# Patient Record
Sex: FEMALE | Race: OTHER | Employment: OTHER | URBAN - NONMETROPOLITAN AREA
[De-identification: names, ages, dates, MRNs, and addresses within clinical notes are randomized per-mention and may not be internally consistent; named-entity substitution may affect disease eponyms.]

---

## 2017-02-06 RX ORDER — AMLODIPINE BESYLATE 10 MG/1
TABLET ORAL
Qty: 90 TABLET | Refills: 0 | Status: SHIPPED | OUTPATIENT
Start: 2017-02-06 | End: 2017-07-03 | Stop reason: SDUPTHER

## 2017-02-06 RX ORDER — ISOSORBIDE MONONITRATE 30 MG/1
30 TABLET, EXTENDED RELEASE ORAL DAILY
Qty: 90 TABLET | Refills: 3 | Status: SHIPPED | OUTPATIENT
Start: 2017-02-06 | End: 2018-05-08 | Stop reason: SDUPTHER

## 2017-02-06 RX ORDER — BUPROPION HYDROCHLORIDE 150 MG/1
TABLET, EXTENDED RELEASE ORAL
Qty: 90 TABLET | Refills: 0 | Status: SHIPPED | OUTPATIENT
Start: 2017-02-06 | End: 2017-06-02 | Stop reason: SDUPTHER

## 2017-02-06 RX ORDER — METFORMIN HYDROCHLORIDE 500 MG/1
TABLET, EXTENDED RELEASE ORAL
Qty: 180 TABLET | Refills: 0 | Status: SHIPPED | OUTPATIENT
Start: 2017-02-06 | End: 2017-06-02 | Stop reason: SDUPTHER

## 2017-02-06 RX ORDER — GLIMEPIRIDE 4 MG/1
TABLET ORAL
Qty: 90 TABLET | Refills: 0 | Status: SHIPPED | OUTPATIENT
Start: 2017-02-06 | End: 2017-03-30 | Stop reason: SDUPTHER

## 2017-02-06 RX ORDER — CITALOPRAM 40 MG/1
TABLET ORAL
Qty: 90 TABLET | Refills: 0 | Status: SHIPPED | OUTPATIENT
Start: 2017-02-06 | End: 2017-06-02 | Stop reason: SDUPTHER

## 2017-03-22 ENCOUNTER — OFFICE VISIT (OUTPATIENT)
Dept: CARDIOLOGY | Age: 57
End: 2017-03-22
Payer: MEDICARE

## 2017-03-22 VITALS
DIASTOLIC BLOOD PRESSURE: 98 MMHG | SYSTOLIC BLOOD PRESSURE: 144 MMHG | HEART RATE: 83 BPM | BODY MASS INDEX: 34.4 KG/M2 | WEIGHT: 175.2 LBS | HEIGHT: 60 IN

## 2017-03-22 DIAGNOSIS — R07.9 CHEST PAIN, UNSPECIFIED: ICD-10-CM

## 2017-03-22 DIAGNOSIS — R00.2 PALPITATIONS: ICD-10-CM

## 2017-03-22 DIAGNOSIS — E78.2 MIXED HYPERLIPIDEMIA: ICD-10-CM

## 2017-03-22 DIAGNOSIS — I10 ESSENTIAL HYPERTENSION: ICD-10-CM

## 2017-03-22 DIAGNOSIS — I25.119 CORONARY ARTERY DISEASE INVOLVING NATIVE CORONARY ARTERY OF NATIVE HEART WITH ANGINA PECTORIS (HCC): Primary | ICD-10-CM

## 2017-03-22 PROCEDURE — 3017F COLORECTAL CA SCREEN DOC REV: CPT | Performed by: INTERNAL MEDICINE

## 2017-03-22 PROCEDURE — G8427 DOCREV CUR MEDS BY ELIG CLIN: HCPCS | Performed by: INTERNAL MEDICINE

## 2017-03-22 PROCEDURE — G8598 ASA/ANTIPLAT THER USED: HCPCS | Performed by: INTERNAL MEDICINE

## 2017-03-22 PROCEDURE — 93000 ELECTROCARDIOGRAM COMPLETE: CPT | Performed by: INTERNAL MEDICINE

## 2017-03-22 PROCEDURE — 3014F SCREEN MAMMO DOC REV: CPT | Performed by: INTERNAL MEDICINE

## 2017-03-22 PROCEDURE — G8417 CALC BMI ABV UP PARAM F/U: HCPCS | Performed by: INTERNAL MEDICINE

## 2017-03-22 PROCEDURE — 99214 OFFICE O/P EST MOD 30 MIN: CPT | Performed by: INTERNAL MEDICINE

## 2017-03-22 PROCEDURE — G8484 FLU IMMUNIZE NO ADMIN: HCPCS | Performed by: INTERNAL MEDICINE

## 2017-03-22 PROCEDURE — 1036F TOBACCO NON-USER: CPT | Performed by: INTERNAL MEDICINE

## 2017-03-30 ENCOUNTER — TELEPHONE (OUTPATIENT)
Dept: SURGERY | Age: 57
End: 2017-03-30

## 2017-03-30 ENCOUNTER — OFFICE VISIT (OUTPATIENT)
Dept: FAMILY MEDICINE CLINIC | Age: 57
End: 2017-03-30
Payer: MEDICARE

## 2017-03-30 VITALS
HEIGHT: 60 IN | HEART RATE: 88 BPM | BODY MASS INDEX: 34.16 KG/M2 | DIASTOLIC BLOOD PRESSURE: 84 MMHG | WEIGHT: 174 LBS | SYSTOLIC BLOOD PRESSURE: 160 MMHG

## 2017-03-30 DIAGNOSIS — R92.8 ABNORMAL MAMMOGRAM OF RIGHT BREAST: ICD-10-CM

## 2017-03-30 DIAGNOSIS — K21.9 GASTROESOPHAGEAL REFLUX DISEASE WITHOUT ESOPHAGITIS: ICD-10-CM

## 2017-03-30 DIAGNOSIS — I25.119 CORONARY ARTERY DISEASE INVOLVING NATIVE CORONARY ARTERY OF NATIVE HEART WITH ANGINA PECTORIS (HCC): ICD-10-CM

## 2017-03-30 DIAGNOSIS — R91.1 LUNG NODULE: ICD-10-CM

## 2017-03-30 DIAGNOSIS — G47.00 INSOMNIA, UNSPECIFIED TYPE: ICD-10-CM

## 2017-03-30 DIAGNOSIS — E83.42 HYPOMAGNESEMIA: ICD-10-CM

## 2017-03-30 DIAGNOSIS — E66.09 NON MORBID OBESITY DUE TO EXCESS CALORIES: ICD-10-CM

## 2017-03-30 DIAGNOSIS — E78.2 MIXED HYPERLIPIDEMIA: ICD-10-CM

## 2017-03-30 DIAGNOSIS — F33.42 RECURRENT MAJOR DEPRESSIVE DISORDER, IN FULL REMISSION (HCC): ICD-10-CM

## 2017-03-30 DIAGNOSIS — I10 ESSENTIAL HYPERTENSION: ICD-10-CM

## 2017-03-30 DIAGNOSIS — Z13.9 SCREENING: ICD-10-CM

## 2017-03-30 DIAGNOSIS — Z11.59 NEED FOR HEPATITIS C SCREENING TEST: Primary | ICD-10-CM

## 2017-03-30 DIAGNOSIS — E11.9 TYPE 2 DIABETES MELLITUS WITHOUT COMPLICATION, WITHOUT LONG-TERM CURRENT USE OF INSULIN (HCC): ICD-10-CM

## 2017-03-30 DIAGNOSIS — G47.33 OBSTRUCTIVE SLEEP APNEA: ICD-10-CM

## 2017-03-30 DIAGNOSIS — F41.9 ANXIETY: ICD-10-CM

## 2017-03-30 PROCEDURE — G8484 FLU IMMUNIZE NO ADMIN: HCPCS | Performed by: FAMILY MEDICINE

## 2017-03-30 PROCEDURE — G8417 CALC BMI ABV UP PARAM F/U: HCPCS | Performed by: FAMILY MEDICINE

## 2017-03-30 PROCEDURE — 93000 ELECTROCARDIOGRAM COMPLETE: CPT | Performed by: FAMILY MEDICINE

## 2017-03-30 PROCEDURE — 3046F HEMOGLOBIN A1C LEVEL >9.0%: CPT | Performed by: FAMILY MEDICINE

## 2017-03-30 PROCEDURE — 3017F COLORECTAL CA SCREEN DOC REV: CPT | Performed by: FAMILY MEDICINE

## 2017-03-30 PROCEDURE — G8427 DOCREV CUR MEDS BY ELIG CLIN: HCPCS | Performed by: FAMILY MEDICINE

## 2017-03-30 PROCEDURE — 3014F SCREEN MAMMO DOC REV: CPT | Performed by: FAMILY MEDICINE

## 2017-03-30 PROCEDURE — G8598 ASA/ANTIPLAT THER USED: HCPCS | Performed by: FAMILY MEDICINE

## 2017-03-30 PROCEDURE — 99214 OFFICE O/P EST MOD 30 MIN: CPT | Performed by: FAMILY MEDICINE

## 2017-03-30 PROCEDURE — 1036F TOBACCO NON-USER: CPT | Performed by: FAMILY MEDICINE

## 2017-03-30 RX ORDER — LORAZEPAM 0.5 MG/1
TABLET ORAL
Qty: 30 TABLET | Refills: 0 | Status: SHIPPED | OUTPATIENT
Start: 2017-03-30 | End: 2017-05-19 | Stop reason: SDUPTHER

## 2017-03-30 RX ORDER — METOPROLOL TARTRATE 100 MG/1
100 TABLET ORAL 2 TIMES DAILY
Qty: 180 TABLET | Refills: 3 | Status: SHIPPED | OUTPATIENT
Start: 2017-03-30 | End: 2018-05-08 | Stop reason: SDUPTHER

## 2017-03-30 RX ORDER — CLOPIDOGREL BISULFATE 75 MG/1
TABLET ORAL
Qty: 90 TABLET | Refills: 0 | Status: SHIPPED | OUTPATIENT
Start: 2017-03-30 | End: 2017-07-03 | Stop reason: SDUPTHER

## 2017-03-30 RX ORDER — GLIMEPIRIDE 4 MG/1
4 TABLET ORAL 2 TIMES DAILY WITH MEALS
Qty: 180 TABLET | Refills: 3 | Status: ON HOLD
Start: 2017-03-30 | End: 2017-10-10 | Stop reason: HOSPADM

## 2017-03-30 RX ORDER — ATORVASTATIN CALCIUM 40 MG/1
TABLET, FILM COATED ORAL
Qty: 90 TABLET | Refills: 0 | Status: SHIPPED | OUTPATIENT
Start: 2017-03-30 | End: 2017-07-03 | Stop reason: SDUPTHER

## 2017-03-30 ASSESSMENT — ENCOUNTER SYMPTOMS
WHEEZING: 0
GASTROINTESTINAL NEGATIVE: 1
EYES NEGATIVE: 1
ALLERGIC/IMMUNOLOGIC NEGATIVE: 1
SHORTNESS OF BREATH: 1

## 2017-04-01 ENCOUNTER — APPOINTMENT (OUTPATIENT)
Dept: CT IMAGING | Age: 57
End: 2017-04-01
Payer: MEDICARE

## 2017-04-01 ENCOUNTER — HOSPITAL ENCOUNTER (EMERGENCY)
Age: 57
Discharge: HOME | End: 2017-04-01
Attending: EMERGENCY MEDICINE
Payer: MEDICARE

## 2017-04-01 VITALS
OXYGEN SATURATION: 98 % | TEMPERATURE: 98.2 F | DIASTOLIC BLOOD PRESSURE: 69 MMHG | HEART RATE: 96 BPM | RESPIRATION RATE: 14 BRPM | SYSTOLIC BLOOD PRESSURE: 159 MMHG

## 2017-04-01 DIAGNOSIS — R51.9 NONINTRACTABLE HEADACHE, UNSPECIFIED CHRONICITY PATTERN, UNSPECIFIED HEADACHE TYPE: Primary | ICD-10-CM

## 2017-04-01 LAB
-: ABNORMAL
ABSOLUTE EOS #: 0.1 K/UL (ref 0–0.4)
ABSOLUTE LYMPH #: 1.7 K/UL (ref 1–4.8)
ABSOLUTE MONO #: 0.5 K/UL (ref 0.1–1.2)
AMORPHOUS: ABNORMAL
ANION GAP SERPL CALCULATED.3IONS-SCNC: 11 MMOL/L (ref 9–17)
BACTERIA: ABNORMAL
BASOPHILS # BLD: 1 % (ref 0–2)
BASOPHILS ABSOLUTE: 0 K/UL (ref 0–0.2)
BILIRUBIN URINE: NEGATIVE
BUN BLDV-MCNC: 9 MG/DL (ref 6–20)
BUN/CREAT BLD: 13 (ref 9–20)
CALCIUM SERPL-MCNC: 8.7 MG/DL (ref 8.6–10.4)
CASTS UA: ABNORMAL /LPF (ref 0–2)
CHLORIDE BLD-SCNC: 98 MMOL/L (ref 98–107)
CO2: 30 MMOL/L (ref 20–31)
COLOR: ABNORMAL
COMMENT UA: ABNORMAL
CREAT SERPL-MCNC: 0.71 MG/DL (ref 0.5–0.9)
CRYSTALS, UA: ABNORMAL /HPF
DIFFERENTIAL TYPE: ABNORMAL
EOSINOPHILS RELATIVE PERCENT: 2 % (ref 1–4)
EPITHELIAL CELLS UA: ABNORMAL /HPF (ref 0–5)
GFR AFRICAN AMERICAN: >60 ML/MIN
GFR NON-AFRICAN AMERICAN: >60 ML/MIN
GFR SERPL CREATININE-BSD FRML MDRD: ABNORMAL ML/MIN/{1.73_M2}
GFR SERPL CREATININE-BSD FRML MDRD: ABNORMAL ML/MIN/{1.73_M2}
GLUCOSE BLD-MCNC: 373 MG/DL (ref 70–99)
GLUCOSE URINE: ABNORMAL
HCT VFR BLD CALC: 36.2 % (ref 36–46)
HEMOGLOBIN: 12 G/DL (ref 12–16)
KETONES, URINE: NEGATIVE
LEUKOCYTE ESTERASE, URINE: NEGATIVE
LYMPHOCYTES # BLD: 24 % (ref 24–44)
MCH RBC QN AUTO: 30.6 PG (ref 26–34)
MCHC RBC AUTO-ENTMCNC: 33.1 G/DL (ref 31–37)
MCV RBC AUTO: 92.2 FL (ref 80–100)
MONOCYTES # BLD: 7 % (ref 1–7)
MUCUS: ABNORMAL
NITRITE, URINE: NEGATIVE
OTHER OBSERVATIONS UA: ABNORMAL
PDW BLD-RTO: 13.7 % (ref 11–14.5)
PH UA: 5.5 (ref 5–6)
PLATELET # BLD: 225 K/UL (ref 140–450)
PLATELET ESTIMATE: ABNORMAL
PMV BLD AUTO: 9.8 FL (ref 6–12)
POTASSIUM SERPL-SCNC: 3.5 MMOL/L (ref 3.7–5.3)
PROTEIN UA: NEGATIVE
RBC # BLD: 3.92 M/UL (ref 4–5.2)
RBC # BLD: ABNORMAL 10*6/UL
RBC UA: ABNORMAL /HPF (ref 0–4)
RENAL EPITHELIAL, UA: ABNORMAL /HPF
SEG NEUTROPHILS: 66 % (ref 36–66)
SEGMENTED NEUTROPHILS ABSOLUTE COUNT: 4.8 K/UL (ref 1.8–7.7)
SODIUM BLD-SCNC: 139 MMOL/L (ref 135–144)
SPECIFIC GRAVITY UA: 1.01 (ref 1.01–1.02)
TRICHOMONAS: ABNORMAL
TROPONIN INTERP: NORMAL
TROPONIN T: <0.03 NG/ML
TURBIDITY: ABNORMAL
URINE HGB: NEGATIVE
UROBILINOGEN, URINE: NORMAL
WBC # BLD: 7.2 K/UL (ref 3.5–11)
WBC # BLD: ABNORMAL 10*3/UL
WBC UA: ABNORMAL /HPF (ref 0–4)
YEAST: ABNORMAL

## 2017-04-01 PROCEDURE — 6360000002 HC RX W HCPCS: Performed by: EMERGENCY MEDICINE

## 2017-04-01 PROCEDURE — 93005 ELECTROCARDIOGRAM TRACING: CPT

## 2017-04-01 PROCEDURE — 80048 BASIC METABOLIC PNL TOTAL CA: CPT

## 2017-04-01 PROCEDURE — 99284 EMERGENCY DEPT VISIT MOD MDM: CPT

## 2017-04-01 PROCEDURE — 84484 ASSAY OF TROPONIN QUANT: CPT

## 2017-04-01 PROCEDURE — 70450 CT HEAD/BRAIN W/O DYE: CPT | Performed by: RADIOLOGY

## 2017-04-01 PROCEDURE — 96372 THER/PROPH/DIAG INJ SC/IM: CPT

## 2017-04-01 PROCEDURE — 6370000000 HC RX 637 (ALT 250 FOR IP): Performed by: EMERGENCY MEDICINE

## 2017-04-01 PROCEDURE — 70450 CT HEAD/BRAIN W/O DYE: CPT

## 2017-04-01 PROCEDURE — 36415 COLL VENOUS BLD VENIPUNCTURE: CPT

## 2017-04-01 PROCEDURE — 81001 URINALYSIS AUTO W/SCOPE: CPT

## 2017-04-01 PROCEDURE — 85025 COMPLETE CBC W/AUTO DIFF WBC: CPT

## 2017-04-01 RX ORDER — METOPROLOL TARTRATE 50 MG/1
100 TABLET, FILM COATED ORAL ONCE
Status: COMPLETED | OUTPATIENT
Start: 2017-04-01 | End: 2017-04-01

## 2017-04-01 RX ORDER — KETOROLAC TROMETHAMINE 30 MG/ML
30 INJECTION, SOLUTION INTRAMUSCULAR; INTRAVENOUS ONCE
Status: COMPLETED | OUTPATIENT
Start: 2017-04-01 | End: 2017-04-01

## 2017-04-01 RX ADMIN — METOPROLOL TARTRATE 100 MG: 50 TABLET, FILM COATED ORAL at 04:55

## 2017-04-01 RX ADMIN — KETOROLAC TROMETHAMINE 30 MG: 30 INJECTION, SOLUTION INTRAMUSCULAR at 03:35

## 2017-04-01 ASSESSMENT — PAIN SCALES - GENERAL
PAINLEVEL_OUTOF10: 10
PAINLEVEL_OUTOF10: 8
PAINLEVEL_OUTOF10: 3
PAINLEVEL_OUTOF10: 4

## 2017-04-01 ASSESSMENT — PAIN DESCRIPTION - FREQUENCY: FREQUENCY: CONTINUOUS

## 2017-04-01 ASSESSMENT — PAIN - FUNCTIONAL ASSESSMENT: PAIN_FUNCTIONAL_ASSESSMENT: 0-10

## 2017-04-01 ASSESSMENT — PAIN DESCRIPTION - DESCRIPTORS: DESCRIPTORS: THROBBING

## 2017-04-01 ASSESSMENT — PAIN DESCRIPTION - ONSET: ONSET: ON-GOING

## 2017-04-01 ASSESSMENT — PAIN DESCRIPTION - PAIN TYPE: TYPE: ACUTE PAIN

## 2017-04-01 ASSESSMENT — ENCOUNTER SYMPTOMS
NAUSEA: 0
SHORTNESS OF BREATH: 0
VOMITING: 0

## 2017-04-01 ASSESSMENT — PAIN DESCRIPTION - PROGRESSION: CLINICAL_PROGRESSION: NOT CHANGED

## 2017-04-01 ASSESSMENT — PAIN DESCRIPTION - LOCATION: LOCATION: HEAD

## 2017-04-02 LAB
EKG ATRIAL RATE: 92 BPM
EKG P AXIS: 46 DEGREES
EKG P-R INTERVAL: 142 MS
EKG Q-T INTERVAL: 458 MS
EKG QRS DURATION: 80 MS
EKG QTC CALCULATION (BAZETT): 566 MS
EKG T AXIS: 22 DEGREES
EKG VENTRICULAR RATE: 92 BPM

## 2017-04-03 DIAGNOSIS — I25.119 CORONARY ARTERY DISEASE INVOLVING NATIVE CORONARY ARTERY OF NATIVE HEART WITH ANGINA PECTORIS (HCC): ICD-10-CM

## 2017-04-03 RX ORDER — ATORVASTATIN CALCIUM 40 MG/1
TABLET, FILM COATED ORAL
Qty: 90 TABLET | Refills: 0 | OUTPATIENT
Start: 2017-04-03

## 2017-04-03 RX ORDER — CLOPIDOGREL BISULFATE 75 MG/1
TABLET ORAL
Qty: 90 TABLET | Refills: 0 | OUTPATIENT
Start: 2017-04-03

## 2017-04-04 ENCOUNTER — TELEPHONE (OUTPATIENT)
Dept: FAMILY MEDICINE CLINIC | Age: 57
End: 2017-04-04

## 2017-05-03 RX ORDER — TRAZODONE HYDROCHLORIDE 100 MG/1
TABLET ORAL
Qty: 90 TABLET | Refills: 0 | Status: SHIPPED | OUTPATIENT
Start: 2017-05-03 | End: 2017-08-03 | Stop reason: SDUPTHER

## 2017-05-19 RX ORDER — LORAZEPAM 0.5 MG/1
TABLET ORAL
Qty: 30 TABLET | Refills: 0 | Status: SHIPPED | OUTPATIENT
Start: 2017-05-19 | End: 2020-10-15 | Stop reason: SDUPTHER

## 2017-06-05 RX ORDER — CITALOPRAM 40 MG/1
TABLET ORAL
Qty: 90 TABLET | Refills: 0 | Status: SHIPPED | OUTPATIENT
Start: 2017-06-05 | End: 2017-10-03 | Stop reason: SDUPTHER

## 2017-06-05 RX ORDER — METFORMIN HYDROCHLORIDE 500 MG/1
TABLET, EXTENDED RELEASE ORAL
Qty: 180 TABLET | Refills: 0 | Status: SHIPPED | OUTPATIENT
Start: 2017-06-05 | End: 2017-10-03 | Stop reason: SDUPTHER

## 2017-06-05 RX ORDER — BUPROPION HYDROCHLORIDE 150 MG/1
TABLET, EXTENDED RELEASE ORAL
Qty: 90 TABLET | Refills: 0 | Status: SHIPPED | OUTPATIENT
Start: 2017-06-05 | End: 2017-10-03 | Stop reason: SDUPTHER

## 2017-07-03 ENCOUNTER — OFFICE VISIT (OUTPATIENT)
Dept: FAMILY MEDICINE CLINIC | Age: 57
End: 2017-07-03
Payer: MEDICARE

## 2017-07-03 ENCOUNTER — NURSE ONLY (OUTPATIENT)
Dept: LAB | Age: 57
End: 2017-07-03
Payer: MEDICARE

## 2017-07-03 VITALS
HEART RATE: 80 BPM | HEIGHT: 60 IN | DIASTOLIC BLOOD PRESSURE: 84 MMHG | BODY MASS INDEX: 34.15 KG/M2 | WEIGHT: 173.94 LBS | SYSTOLIC BLOOD PRESSURE: 138 MMHG

## 2017-07-03 DIAGNOSIS — Z23 NEED FOR TDAP VACCINATION: ICD-10-CM

## 2017-07-03 DIAGNOSIS — Z12.11 ENCOUNTER FOR SCREENING COLONOSCOPY: ICD-10-CM

## 2017-07-03 DIAGNOSIS — I25.119 CORONARY ARTERY DISEASE INVOLVING NATIVE CORONARY ARTERY OF NATIVE HEART WITH ANGINA PECTORIS (HCC): ICD-10-CM

## 2017-07-03 DIAGNOSIS — E11.9 TYPE 2 DIABETES MELLITUS WITHOUT COMPLICATION, WITHOUT LONG-TERM CURRENT USE OF INSULIN (HCC): Primary | ICD-10-CM

## 2017-07-03 DIAGNOSIS — Z23 NEED FOR TD VACCINE: Primary | ICD-10-CM

## 2017-07-03 PROCEDURE — G8598 ASA/ANTIPLAT THER USED: HCPCS | Performed by: FAMILY MEDICINE

## 2017-07-03 PROCEDURE — 3014F SCREEN MAMMO DOC REV: CPT | Performed by: FAMILY MEDICINE

## 2017-07-03 PROCEDURE — 99213 OFFICE O/P EST LOW 20 MIN: CPT | Performed by: FAMILY MEDICINE

## 2017-07-03 PROCEDURE — 3046F HEMOGLOBIN A1C LEVEL >9.0%: CPT | Performed by: FAMILY MEDICINE

## 2017-07-03 PROCEDURE — 3017F COLORECTAL CA SCREEN DOC REV: CPT | Performed by: FAMILY MEDICINE

## 2017-07-03 PROCEDURE — 1036F TOBACCO NON-USER: CPT | Performed by: FAMILY MEDICINE

## 2017-07-03 PROCEDURE — G8427 DOCREV CUR MEDS BY ELIG CLIN: HCPCS | Performed by: FAMILY MEDICINE

## 2017-07-03 PROCEDURE — 90471 IMMUNIZATION ADMIN: CPT | Performed by: FAMILY MEDICINE

## 2017-07-03 PROCEDURE — G8417 CALC BMI ABV UP PARAM F/U: HCPCS | Performed by: FAMILY MEDICINE

## 2017-07-03 RX ORDER — ATORVASTATIN CALCIUM 40 MG/1
TABLET, FILM COATED ORAL
Qty: 90 TABLET | Refills: 0 | Status: SHIPPED | OUTPATIENT
Start: 2017-07-03 | End: 2017-11-07 | Stop reason: SDUPTHER

## 2017-07-03 RX ORDER — CLOPIDOGREL BISULFATE 75 MG/1
TABLET ORAL
Qty: 90 TABLET | Refills: 0 | Status: SHIPPED | OUTPATIENT
Start: 2017-07-03 | End: 2017-11-07 | Stop reason: SDUPTHER

## 2017-07-03 RX ORDER — AMLODIPINE BESYLATE 10 MG/1
TABLET ORAL
Qty: 90 TABLET | Refills: 3 | Status: SHIPPED | OUTPATIENT
Start: 2017-07-03 | End: 2018-05-08 | Stop reason: SDUPTHER

## 2017-07-03 RX ORDER — NITROGLYCERIN 0.4 MG/1
0.4 TABLET SUBLINGUAL EVERY 5 MIN PRN
Qty: 25 TABLET | Refills: 3 | Status: SHIPPED | OUTPATIENT
Start: 2017-07-03 | End: 2018-06-06 | Stop reason: SDUPTHER

## 2017-07-03 RX ORDER — LOSARTAN POTASSIUM 100 MG/1
TABLET ORAL
Qty: 90 TABLET | Refills: 3 | Status: SHIPPED | OUTPATIENT
Start: 2017-07-03 | End: 2018-05-08 | Stop reason: SDUPTHER

## 2017-07-03 ASSESSMENT — ENCOUNTER SYMPTOMS
GASTROINTESTINAL NEGATIVE: 1
SHORTNESS OF BREATH: 0
WHEEZING: 0
EYES NEGATIVE: 1
ALLERGIC/IMMUNOLOGIC NEGATIVE: 1

## 2017-07-03 ASSESSMENT — PATIENT HEALTH QUESTIONNAIRE - PHQ9
1. LITTLE INTEREST OR PLEASURE IN DOING THINGS: 0
2. FEELING DOWN, DEPRESSED OR HOPELESS: 0
SUM OF ALL RESPONSES TO PHQ QUESTIONS 1-9: 0
SUM OF ALL RESPONSES TO PHQ9 QUESTIONS 1 & 2: 0

## 2017-07-17 ENCOUNTER — OFFICE VISIT (OUTPATIENT)
Dept: OPTOMETRY | Age: 57
End: 2017-07-17
Payer: MEDICARE

## 2017-07-17 DIAGNOSIS — H26.9 CATARACT: ICD-10-CM

## 2017-07-17 DIAGNOSIS — H53.8 BLURRED VISION, BILATERAL: ICD-10-CM

## 2017-07-17 DIAGNOSIS — H52.4 MYOPIA OF BOTH EYES WITH ASTIGMATISM AND PRESBYOPIA: ICD-10-CM

## 2017-07-17 DIAGNOSIS — E11.3299 BACKGROUND DIABETIC RETINOPATHY (HCC): Primary | ICD-10-CM

## 2017-07-17 DIAGNOSIS — H52.203 MYOPIA OF BOTH EYES WITH ASTIGMATISM AND PRESBYOPIA: ICD-10-CM

## 2017-07-17 DIAGNOSIS — E11.9 NON-INSULIN DEPENDENT TYPE 2 DIABETES MELLITUS (HCC): ICD-10-CM

## 2017-07-17 DIAGNOSIS — H52.13 MYOPIA OF BOTH EYES WITH ASTIGMATISM AND PRESBYOPIA: ICD-10-CM

## 2017-07-17 PROCEDURE — 3017F COLORECTAL CA SCREEN DOC REV: CPT | Performed by: OPTOMETRIST

## 2017-07-17 PROCEDURE — 1036F TOBACCO NON-USER: CPT | Performed by: OPTOMETRIST

## 2017-07-17 PROCEDURE — 99203 OFFICE O/P NEW LOW 30 MIN: CPT | Performed by: OPTOMETRIST

## 2017-07-17 PROCEDURE — G8417 CALC BMI ABV UP PARAM F/U: HCPCS | Performed by: OPTOMETRIST

## 2017-07-17 PROCEDURE — G8427 DOCREV CUR MEDS BY ELIG CLIN: HCPCS | Performed by: OPTOMETRIST

## 2017-07-17 PROCEDURE — G8598 ASA/ANTIPLAT THER USED: HCPCS | Performed by: OPTOMETRIST

## 2017-07-17 PROCEDURE — 3046F HEMOGLOBIN A1C LEVEL >9.0%: CPT | Performed by: OPTOMETRIST

## 2017-07-17 PROCEDURE — 3014F SCREEN MAMMO DOC REV: CPT | Performed by: OPTOMETRIST

## 2017-07-17 RX ORDER — BENOXINATE HCL/FLUORESCEIN SOD 0.4%-0.25%
1 DROPS OPHTHALMIC (EYE) ONCE
Status: COMPLETED | OUTPATIENT
Start: 2017-07-17 | End: 2017-07-17

## 2017-07-17 RX ORDER — PHENYLEPHRINE HCL 2.5 %
1 DROPS OPHTHALMIC (EYE) ONCE
Status: COMPLETED | OUTPATIENT
Start: 2017-07-17 | End: 2017-07-17

## 2017-07-17 RX ORDER — TROPICAMIDE 10 MG/ML
1 SOLUTION/ DROPS OPHTHALMIC ONCE
Status: COMPLETED | OUTPATIENT
Start: 2017-07-17 | End: 2017-07-17

## 2017-07-17 RX ADMIN — Medication 1 DROP: at 14:13

## 2017-07-17 RX ADMIN — TROPICAMIDE 1 DROP: 10 SOLUTION/ DROPS OPHTHALMIC at 14:13

## 2017-07-17 ASSESSMENT — TONOMETRY
OD_IOP_MMHG: 19
IOP_METHOD: PALPATION
OS_IOP_MMHG: 18

## 2017-07-17 ASSESSMENT — REFRACTION_WEARINGRX
OD_VPRISM: 1/2 BU
OD_AXIS: 068
OS_CYLINDER: -0.50
OD_SPHERE: -8.00
OS_AXIS: 101
OD_CYLINDER: -0.50
SPECS_TYPE: PAL
OD_ADD: +1.50
OS_SPHERE: -6.50
OS_ADD: +1.50
OS_VPRISM: 1/2 BD

## 2017-07-17 ASSESSMENT — VISUAL ACUITY
CORRECTION_TYPE: GLASSES
OS_CC: 20/20
OD_CC+: +2
OD_CC: 20/50 OU
METHOD: SNELLEN - LINEAR

## 2017-07-17 ASSESSMENT — REFRACTION_MANIFEST
OS_CYLINDER: -0.50
OS_SPHERE: -6.00
OD_SPHERE: -7.00
OS_ADD: +2.00
OD_ADD: +2.00
OD_VPRISM: 1BU
OS_AXIS: 110
OD_AXIS: 066
OD_CYLINDER: -+.75

## 2017-07-17 ASSESSMENT — SLIT LAMP EXAM - LIDS
COMMENTS: NORMAL
COMMENTS: NORMAL

## 2017-07-17 ASSESSMENT — REFRACTION_FINALRX
OD_VPRISM: 2BU
OS_VPRISM: 2 BD

## 2017-08-03 RX ORDER — TRAZODONE HYDROCHLORIDE 100 MG/1
TABLET ORAL
Qty: 90 TABLET | Refills: 0 | Status: SHIPPED | OUTPATIENT
Start: 2017-08-03 | End: 2017-11-07 | Stop reason: SDUPTHER

## 2017-09-08 PROBLEM — E11.3299 BACKGROUND DIABETIC RETINOPATHY (HCC): Status: ACTIVE | Noted: 2017-09-08

## 2017-09-27 ENCOUNTER — OFFICE VISIT (OUTPATIENT)
Dept: CARDIOLOGY | Age: 57
End: 2017-09-27
Payer: MEDICARE

## 2017-09-27 ENCOUNTER — NURSE ONLY (OUTPATIENT)
Dept: LAB | Age: 57
End: 2017-09-27
Payer: MEDICARE

## 2017-09-27 VITALS
SYSTOLIC BLOOD PRESSURE: 120 MMHG | WEIGHT: 172 LBS | HEART RATE: 63 BPM | HEIGHT: 60 IN | DIASTOLIC BLOOD PRESSURE: 70 MMHG | BODY MASS INDEX: 33.77 KG/M2

## 2017-09-27 DIAGNOSIS — I10 ESSENTIAL HYPERTENSION: ICD-10-CM

## 2017-09-27 DIAGNOSIS — E78.2 MIXED HYPERLIPIDEMIA: ICD-10-CM

## 2017-09-27 DIAGNOSIS — I25.119 CORONARY ARTERY DISEASE INVOLVING NATIVE CORONARY ARTERY OF NATIVE HEART WITH ANGINA PECTORIS (HCC): Primary | ICD-10-CM

## 2017-09-27 DIAGNOSIS — R07.9 CHEST PAIN, UNSPECIFIED: ICD-10-CM

## 2017-09-27 DIAGNOSIS — Z23 NEED FOR VACCINATION: Primary | ICD-10-CM

## 2017-09-27 PROCEDURE — 99999 PR OFFICE/OUTPT VISIT,PROCEDURE ONLY: CPT | Performed by: FAMILY MEDICINE

## 2017-09-27 PROCEDURE — 90686 IIV4 VACC NO PRSV 0.5 ML IM: CPT | Performed by: FAMILY MEDICINE

## 2017-09-27 PROCEDURE — G8598 ASA/ANTIPLAT THER USED: HCPCS | Performed by: INTERNAL MEDICINE

## 2017-09-27 PROCEDURE — 1036F TOBACCO NON-USER: CPT | Performed by: INTERNAL MEDICINE

## 2017-09-27 PROCEDURE — G0008 ADMIN INFLUENZA VIRUS VAC: HCPCS | Performed by: FAMILY MEDICINE

## 2017-09-27 PROCEDURE — 93000 ELECTROCARDIOGRAM COMPLETE: CPT | Performed by: INTERNAL MEDICINE

## 2017-09-27 PROCEDURE — 99214 OFFICE O/P EST MOD 30 MIN: CPT | Performed by: INTERNAL MEDICINE

## 2017-09-27 PROCEDURE — 3014F SCREEN MAMMO DOC REV: CPT | Performed by: INTERNAL MEDICINE

## 2017-09-27 PROCEDURE — G8427 DOCREV CUR MEDS BY ELIG CLIN: HCPCS | Performed by: INTERNAL MEDICINE

## 2017-09-27 PROCEDURE — G8417 CALC BMI ABV UP PARAM F/U: HCPCS | Performed by: INTERNAL MEDICINE

## 2017-09-27 PROCEDURE — 3017F COLORECTAL CA SCREEN DOC REV: CPT | Performed by: INTERNAL MEDICINE

## 2017-09-27 RX ORDER — RANOLAZINE 500 MG/1
500 TABLET, EXTENDED RELEASE ORAL 2 TIMES DAILY
Qty: 60 TABLET | Refills: 3 | Status: SHIPPED | OUTPATIENT
Start: 2017-09-27 | End: 2018-05-08 | Stop reason: SDUPTHER

## 2017-10-03 ENCOUNTER — CARE COORDINATION (OUTPATIENT)
Dept: CARE COORDINATION | Age: 57
End: 2017-10-03

## 2017-10-03 NOTE — LETTER
10/3/2017    [address] [facility] [address] [facility] have been selected by your primary care provider to participate in a Care Coordination Program that provides additional support and resources to provide a higher level of care to our patients. One of those resources is a Nurse Care Coordinator, Kortney Balbuena who can offer support in managing the health of our patients who have chronic health conditions, multiple health conditions, and or complex health needs. Examples of the types of support Kortney Balbuena RN will provide include service coordination (finding providers in your network and community, assistance in scheduling services, ensuring test results are available, etc), education, and promoting your ability to follow your doctor's recommended treatment plan. You have been selected to take part in this unique program that will include one on one interaction with Kortney Balbuena RN. Kortney Balbuena RN will work with you following some of your appointments with me in our office. She/He will also contact you via phone to help you learn and understand how to manage your health and work towards your chosen health goals. I hope that you will be as excited by this program as we are. Kortney Balbuena RN will be contacting you in the next week to speak with you regarding your plan of care.     Sincerely,     Wendy Rodriguez MD

## 2017-10-04 RX ORDER — BUPROPION HYDROCHLORIDE 150 MG/1
TABLET, EXTENDED RELEASE ORAL
Qty: 90 TABLET | Refills: 0 | Status: SHIPPED | OUTPATIENT
Start: 2017-10-04 | End: 2018-01-03 | Stop reason: SDUPTHER

## 2017-10-04 RX ORDER — GLIMEPIRIDE 4 MG/1
TABLET ORAL
Qty: 90 TABLET | Refills: 0 | Status: SHIPPED | OUTPATIENT
Start: 2017-10-04 | End: 2018-06-06 | Stop reason: SDUPTHER

## 2017-10-04 RX ORDER — CITALOPRAM 40 MG/1
TABLET ORAL
Qty: 90 TABLET | Refills: 0 | Status: SHIPPED | OUTPATIENT
Start: 2017-10-04 | End: 2018-02-03 | Stop reason: SDUPTHER

## 2017-10-04 RX ORDER — LORAZEPAM 0.5 MG/1
TABLET ORAL
Qty: 30 TABLET | Refills: 0 | Status: ON HOLD | OUTPATIENT
Start: 2017-10-04 | End: 2017-10-10 | Stop reason: HOSPADM

## 2017-10-04 RX ORDER — METFORMIN HYDROCHLORIDE 500 MG/1
TABLET, EXTENDED RELEASE ORAL
Qty: 180 TABLET | Refills: 0 | Status: SHIPPED | OUTPATIENT
Start: 2017-10-04 | End: 2018-05-04 | Stop reason: SDUPTHER

## 2017-10-09 ENCOUNTER — APPOINTMENT (OUTPATIENT)
Dept: GENERAL RADIOLOGY | Age: 57
End: 2017-10-09
Payer: MEDICARE

## 2017-10-09 ENCOUNTER — HOSPITAL ENCOUNTER (OUTPATIENT)
Age: 57
Setting detail: OBSERVATION
Discharge: HOME | End: 2017-10-10
Attending: EMERGENCY MEDICINE | Admitting: FAMILY MEDICINE
Payer: MEDICARE

## 2017-10-09 DIAGNOSIS — R07.9 CHEST PAIN, UNSPECIFIED TYPE: Primary | ICD-10-CM

## 2017-10-09 LAB
ABSOLUTE EOS #: 0.1 K/UL (ref 0–0.4)
ABSOLUTE LYMPH #: 2.7 K/UL (ref 1–4.8)
ABSOLUTE MONO #: 0.5 K/UL (ref 0.1–1.2)
ANION GAP SERPL CALCULATED.3IONS-SCNC: 13 MMOL/L (ref 9–17)
BASOPHILS # BLD: 1 % (ref 0–1)
BASOPHILS ABSOLUTE: 0.1 K/UL (ref 0–0.2)
BUN BLDV-MCNC: 12 MG/DL (ref 6–20)
BUN/CREAT BLD: 10 (ref 9–20)
CALCIUM SERPL-MCNC: 9.3 MG/DL (ref 8.6–10.4)
CHLORIDE BLD-SCNC: 100 MMOL/L (ref 98–107)
CO2: 30 MMOL/L (ref 20–31)
CREAT SERPL-MCNC: 1.18 MG/DL (ref 0.5–0.9)
DIFFERENTIAL TYPE: ABNORMAL
EOSINOPHILS RELATIVE PERCENT: 1 % (ref 1–7)
GFR AFRICAN AMERICAN: 57 ML/MIN
GFR NON-AFRICAN AMERICAN: 47 ML/MIN
GFR SERPL CREATININE-BSD FRML MDRD: ABNORMAL ML/MIN/{1.73_M2}
GFR SERPL CREATININE-BSD FRML MDRD: ABNORMAL ML/MIN/{1.73_M2}
GLUCOSE BLD-MCNC: 92 MG/DL (ref 70–99)
HCT VFR BLD CALC: 36.2 % (ref 36–46)
HEMOGLOBIN: 12 G/DL (ref 12–16)
LYMPHOCYTES # BLD: 29 % (ref 16–46)
MCH RBC QN AUTO: 30.9 PG (ref 26–34)
MCHC RBC AUTO-ENTMCNC: 33.2 G/DL (ref 31–37)
MCV RBC AUTO: 93 FL (ref 80–100)
MONOCYTES # BLD: 6 % (ref 4–11)
PDW BLD-RTO: 13.1 % (ref 11–14.5)
PLATELET # BLD: 300 K/UL (ref 140–450)
PLATELET ESTIMATE: ABNORMAL
PMV BLD AUTO: 8.8 FL (ref 6–12)
POTASSIUM SERPL-SCNC: 4 MMOL/L (ref 3.7–5.3)
RBC # BLD: 3.89 M/UL (ref 4–5.2)
RBC # BLD: ABNORMAL 10*6/UL
SEG NEUTROPHILS: 63 % (ref 43–77)
SEGMENTED NEUTROPHILS ABSOLUTE COUNT: 5.9 K/UL (ref 1.8–7.7)
SODIUM BLD-SCNC: 143 MMOL/L (ref 135–144)
TROPONIN INTERP: NORMAL
TROPONIN T: <0.03 NG/ML
WBC # BLD: 9.2 K/UL (ref 3.5–11)
WBC # BLD: ABNORMAL 10*3/UL

## 2017-10-09 PROCEDURE — 84484 ASSAY OF TROPONIN QUANT: CPT

## 2017-10-09 PROCEDURE — 96374 THER/PROPH/DIAG INJ IV PUSH: CPT

## 2017-10-09 PROCEDURE — 6360000002 HC RX W HCPCS: Performed by: EMERGENCY MEDICINE

## 2017-10-09 PROCEDURE — 36415 COLL VENOUS BLD VENIPUNCTURE: CPT

## 2017-10-09 PROCEDURE — 99285 EMERGENCY DEPT VISIT HI MDM: CPT

## 2017-10-09 PROCEDURE — 80048 BASIC METABOLIC PNL TOTAL CA: CPT

## 2017-10-09 PROCEDURE — 93005 ELECTROCARDIOGRAM TRACING: CPT

## 2017-10-09 PROCEDURE — 71010 XR CHEST PORTABLE: CPT

## 2017-10-09 PROCEDURE — 6370000000 HC RX 637 (ALT 250 FOR IP): Performed by: EMERGENCY MEDICINE

## 2017-10-09 PROCEDURE — 85025 COMPLETE CBC W/AUTO DIFF WBC: CPT

## 2017-10-09 RX ORDER — NITROGLYCERIN 0.4 MG/1
0.4 TABLET SUBLINGUAL EVERY 5 MIN PRN
Status: DISCONTINUED | OUTPATIENT
Start: 2017-10-09 | End: 2017-10-10 | Stop reason: HOSPADM

## 2017-10-09 RX ORDER — ASPIRIN 81 MG/1
324 TABLET, CHEWABLE ORAL ONCE
Status: COMPLETED | OUTPATIENT
Start: 2017-10-09 | End: 2017-10-09

## 2017-10-09 RX ORDER — FENTANYL CITRATE 50 UG/ML
25 INJECTION, SOLUTION INTRAMUSCULAR; INTRAVENOUS ONCE
Status: COMPLETED | OUTPATIENT
Start: 2017-10-10 | End: 2017-10-09

## 2017-10-09 RX ORDER — ONDANSETRON 4 MG/1
4 TABLET, ORALLY DISINTEGRATING ORAL ONCE
Status: COMPLETED | OUTPATIENT
Start: 2017-10-09 | End: 2017-10-09

## 2017-10-09 RX ADMIN — ASPIRIN 81 MG 324 MG: 81 TABLET ORAL at 22:54

## 2017-10-09 RX ADMIN — NITROGLYCERIN 0.4 MG: 0.4 TABLET SUBLINGUAL at 23:03

## 2017-10-09 RX ADMIN — ONDANSETRON 4 MG: 4 TABLET, ORALLY DISINTEGRATING ORAL at 23:24

## 2017-10-09 RX ADMIN — NITROGLYCERIN 0.4 MG: 0.4 TABLET SUBLINGUAL at 22:54

## 2017-10-09 RX ADMIN — FENTANYL CITRATE 25 MCG: 50 INJECTION INTRAMUSCULAR; INTRAVENOUS at 23:54

## 2017-10-09 ASSESSMENT — PAIN DESCRIPTION - PAIN TYPE
TYPE: ACUTE PAIN
TYPE: ACUTE PAIN

## 2017-10-09 ASSESSMENT — PAIN DESCRIPTION - LOCATION
LOCATION: CHEST
LOCATION: CHEST

## 2017-10-09 ASSESSMENT — PAIN DESCRIPTION - FREQUENCY: FREQUENCY: CONTINUOUS

## 2017-10-09 ASSESSMENT — PAIN DESCRIPTION - DIRECTION: RADIATING_TOWARDS: LEFT ARM

## 2017-10-09 ASSESSMENT — PAIN DESCRIPTION - ORIENTATION: ORIENTATION: LEFT

## 2017-10-09 ASSESSMENT — PAIN SCALES - GENERAL
PAINLEVEL_OUTOF10: 5
PAINLEVEL_OUTOF10: 7
PAINLEVEL_OUTOF10: 8

## 2017-10-09 ASSESSMENT — PAIN DESCRIPTION - DESCRIPTORS: DESCRIPTORS: ACHING;SHARP

## 2017-10-09 ASSESSMENT — PAIN DESCRIPTION - PROGRESSION: CLINICAL_PROGRESSION: GRADUALLY WORSENING

## 2017-10-09 ASSESSMENT — PAIN DESCRIPTION - ONSET: ONSET: ON-GOING

## 2017-10-10 ENCOUNTER — CARE COORDINATOR VISIT (OUTPATIENT)
Dept: CARE COORDINATION | Age: 57
End: 2017-10-10

## 2017-10-10 VITALS
RESPIRATION RATE: 16 BRPM | BODY MASS INDEX: 33.15 KG/M2 | OXYGEN SATURATION: 93 % | HEIGHT: 61 IN | WEIGHT: 175.6 LBS | DIASTOLIC BLOOD PRESSURE: 75 MMHG | TEMPERATURE: 97.6 F | SYSTOLIC BLOOD PRESSURE: 141 MMHG | HEART RATE: 67 BPM

## 2017-10-10 LAB
ABSOLUTE EOS #: 0 K/UL (ref 0–0.4)
ABSOLUTE LYMPH #: 1.7 K/UL (ref 1–4.8)
ABSOLUTE MONO #: 0.6 K/UL (ref 0.1–1.2)
ANION GAP SERPL CALCULATED.3IONS-SCNC: 15 MMOL/L (ref 9–17)
BASOPHILS # BLD: 0 % (ref 0–1)
BASOPHILS ABSOLUTE: 0 K/UL (ref 0–0.2)
BUN BLDV-MCNC: 14 MG/DL (ref 6–20)
BUN/CREAT BLD: 11 (ref 9–20)
CALCIUM SERPL-MCNC: 8.9 MG/DL (ref 8.6–10.4)
CHLORIDE BLD-SCNC: 102 MMOL/L (ref 98–107)
CO2: 24 MMOL/L (ref 20–31)
CREAT SERPL-MCNC: 1.23 MG/DL (ref 0.5–0.9)
DIFFERENTIAL TYPE: ABNORMAL
EOSINOPHILS RELATIVE PERCENT: 0 % (ref 1–7)
GFR AFRICAN AMERICAN: 55 ML/MIN
GFR NON-AFRICAN AMERICAN: 45 ML/MIN
GFR SERPL CREATININE-BSD FRML MDRD: ABNORMAL ML/MIN/{1.73_M2}
GFR SERPL CREATININE-BSD FRML MDRD: ABNORMAL ML/MIN/{1.73_M2}
GLUCOSE BLD-MCNC: 86 MG/DL (ref 70–99)
HCT VFR BLD CALC: 32.3 % (ref 36–46)
HEMOGLOBIN: 10.6 G/DL (ref 12–16)
LYMPHOCYTES # BLD: 13 % (ref 16–46)
MCH RBC QN AUTO: 30.4 PG (ref 26–34)
MCHC RBC AUTO-ENTMCNC: 32.7 G/DL (ref 31–37)
MCV RBC AUTO: 93 FL (ref 80–100)
MONOCYTES # BLD: 4 % (ref 4–11)
PDW BLD-RTO: 12.8 % (ref 11–14.5)
PLATELET # BLD: 242 K/UL (ref 140–450)
PLATELET ESTIMATE: ABNORMAL
PMV BLD AUTO: 8.8 FL (ref 6–12)
POTASSIUM SERPL-SCNC: 3.7 MMOL/L (ref 3.7–5.3)
RBC # BLD: 3.47 M/UL (ref 4–5.2)
RBC # BLD: ABNORMAL 10*6/UL
SEG NEUTROPHILS: 83 % (ref 43–77)
SEGMENTED NEUTROPHILS ABSOLUTE COUNT: 10.7 K/UL (ref 1.8–7.7)
SODIUM BLD-SCNC: 141 MMOL/L (ref 135–144)
TROPONIN INTERP: NORMAL
TROPONIN INTERP: NORMAL
TROPONIN T: <0.03 NG/ML
TROPONIN T: <0.03 NG/ML
WBC # BLD: 13 K/UL (ref 3.5–11)
WBC # BLD: ABNORMAL 10*3/UL

## 2017-10-10 PROCEDURE — 96375 TX/PRO/DX INJ NEW DRUG ADDON: CPT

## 2017-10-10 PROCEDURE — G0378 HOSPITAL OBSERVATION PER HR: HCPCS

## 2017-10-10 PROCEDURE — 93306 TTE W/DOPPLER COMPLETE: CPT

## 2017-10-10 PROCEDURE — 85025 COMPLETE CBC W/AUTO DIFF WBC: CPT

## 2017-10-10 PROCEDURE — 99214 OFFICE O/P EST MOD 30 MIN: CPT | Performed by: INTERNAL MEDICINE

## 2017-10-10 PROCEDURE — 6370000000 HC RX 637 (ALT 250 FOR IP): Performed by: PHYSICIAN ASSISTANT

## 2017-10-10 PROCEDURE — 99238 HOSP IP/OBS DSCHRG MGMT 30/<: CPT | Performed by: INTERNAL MEDICINE

## 2017-10-10 PROCEDURE — 6360000002 HC RX W HCPCS: Performed by: PHYSICIAN ASSISTANT

## 2017-10-10 PROCEDURE — 94760 N-INVAS EAR/PLS OXIMETRY 1: CPT

## 2017-10-10 PROCEDURE — 96372 THER/PROPH/DIAG INJ SC/IM: CPT

## 2017-10-10 PROCEDURE — 84484 ASSAY OF TROPONIN QUANT: CPT

## 2017-10-10 PROCEDURE — 2580000003 HC RX 258: Performed by: PHYSICIAN ASSISTANT

## 2017-10-10 PROCEDURE — 80048 BASIC METABOLIC PNL TOTAL CA: CPT

## 2017-10-10 PROCEDURE — 36415 COLL VENOUS BLD VENIPUNCTURE: CPT

## 2017-10-10 RX ORDER — ACETAMINOPHEN 325 MG/1
650 TABLET ORAL EVERY 4 HOURS PRN
Status: DISCONTINUED | OUTPATIENT
Start: 2017-10-10 | End: 2017-10-10 | Stop reason: HOSPADM

## 2017-10-10 RX ORDER — ONDANSETRON 2 MG/ML
4 INJECTION INTRAMUSCULAR; INTRAVENOUS EVERY 6 HOURS PRN
Status: DISCONTINUED | OUTPATIENT
Start: 2017-10-10 | End: 2017-10-10 | Stop reason: HOSPADM

## 2017-10-10 RX ORDER — ASPIRIN 81 MG/1
81 TABLET ORAL DAILY
Status: DISCONTINUED | OUTPATIENT
Start: 2017-10-10 | End: 2017-10-10

## 2017-10-10 RX ORDER — TRAZODONE HYDROCHLORIDE 50 MG/1
100 TABLET ORAL NIGHTLY
Status: DISCONTINUED | OUTPATIENT
Start: 2017-10-10 | End: 2017-10-10 | Stop reason: HOSPADM

## 2017-10-10 RX ORDER — ASPIRIN 81 MG/1
81 TABLET ORAL DAILY
Status: DISCONTINUED | OUTPATIENT
Start: 2017-10-10 | End: 2017-10-10 | Stop reason: HOSPADM

## 2017-10-10 RX ORDER — LOSARTAN POTASSIUM 100 MG/1
100 TABLET ORAL DAILY
Status: DISCONTINUED | OUTPATIENT
Start: 2017-10-10 | End: 2017-10-10 | Stop reason: HOSPADM

## 2017-10-10 RX ORDER — SODIUM CHLORIDE 9 MG/ML
INJECTION, SOLUTION INTRAVENOUS CONTINUOUS
Status: DISCONTINUED | OUTPATIENT
Start: 2017-10-10 | End: 2017-10-10 | Stop reason: HOSPADM

## 2017-10-10 RX ORDER — ATORVASTATIN CALCIUM 40 MG/1
40 TABLET, FILM COATED ORAL DAILY
Status: DISCONTINUED | OUTPATIENT
Start: 2017-10-10 | End: 2017-10-10 | Stop reason: HOSPADM

## 2017-10-10 RX ORDER — ATORVASTATIN CALCIUM 40 MG/1
40 TABLET, FILM COATED ORAL DAILY
Status: DISCONTINUED | OUTPATIENT
Start: 2017-10-10 | End: 2017-10-10

## 2017-10-10 RX ORDER — GLIMEPIRIDE 2 MG/1
4 TABLET ORAL DAILY
Status: DISCONTINUED | OUTPATIENT
Start: 2017-10-10 | End: 2017-10-10

## 2017-10-10 RX ORDER — METOPROLOL TARTRATE 100 MG/1
100 TABLET ORAL 2 TIMES DAILY
Status: DISCONTINUED | OUTPATIENT
Start: 2017-10-10 | End: 2017-10-10 | Stop reason: HOSPADM

## 2017-10-10 RX ORDER — CITALOPRAM 40 MG/1
40 TABLET ORAL DAILY
Status: DISCONTINUED | OUTPATIENT
Start: 2017-10-10 | End: 2017-10-10 | Stop reason: HOSPADM

## 2017-10-10 RX ORDER — SODIUM CHLORIDE 0.9 % (FLUSH) 0.9 %
10 SYRINGE (ML) INJECTION PRN
Status: DISCONTINUED | OUTPATIENT
Start: 2017-10-10 | End: 2017-10-10 | Stop reason: HOSPADM

## 2017-10-10 RX ORDER — BUPROPION HYDROCHLORIDE 150 MG/1
150 TABLET, EXTENDED RELEASE ORAL DAILY
Status: DISCONTINUED | OUTPATIENT
Start: 2017-10-10 | End: 2017-10-10 | Stop reason: HOSPADM

## 2017-10-10 RX ORDER — CITALOPRAM 20 MG/1
40 TABLET ORAL DAILY
Status: DISCONTINUED | OUTPATIENT
Start: 2017-10-10 | End: 2017-10-10

## 2017-10-10 RX ORDER — BUPROPION HYDROCHLORIDE 150 MG/1
150 TABLET, EXTENDED RELEASE ORAL DAILY
Status: DISCONTINUED | OUTPATIENT
Start: 2017-10-10 | End: 2017-10-10

## 2017-10-10 RX ORDER — RANOLAZINE 500 MG/1
500 TABLET, EXTENDED RELEASE ORAL 2 TIMES DAILY
Status: DISCONTINUED | OUTPATIENT
Start: 2017-10-10 | End: 2017-10-10 | Stop reason: HOSPADM

## 2017-10-10 RX ORDER — LOSARTAN POTASSIUM 50 MG/1
100 TABLET ORAL DAILY
Status: DISCONTINUED | OUTPATIENT
Start: 2017-10-10 | End: 2017-10-10

## 2017-10-10 RX ORDER — GLIMEPIRIDE 4 MG/1
4 TABLET ORAL DAILY
Status: DISCONTINUED | OUTPATIENT
Start: 2017-10-10 | End: 2017-10-10 | Stop reason: HOSPADM

## 2017-10-10 RX ORDER — CLOPIDOGREL BISULFATE 75 MG/1
75 TABLET ORAL DAILY
Status: DISCONTINUED | OUTPATIENT
Start: 2017-10-10 | End: 2017-10-10 | Stop reason: HOSPADM

## 2017-10-10 RX ORDER — AMLODIPINE BESYLATE 10 MG/1
10 TABLET ORAL DAILY
Status: DISCONTINUED | OUTPATIENT
Start: 2017-10-10 | End: 2017-10-10 | Stop reason: HOSPADM

## 2017-10-10 RX ORDER — ISOSORBIDE MONONITRATE 30 MG/1
30 TABLET, EXTENDED RELEASE ORAL DAILY
Status: DISCONTINUED | OUTPATIENT
Start: 2017-10-10 | End: 2017-10-10 | Stop reason: HOSPADM

## 2017-10-10 RX ORDER — ISOSORBIDE MONONITRATE 30 MG/1
30 TABLET, EXTENDED RELEASE ORAL DAILY
Status: DISCONTINUED | OUTPATIENT
Start: 2017-10-10 | End: 2017-10-10

## 2017-10-10 RX ORDER — LORAZEPAM 0.5 MG/1
0.5 TABLET ORAL NIGHTLY
Status: DISCONTINUED | OUTPATIENT
Start: 2017-10-10 | End: 2017-10-10 | Stop reason: HOSPADM

## 2017-10-10 RX ORDER — AMLODIPINE BESYLATE 5 MG/1
10 TABLET ORAL DAILY
Status: DISCONTINUED | OUTPATIENT
Start: 2017-10-10 | End: 2017-10-10

## 2017-10-10 RX ORDER — METOPROLOL TARTRATE 50 MG/1
100 TABLET, FILM COATED ORAL 2 TIMES DAILY
Status: DISCONTINUED | OUTPATIENT
Start: 2017-10-10 | End: 2017-10-10

## 2017-10-10 RX ORDER — CLOPIDOGREL BISULFATE 75 MG/1
75 TABLET ORAL DAILY
Status: DISCONTINUED | OUTPATIENT
Start: 2017-10-10 | End: 2017-10-10

## 2017-10-10 RX ORDER — SODIUM CHLORIDE 0.9 % (FLUSH) 0.9 %
10 SYRINGE (ML) INJECTION EVERY 12 HOURS SCHEDULED
Status: DISCONTINUED | OUTPATIENT
Start: 2017-10-10 | End: 2017-10-10 | Stop reason: HOSPADM

## 2017-10-10 RX ORDER — RANOLAZINE 500 MG/1
500 TABLET, EXTENDED RELEASE ORAL 2 TIMES DAILY
Status: DISCONTINUED | OUTPATIENT
Start: 2017-10-10 | End: 2017-10-10

## 2017-10-10 RX ADMIN — ONDANSETRON 4 MG: 2 INJECTION INTRAMUSCULAR; INTRAVENOUS at 00:54

## 2017-10-10 RX ADMIN — BUPROPION HYDROCHLORIDE 150 MG: 150 TABLET, EXTENDED RELEASE ORAL at 13:43

## 2017-10-10 RX ADMIN — GLIMEPIRIDE 4 MG: 4 TABLET ORAL at 13:44

## 2017-10-10 RX ADMIN — LORAZEPAM 0.5 MG: 0.5 TABLET ORAL at 00:57

## 2017-10-10 RX ADMIN — SODIUM CHLORIDE: 9 INJECTION, SOLUTION INTRAVENOUS at 00:54

## 2017-10-10 RX ADMIN — AMLODIPINE BESYLATE 10 MG: 10 TABLET ORAL at 13:40

## 2017-10-10 RX ADMIN — RANOLAZINE 500 MG: 500 TABLET, EXTENDED RELEASE ORAL at 13:44

## 2017-10-10 RX ADMIN — TRAZODONE HYDROCHLORIDE 100 MG: 50 TABLET ORAL at 00:57

## 2017-10-10 RX ADMIN — METOPROLOL TARTRATE 100 MG: 50 TABLET ORAL at 00:57

## 2017-10-10 RX ADMIN — ENOXAPARIN SODIUM 40 MG: 40 INJECTION SUBCUTANEOUS at 09:50

## 2017-10-10 RX ADMIN — LOSARTAN POTASSIUM 100 MG: 100 TABLET ORAL at 13:40

## 2017-10-10 RX ADMIN — ISOSORBIDE MONONITRATE 30 MG: 30 TABLET, EXTENDED RELEASE ORAL at 13:43

## 2017-10-10 RX ADMIN — CITALOPRAM 40 MG: 40 TABLET ORAL at 13:40

## 2017-10-10 ASSESSMENT — PAIN SCALES - GENERAL
PAINLEVEL_OUTOF10: 0
PAINLEVEL_OUTOF10: 5

## 2017-10-10 ASSESSMENT — PAIN DESCRIPTION - DESCRIPTORS
DESCRIPTORS: ACHING;DULL
DESCRIPTORS: ACHING;DULL

## 2017-10-10 ASSESSMENT — PAIN SCALES - WONG BAKER
WONGBAKER_NUMERICALRESPONSE: 2
WONGBAKER_NUMERICALRESPONSE: 0
WONGBAKER_NUMERICALRESPONSE: 2

## 2017-10-10 ASSESSMENT — PAIN DESCRIPTION - ORIENTATION
ORIENTATION: LEFT
ORIENTATION: LEFT

## 2017-10-10 ASSESSMENT — PAIN DESCRIPTION - LOCATION
LOCATION: CHEST
LOCATION: ARM
LOCATION: CHEST

## 2017-10-10 ASSESSMENT — PAIN DESCRIPTION - PAIN TYPE
TYPE: ACUTE PAIN

## 2017-10-10 ASSESSMENT — PAIN DESCRIPTION - FREQUENCY
FREQUENCY: INTERMITTENT
FREQUENCY: INTERMITTENT

## 2017-10-10 NOTE — PROGRESS NOTES
Hospitalist Progress Note    Patient:  Anand Mc     YOB: 1960    MRN: [id number]   Admit date: 10/9/2017     Acct: [de-identified]     PCP: Cheri Person MD    CC--Interval History:   Chest pain---weeks--recent addition of Ranexa---MI ruled out---2D ECHO---pending---Cardiology to see    See note below    All other ROS negative except noted in HPI    Diet:  Diet NPO, After Midnight    Medications:  Scheduled Meds:   amLODIPine  10 mg Oral Daily    aspirin EC  81 mg Oral Daily    atorvastatin  40 mg Oral Daily    buPROPion  150 mg Oral Daily    citalopram  40 mg Oral Daily    clopidogrel  75 mg Oral Daily    glimepiride  4 mg Oral Daily    isosorbide mononitrate  30 mg Oral Daily    linagliptin  5 mg Oral Daily    LORazepam  0.5 mg Oral Nightly    losartan  100 mg Oral Daily    metoprolol  100 mg Oral BID    ranolazine  500 mg Oral BID    traZODone  100 mg Oral Nightly    sodium chloride flush  10 mL Intravenous 2 times per day    enoxaparin  40 mg Subcutaneous Daily     Continuous Infusions:   sodium chloride 75 mL/hr at 10/10/17 0054     PRN Meds:sodium chloride flush, acetaminophen, magnesium hydroxide, ondansetron, nitroGLYCERIN    Objective:  Labs:  CBC with Differential:    Lab Results   Component Value Date    WBC 13.0 10/10/2017    RBC 3.47 10/10/2017    RBC 3.90 12/09/2011    HGB 10.6 10/10/2017    HCT 32.3 10/10/2017     10/10/2017     12/09/2011    MCV 93.0 10/10/2017    MCH 30.4 10/10/2017    MCHC 32.7 10/10/2017    RDW 12.8 10/10/2017    LYMPHOPCT 13 10/10/2017    MONOPCT 4 10/10/2017    BASOPCT 0 10/10/2017    MONOSABS 0.60 10/10/2017    LYMPHSABS 1.70 10/10/2017    EOSABS 0.00 10/10/2017    BASOSABS 0.00 10/10/2017    DIFFTYPE NOT REPORTED 10/10/2017     BMP:    Lab Results   Component Value Date     10/10/2017    K 3.7 10/10/2017     10/10/2017    CO2 24 10/10/2017    BUN 14 10/10/2017    LABALBU 4.0 09/19/2014    CREATININE 1.23 10/10/2017 CALCIUM 8.9 10/10/2017    GFRAA 55 10/10/2017    LABGLOM 45 10/10/2017    GLUCOSE 86 10/10/2017    GLUCOSE 355 12/09/2011     Last 3 Troponin:    Lab Results   Component Value Date    TROPONINI NOT REPORTED 08/25/2014    TROPONINI NOT REPORTED 05/05/2014    TROPONINI <0.01 12/09/2011    TROPONINI 0.01 12/09/2011           Physical Exam:  Vitals: /64   Pulse 68   Temp 97.8 °F (36.6 °C) (Oral)   Resp 16   Ht 5' 1\" (1.549 m)   Wt 175 lb 9.6 oz (79.7 kg)   SpO2 90%   BMI 33.18 kg/m²   24 hour intake/output:  Intake/Output Summary (Last 24 hours) at 10/10/17 1031  Last data filed at 10/10/17 0557   Gross per 24 hour   Intake              414 ml   Output                0 ml   Net              414 ml     Last 3 weights: Wt Readings from Last 3 Encounters:   10/10/17 175 lb 9.6 oz (79.7 kg)   09/27/17 172 lb (78 kg)   07/03/17 173 lb 15.1 oz (78.9 kg)     HEENT: Normocephalic and Atraumatic  Neck: Supple, No Bruits, No Masses, Tenderness, Nodularity and No Lymphadenopathy  Chest/Lungs: Clear to Auscultation without Rales, Rhonchi, or Wheezes ----no chest wall pain to palpation--inhalation exhalation  Cardiac: Regular Rate and Rhythm  GI/Abdomen:  Bowel Sounds Present, Soft, Non-tender, without Guarding or Rebound Tenderness, No Masses and No Tenderness  : Not examined  EXT/Skin: No Edema, No Cyanosis and No Clubbing  Neuro: Alert and Oriented and No Localizing Signs/Symptoms      Assessment:    Principal Problem:    Coronary artery disease involving native coronary artery of native heart with angina pectoris (HCC)    SHANNON RODRIGUEZ dc    FP  57  WF  [[facility];  [facility]]  FULL CODE       PLAVIXLOVENOX     Chest pain---10.9.2017weeks       MI ruled out---10.10.2017       EKG---10.9.2017NSR81NACs  ASCVD        Cardiac catheterization4.11.2013widely patent prior ramus                      stents high grade very distal small vessel LAD---ZINA-LAD---

## 2017-10-10 NOTE — PLAN OF CARE
Problem: Pain:  Goal: Pain level will decrease  Pain level will decrease   Outcome: Ongoing    Goal: Control of chronic pain  Control of chronic pain   Outcome: Ongoing      Problem: Cardiovascular  Goal: No DVT, peripheral vascular complications  Outcome: Ongoing    Goal: Hemodynamic stability  Outcome: Ongoing    Goal: Anticoagulate/Hct stable  Outcome: Ongoing    Goal: Understanding of dietary restrictions  Outcome: Ongoing

## 2017-10-10 NOTE — ED PROVIDER NOTES
catheterization (2009); fracture surgery (Right, 10/1998); Upper gastrointestinal endoscopy (2005); Cardiac catheterization (2013);  section ();  section (1985); Breast biopsy (Right, 9-15-14); Breast lumpectomy (Right, 10-1-2014); Finger trigger release (Left, 2015); Coronary angioplasty (2009); and Coronary angioplasty (2013). CURRENT MEDICATIONS       Previous Medications    AMLODIPINE (NORVASC) 10 MG TABLET    TAKE ONE TABLET BY MOUTH ONCE DAILY    ASPIRIN 81 MG TABLET    Take 81 mg by mouth daily. ATORVASTATIN (LIPITOR) 40 MG TABLET    TAKE ONE TABLET BY MOUTH ONCE DAILY    BLOOD GLUCOSE MONITORING SUPPL SUPPLIES MISC    1 Glucometer, test strips x 100 with 5 refills, 1 lancet device. Patient to test BID and prn.     BUPROPION (WELLBUTRIN SR) 150 MG EXTENDED RELEASE TABLET    TAKE ONE TABLET BY MOUTH ONCE DAILY    CITALOPRAM (CELEXA) 40 MG TABLET    TAKE ONE TABLET BY MOUTH ONCE DAILY    CLOPIDOGREL (PLAVIX) 75 MG TABLET    TAKE ONE TABLET BY MOUTH ONCE DAILY    GLIMEPIRIDE (AMARYL) 4 MG TABLET    Take 1 tablet by mouth 2 times daily (with meals)    GLIMEPIRIDE (AMARYL) 4 MG TABLET    TAKE ONE TABLET BY MOUTH ONCE DAILY    ISOSORBIDE MONONITRATE (IMDUR) 30 MG EXTENDED RELEASE TABLET    Take 1 tablet by mouth daily    LINAGLIPTIN (TRADJENTA) 5 MG TABLET    TAKE ONE TABLET BY MOUTH ONCE DAILY    LORAZEPAM (ATIVAN) 0.5 MG TABLET    TAKE ONE TABLET BY MOUTH AT BEDTIME AS NEEDED    LORAZEPAM (ATIVAN) 0.5 MG TABLET    TAKE ONE TABLET BY MOUTH AT BEDTIME AS NEEDED    LOSARTAN (COZAAR) 100 MG TABLET    TAKE ONE TABLET BY MOUTH ONCE DAILY    METFORMIN (GLUCOPHAGE-XR) 500 MG EXTENDED RELEASE TABLET    TAKE ONE TABLET BY MOUTH TWICE DAILY    METOPROLOL (LOPRESSOR) 100 MG TABLET    Take 1 tablet by mouth 2 times daily    NITROGLYCERIN (NITROSTAT) 0.4 MG SL TABLET    Place 1 tablet under the tongue every 5 minutes as needed for Chest pain    RANOLAZINE (RANEXA) 500 MG EXTENDED RELEASE TABLET    Take 1 tablet by mouth 2 times daily    TRAZODONE (DESYREL) 100 MG TABLET    TAKE ONE TABLET BY MOUTH ONCE NIGHTLY       ALLERGIES     is allergic to codeine and morphine. FAMILY HISTORY     indicated that her mother is . She indicated that her father is . She indicated that her maternal grandmother is . She indicated that her maternal grandfather is . She indicated that her paternal grandmother is . She indicated that her paternal grandfather is . She indicated that her daughter is alive. She indicated that the status of her neg hx is unknown.      family history includes Breast Cancer (age of onset: 62) in her mother; Cataracts in her mother; Diabetes in her father; Heart Attack in her father; Heart Disease in her father; Hypertension in her mother. SOCIAL HISTORY      reports that she has never smoked. She has never used smokeless tobacco. She reports that she does not drink alcohol or use drugs. PHYSICAL EXAM     INITIAL VITALS:  height is 5' 1\" (1.549 m) and weight is 167 lb (75.8 kg). Her tympanic temperature is 97.3 °F (36.3 °C). Her blood pressure is 124/71 and her pulse is 79. Her respiration is 16 and oxygen saturation is 94%. Gen.: Patient is a middle-aged female in no apparent distress. HEENT: Head is atraumatic. Mouth shows moist mucous membranes. Neck: Supple. No meningismus. Respiratory: Lung sounds are clear bilateral without wheezes or rhonchi. Cardiac: Heart is regular rate and rhythm. Chest wall is nontender. Peripheral exam no cyanosis, edema, good distal pulses.   GI: Abdomen soft, nontender  Neuro: Patient is no gross focal neurologic deficits    DIFFERENTIAL DIAGNOSIS/ MDM:     Atypical chest pain, acute coronary syndrome    DIAGNOSTIC RESULTS     EKG: All EKG's are interpreted by the Emergency Department Physician who either signs or Co-signs this chart in the absence of a cardiologist.    EKG interpreted by me, reveals normal sinus rhythm at a rate of 81 with no acute ST elevations or depressions. Normal MI interval, normal, QS complex, normal axis, no arrhythmia    RADIOLOGY:   I directly visualized the following  images and reviewed the radiologist interpretations:  XR Chest Portable   Final Result   No acute process. LABS:  Labs Reviewed   CBC WITH AUTO DIFFERENTIAL - Abnormal; Notable for the following:        Result Value    RBC 3.89 (*)     All other components within normal limits   BASIC METABOLIC PANEL - Abnormal; Notable for the following:     CREATININE 1.18 (*)     GFR Non- 47 (*)     GFR  57 (*)     All other components within normal limits   TROPONIN         EMERGENCY DEPARTMENT COURSE:   Vitals:    Vitals:    10/09/17 2257 10/09/17 2302 10/09/17 2332 10/09/17 2342   BP: (!) 133/90 124/66 136/74 124/71   Pulse: 82 81 80 79   Resp: 20 20 15 16   Temp:       TempSrc:       SpO2: 97% 96% 95% 94%   Weight:       Height:         -------------------------  BP: 124/71, Temp: 97.3 °F (36.3 °C), Pulse: 79, Resp: 16    Orders Placed This Encounter   Medications    aspirin chewable tablet 324 mg    nitroGLYCERIN (NITROSTAT) SL tablet 0.4 mg    ondansetron (ZOFRAN-ODT) disintegrating tablet 4 mg    fentaNYL (SUBLIMAZE) injection 25 mcg           Re-evaluation Notes    Patient still appears to be extremely comfortable. However, she states pain is not being helped nausea is better. Labs are unremarkable. EKG and chest x-ray are normal.  This, time, and I do feel she needs admission for further evaluation. Did speak with Kathy Cortez, who is agreeable        FINAL IMPRESSION      1. Chest pain, unspecified type          DISPOSITION/PLAN   DISPOSITION Decision to Admit    Condition on Disposition    stable    PATIENT REFERRED TO:  No follow-up provider specified.     DISCHARGE MEDICATIONS:  New Prescriptions    No medications on file       (Please note

## 2017-10-10 NOTE — CONSULTS
mouth 2 times daily (with meals) 3/30/17   Purnima Montilla MD       linagliptin, 5 mg, Oral, Daily    LORazepam, 0.5 mg, Oral, Nightly    traZODone, 100 mg, Oral, Nightly    sodium chloride flush, 10 mL, Intravenous, 2 times per day    enoxaparin, 40 mg, Subcutaneous, Daily    aspirin EC, 81 mg, Oral, Daily    buPROPion, 150 mg, Oral, Daily    amLODIPine, 10 mg, Oral, Daily    atorvastatin, 40 mg, Oral, Daily    [START ON 10/11/2017] citalopram, 40 mg, Oral, Daily    [START ON 10/11/2017] clopidogrel, 75 mg, Oral, Daily    [START ON 10/11/2017] glimepiride, 4 mg, Oral, Daily    [START ON 10/11/2017] isosorbide mononitrate, 30 mg, Oral, Daily    [START ON 10/11/2017] losartan, 100 mg, Oral, Daily    metoprolol, 100 mg, Oral, BID    ranolazine, 500 mg, Oral, BID      Allergies:  Codeine and Morphine    Social History:   reports that she has never smoked. She has never used smokeless tobacco. She reports that she does not drink alcohol or use drugs. Family History: family history includes Breast Cancer (age of onset: 62) in her mother; Cataracts in her mother; Diabetes in her father; Heart Attack in her father; Heart Disease in her father; Hypertension in her mother. No h/o sudden cardiac death. REVIEW OF SYSTEMS:    · Constitutional: there has been no unanticipated weight loss. There's been No change in energy level, No change in activity level. · Eyes: No visual changes or diplopia. No scleral icterus. · ENT: No Headaches, hearing loss or vertigo. No mouth sores or sore throat. · Cardiovascular: No cardiac history  · Respiratory: No previous pulmonary problems  · Gastrointestinal: No abdominal pain, appetite loss, blood in stools. No change in bowel or bladder habits. · Genitourinary: No dysuria, trouble voiding, or hematuria. · Musculoskeletal:  No gait disturbance, No weakness or joint complaints. · Integumentary: No rash or pruritis.   · Neurological: No headache, diplopia, change in 10/09/17   2238  10/10/17   0411   NA  143  141   K  4.0  3.7   CO2  30  24   BUN  12  14   CREATININE  1.18*  1.23*   LABGLOM  47*  45*   GLUCOSE  92  86     BNP: No results for input(s): BNP in the last 72 hours. PT/INR: No results for input(s): PROTIME, INR in the last 72 hours. APTT:No results for input(s): APTT in the last 72 hours. CARDIAC ENZYMES:No results for input(s): CKTOTAL, CKMB, CKMBINDEX, TROPONINI in the last 72 hours. FASTING LIPID PANEL:  Lab Results   Component Value Date    HDL 49 03/09/2017    TRIG 140 03/09/2017     LIVER PROFILE:No results for input(s): AST, ALT, LABALBU in the last 72 hours. Other Current Problems  Patient Active Problem List   Diagnosis    Coronary artery disease involving native coronary artery of native heart with angina pectoris ([facility]    Uncontrolled type 2 diabetes mellitus, without long-term current use of insulin ([facility]    Hyperlipidemia    Hypertension    Anxiety    Obstructive sleep apnea    Major depressive disorder in full remission ([facility]    Gastroesophageal reflux disease    Hypomagnesemia    Obesity    Insomnia    Chest pain    Background diabetic retinopathy ([facility]           IMPRESSION & Recommendations:    1. Non Cardiac Chest Wall Pain. Not ACS. Has some risk factors. 3 negative troponins. EKG unremarkable. Acute MI ruled out by serial troponins. 2. Aggressive lifestyle and risk factor modifications discussed extensively with patient. 3. No further testing. Ok to follow up as outpatient. Discussed with patient, family, and Nurse. Electronically signed by Eun Orr DO on 10/10/2017 at 12:39 PM    Eun Orr, [id number] [facility]  [employer]  [phone]

## 2017-10-10 NOTE — PROGRESS NOTES
Note addition of 2D ECHO results   Note anemia--consider outpatient evaluation  Low normal range O2 sat--recheck outpatient recommended        SHANNON RODRIGUEZ        [place]    FP  65  WF  [ge [place] [facility] [place];  [facility]]  FULL CODE       PLAVIXLOVENOX     Chest pain---10.9.2017weeks       MI ruled out---10.10.2017       EKG---10.9.2017NSR81NACs       2D ECHO---10.10.2017LAE--NLVSFnormal appearing                       valvestrivial MR-TRGrade 1 DDLVEF ~ 60-65%  ASCVD        Cardiac catheterization4.11.2013widely patent prior ramus                      stents high grade very distal small vessel LAD---ZINA-LAD---                      otherwise nonobstructive small branch vessel disease--NLVSF         Cardiac catheterization---7.14.2017LAD-diffuse plaque disease                       mild irregularities left circumflexdiffuse RCA plaque                       ZINA stent proximal ramus                                                 Cardiac catherization9.11.2009second stent beyond first stent in ramus                                               Low normal oxygen level---RA O2sat = 90%---10.9.2017                                              Hypertension                                              Hyperlipidemia                                              Diabetes Mellitus Type 2                                                    Background diabetic retinopathy                                              Obesity                                              Sleep apnea                                              GERD                                              Depression---anxiety                                                Anemia                                               PMH:  insomnia,                                               PSH:   see above, hysterectomy---no BSO---cervix [id number],

## 2017-10-10 NOTE — ED NOTES
Pt to room 4 with c/o chest pain that has been ongoing for a few weeks but became worse yesterday into today. States pain is in left chest and radiates to left arm, pain in left arm is worse. C/O some nausea and SOB, and N/T in left arm. States she saw Dr. Tiny Martell last Wednesday for the pain and was pur on ranexa and told if that did not work then she would need another stent. Pt states she feels a vibration in chest and when she gets that feeling she needs and stent. Hx of 4 cardiac stents with last being 3 years ago.       Trina Abel RN  10/09/17 [id number]

## 2017-10-10 NOTE — H&P
TABLET BY MOUTH ONCE DAILY  losartan (COZAAR) 100 MG tablet, TAKE ONE TABLET BY MOUTH ONCE DAILY  linagliptin (TRADJENTA) 5 MG tablet, TAKE ONE TABLET BY MOUTH ONCE DAILY  nitroGLYCERIN (NITROSTAT) 0.4 MG SL tablet, Place 1 tablet under the tongue every 5 minutes as needed for Chest pain  LORazepam (ATIVAN) 0.5 MG tablet, TAKE ONE TABLET BY MOUTH AT BEDTIME AS NEEDED  metoprolol (LOPRESSOR) 100 MG tablet, Take 1 tablet by mouth 2 times daily  isosorbide mononitrate (IMDUR) 30 MG extended release tablet, Take 1 tablet by mouth daily  aspirin 81 MG tablet, Take 81 mg by mouth daily. LORazepam (ATIVAN) 0.5 MG tablet, TAKE ONE TABLET BY MOUTH AT BEDTIME AS NEEDED  Blood Glucose Monitoring Suppl Supplies MISC, 1 Glucometer, test strips x 100 with 5 refills, 1 lancet device. Patient to test BID and prn.  glimepiride (AMARYL) 4 MG tablet, Take 1 tablet by mouth 2 times daily (with meals)    Allergies:  Codeine and Morphine    Social History:   Social History     Social History    Marital status:      Spouse name: N/A    Number of children: N/A    Years of education: N/A     Occupational History    Not on file.      Social History Main Topics    Smoking status: Never Smoker    Smokeless tobacco: Never Used      Comment: thelma ramirez 10/10/17    Alcohol use No    Drug use: No    Sexual activity: Not on file     Other Topics Concern    Not on file     Social History Narrative    No narrative on file         Family History:       Problem Relation Age of Onset    Breast Cancer Mother 62    Hypertension Mother     Cataracts Mother     Diabetes Father     Heart Disease Father     Heart Attack Father     Glaucoma Neg Hx      REVIEW OF SYSTEMS:  CONSTITUTIONAL:  negative  EYES:  negative  HEENT:  negative  RESPIRATORY:  negative  CARDIOVASCULAR:  positive for  chest pain  GASTROINTESTINAL:  negative  MUSCULOSKELETAL:  negative  NEUROLOGICAL:  negative  BEHAVIOR/PSYCH:  negative  PHYSICAL EXAM:    Vitals:  /74   Pulse 80   Temp 99.9 °F (37.7 °C) (Oral)   Resp 12   Ht 5' 1\" (1.549 m)   Wt 175 lb 9.6 oz (79.7 kg)   SpO2 96%   BMI 33.18 kg/m²     CONSTITUTIONAL:  awake, alert, cooperative, no apparent distress, and appears stated age  EYES:  Lids and lashes normal, pupils equal, round and reactive to light, extra ocular muscles intact, sclera clear, conjunctiva normal  ENT:  Normocephalic, without obvious abnormality, atraumatic, sinuses nontender on palpation, external ears without lesions, oral pharynx with moist mucus membranes, tonsils without erythema or exudates, gums normal and good dentition. NECK:  Supple, symmetrical, trachea midline, no adenopathy, thyroid symmetric, not enlarged and no tenderness, skin normal  BACK:  Symmetric, no curvature, spinous processes are non-tender on palpation, paraspinous muscles are non-tender on palpation, no costal vertebral tenderness  LUNGS:  No increased work of breathing, good air exchange, clear to auscultation bilaterally, no crackles or wheezing  CARDIOVASCULAR:  Normal apical impulse, regular rate and rhythm, normal S1 and S2, no S3 or S4, and no murmur noted  ABDOMEN:  No scars, normal bowel sounds, soft, non-distended, non-tender, no masses palpated, no hepatosplenomegally  MUSCULOSKELETAL:  There is no redness, warmth, or swelling of the joints. Full range of motion noted. Motor strength is 5 out of 5 all extremities bilaterally. Tone is normal.  NEUROLOGIC:  Awake, alert, oriented to name, place and time. Cranial nerves II-XII are grossly intact. Motor is 5 out of 5 bilaterally. Cerebellar finger to nose, heel to shin intact. Sensory is intact.   Babinski down going, Romberg negative, and gait is normal.  SKIN:  no bruising or bleeding, normal skin color, texture, turgor and no redness, warmth, or swelling    DATA:  CBC with Differential:    Lab Results   Component Value Date    WBC 9.2 10/09/2017    RBC 3.89 10/09/2017    RBC

## 2017-10-11 ENCOUNTER — TELEPHONE (OUTPATIENT)
Dept: FAMILY MEDICINE CLINIC | Age: 57
End: 2017-10-11

## 2017-10-11 NOTE — DISCHARGE SUMMARY
The  patient presented with chest pain. She ruled out for myocardial infarction. EKG unremarkable. Troponins are less than 0.03. The patient seen by  Cardiology. The patient discharged to home 10/10/2017. No new  medications added to the patient's regimen. The patient complained  initially of left chest with vibrating, humming sensation being  present for approximately two-week period, sometimes with activity or  may be with rest, also has noticed some increasing shortness of  breath. No bilateral lower extremity edema. No cough. No orthopnea. No PND. The patient is a nonsmoker. LABORATORY DATA:  Around the time of discharge, white cell count 13.0,  hemoglobin 10.6, down from 12.0 due to IV fluids. Hematocrit 32.3,  platelets 527,673. Sodium 141, potassium 3.7, chloride 102, CO2 24,  BUN 14, creatinine 1.2, glucose 86, calcium 8.9, GFR 45, troponins all  less than 0.03. DISCHARGE INSTRUCTIONS:  FOLLOW UP:  Discharged to home on 10/10/2017. DIET:  Cardiac, diabetic. ACTIVITY:  As tolerated. DISCHARGE MEDICATIONS:  Changes,  1. Glimepiride (Amaryl) one tablet p.o. daily. 2.  Lorazepam (Ativan) 0.5 mg p.o. q.h.s. p.r.n. sleep, anxiety. FOLLOWING MEDICATIONS CONTINUED, NO CHANGE:  1. Amlodipine (Norvasc) 10 mg p.o. daily. 2.  Aspirin 81 mg p.o. daily. 3.  Atorvastatin (Lipitor) 40 mg p.o. daily. 4.  Wellbutrin  mg p.o. daily. 5.  Citalopram (Celexa) 40 mg p.o. daily. 6.  Clopidogrel (Plavix) 75 mg p.o. daily. 7.  Imdur 30 mg p.o. daily. 8.  Tradjenta (Linagliptin) 5 mg tablet one p.o. daily. 9.  Losartan (Cozaar) 100 mg p.o. daily. 10.  Metformin (Glucophage XR) extended release tablet one p.o. daily. 11.  Metoprolol (Lopressor) 100 mg p.o. b.i.d.  12.  Nitroglycerin 0.4 mg sublingual q.5 minutes x3 p.r.n. chest pain. 13.  Ranexa 500 mg p.o. b.i.d.  14.  Trazodone (Desyrel) 100 mg p.o. nightly.     FOLLOW UP:  With the patient's personal physician, Michael Ramos M. D.  at [facility], [facility]. Follow up with the  patient's cardiologist, [facility], [facility]  [facility]. NOTE:  Room air oxygen saturation, approximately 90%. May need to be  further evaluated in the outpatient setting if persistent. Note this  is a single measurement. DISCHARGE INSTRUCTIONS:  Follow up as above. Any aspect of the patient's care not discussed in the chart and/or  dictation will be addressed and treated as an outpatient. The patient's medications have been reviewed including, but not  limited to, pre-hospital, hospital and discharge medications. The  patient and/or the patient's personal representatives were  specifically advised the only medications to be taken are those set  forth in the discharge orders and no other medications should be  taken. Any prior medications not on the discharge orders are  specifically discontinued. The patient has been advised of the financial relationship and  ownership interests between [facility] physicians  and employees of [facility] and [facility].         Danny Piña    D: 10/10/2017 15:38:53       T: 10/11/2017 7:25:34     [email or web address]  Job#: [id number]     Doc#: [id number]

## 2017-10-12 NOTE — TELEPHONE ENCOUNTER
1518 [facility] Discharge Phone 1443 [facility] Transitions Initial Follow Up Call    Call within 2 business days of discharge: Yes    1. Hospital Information    Discharged from: [id number]    Discharge to home  Discharge Date: 10/10/2017   Admission Date: Chest Pain    Non-face-to-face services provided:  Obtained and reviewed discharge summary and/or continuity of care documents    Hospital records: obtained and reviewed    Was instructed to follow up in: 7-10 days    Hospital Diagnosis: [id number] Consultants:  Cardiology-Wednesday Oct. 18th. Initial contact Date: 10/12/2017  Type of Contact:  by phone      2. Assessment of Current Condition      Questions: How have you felt since discharge from the hospital:     unchanged    Did you receive a discharge summary from the hospital? yes      Are you eating and drinking OK? yes    Are there any new complaints of pain? No    If you have a wound - is the dressing clean, dry, and intact? N/A    Reinforce Education    Does the patient know -   1. What to do if her symptoms worsen? yes   2. For what symptoms she should call the doctor?  yes   3. What symptoms she should get help with right away? Yes SOB, Chest Pain, Elevated BP   4. Does she know how to contact her physician?  yes    Are there any questions about the patients medications? No   Does the patient have a list of all the medications that were prescribed to be taken after discharge? yes   Does the patient have all the medications that were prescribed?  yes   Is the patient taking all the prescribed medications?   yes   Does the patient understand what all the medications are taken for? yes      ( Update medication list and refresh medication smartlinks below)        All Active Meds in Chart - (keep all current active meds, add hospital meds)  Current Outpatient Prescriptions   Medication Sig Dispense Refill    Blood Glucose Monitoring Suppl MICHELLE 1 Device by Does not apply LOLLY Wick. Are there any questions about how the patient should take care of herself? No   Does the patient understand her diet regimen? yes   Does the patient understand his or her activity level? yes   Does the patient understand how to care for her wound? yes   Does the patient understand how to monitor her weight?  yes   Does the patient understand what to do if she becomes short of breath? yes   Does the patient understand what to do if she has increased edema? yes    Does the patient understand how to monitor Blood sugar? N/A    Is the patient having any trouble with ADL's? No        Home care services initiated: no     Services provided: N/A      Does that patient have equipment needs? No    Reinforce Follow-Up  - Does patient have an appointment with her PCP? yes  - Does patient have an appointment with her specialist physicians? Yes        Follow up appointment date: (7 days for more severe illness, 14 days for others)  Future Appointments  Date Time Provider Hank Avalos   10/16/2017 8:00 AM Rolando Smith NP [facility]   10/18/2017 11:00 AM Citlaly Sheehan MD [facility]   11/15/2017 2:40 PM Viv Casillas MD [facility]       Other Interventions or Actions taken as result of  Assessment  - Pt not happy with care given while hospitalized. Pt stated that \"they treated me like I was faking\" Pt has significant history of STENT placement as well as family hx of Heart Disease and is still having Chest pains, writer advised pt to go to ER-but she stated \"I'm not going back to a place where they treated me so badly\" Writer did advise there is another ER across [facility] to try-pt may try that route. Writer will forward this message onto proper management. Thank you!         Roxane Lang LPN

## 2017-10-13 LAB
EKG ATRIAL RATE: 81 BPM
EKG P AXIS: 25 DEGREES
EKG P-R INTERVAL: 136 MS
EKG Q-T INTERVAL: 404 MS
EKG QRS DURATION: 78 MS
EKG QTC CALCULATION (BAZETT): 469 MS
EKG R AXIS: 2 DEGREES
EKG T AXIS: 28 DEGREES
EKG VENTRICULAR RATE: 81 BPM

## 2017-11-01 ENCOUNTER — CARE COORDINATION (OUTPATIENT)
Dept: CARE COORDINATION | Age: 57
End: 2017-11-01

## 2017-11-07 DIAGNOSIS — I25.119 CORONARY ARTERY DISEASE INVOLVING NATIVE CORONARY ARTERY OF NATIVE HEART WITH ANGINA PECTORIS (HCC): ICD-10-CM

## 2017-11-07 RX ORDER — CLOPIDOGREL BISULFATE 75 MG/1
TABLET ORAL
Qty: 90 TABLET | Refills: 0 | Status: SHIPPED | OUTPATIENT
Start: 2017-11-07 | End: 2018-03-02 | Stop reason: SDUPTHER

## 2017-11-07 RX ORDER — ATORVASTATIN CALCIUM 40 MG/1
TABLET, FILM COATED ORAL
Qty: 90 TABLET | Refills: 0 | Status: SHIPPED | OUTPATIENT
Start: 2017-11-07 | End: 2018-03-02 | Stop reason: SDUPTHER

## 2017-11-07 RX ORDER — TRAZODONE HYDROCHLORIDE 100 MG/1
TABLET ORAL
Qty: 90 TABLET | Refills: 0 | Status: SHIPPED | OUTPATIENT
Start: 2017-11-07 | End: 2018-02-03 | Stop reason: SDUPTHER

## 2017-11-15 ENCOUNTER — CARE COORDINATION (OUTPATIENT)
Dept: CARE COORDINATION | Age: 57
End: 2017-11-15

## 2017-12-01 ENCOUNTER — CARE COORDINATION (OUTPATIENT)
Dept: CARE COORDINATION | Age: 57
End: 2017-12-01

## 2018-01-03 RX ORDER — BUPROPION HYDROCHLORIDE 150 MG/1
TABLET, EXTENDED RELEASE ORAL
Qty: 90 TABLET | Refills: 0 | Status: SHIPPED | OUTPATIENT
Start: 2018-01-03 | End: 2018-05-04 | Stop reason: SDUPTHER

## 2018-01-15 ENCOUNTER — CARE COORDINATION (OUTPATIENT)
Dept: CARE COORDINATION | Age: 58
End: 2018-01-15

## 2018-01-30 ENCOUNTER — CARE COORDINATION (OUTPATIENT)
Dept: CARE COORDINATION | Age: 58
End: 2018-01-30

## 2018-01-30 NOTE — LETTER
[facility]  [address], [address] [facility] Ervin Braun RN        January 30, 2018    [address]      Dear Jasmin Cordero:    Jan Lindsey, this is the Andrzej Biggs- the care coordinator at the clinic. I met with you while you were in the hospital in October. I have been unable to reach you by phone. I have left several messages requesting a return call. We missed you at your scheduled appointment 1/30/2018 with Dr. Travon Hastings. I am enclosing another letter that further explains care coordination. Could you please call me at [phone] to assist with your health care needs and to reschedule your appointment with Dr. Travon Hastings.        Be Well,       Juany Braun, RN

## 2018-02-05 RX ORDER — TRAZODONE HYDROCHLORIDE 100 MG/1
TABLET ORAL
Qty: 90 TABLET | Refills: 1 | Status: SHIPPED | OUTPATIENT
Start: 2018-02-05 | End: 2018-08-06 | Stop reason: SDUPTHER

## 2018-02-05 RX ORDER — CITALOPRAM 40 MG/1
TABLET ORAL
Qty: 90 TABLET | Refills: 1 | Status: SHIPPED | OUTPATIENT
Start: 2018-02-05 | End: 2018-11-05 | Stop reason: SDUPTHER

## 2018-02-28 ENCOUNTER — CARE COORDINATION (OUTPATIENT)
Dept: CARE COORDINATION | Age: 58
End: 2018-02-28

## 2018-03-02 DIAGNOSIS — I25.119 CORONARY ARTERY DISEASE INVOLVING NATIVE CORONARY ARTERY OF NATIVE HEART WITH ANGINA PECTORIS (HCC): ICD-10-CM

## 2018-03-02 RX ORDER — ATORVASTATIN CALCIUM 40 MG/1
TABLET, FILM COATED ORAL
Qty: 90 TABLET | Refills: 0 | Status: SHIPPED | OUTPATIENT
Start: 2018-03-02 | End: 2018-05-04 | Stop reason: SDUPTHER

## 2018-03-02 RX ORDER — CLOPIDOGREL BISULFATE 75 MG/1
TABLET ORAL
Qty: 90 TABLET | Refills: 0 | Status: SHIPPED | OUTPATIENT
Start: 2018-03-02 | End: 2018-05-08 | Stop reason: SDUPTHER

## 2018-03-08 ENCOUNTER — CARE COORDINATION (OUTPATIENT)
Dept: CARE COORDINATION | Age: 58
End: 2018-03-08

## 2018-03-08 DIAGNOSIS — E78.2 MIXED HYPERLIPIDEMIA: Primary | ICD-10-CM

## 2018-03-08 NOTE — CARE COORDINATION
Ambulatory Care Coordination Note  3/8/2018  CM Risk Score: 9  Zonia Mortality Risk Score:      ACC: Raissa Cuellar RN    Summary Note: Sonia Blake has Type II Diabetes- without use of insulin- uncontrolled, Obesity, HTN, Hyperlipidemia, Depression, JUMA, GERD, CAD.            Lab Results   Component Value Date     LABA1C 9.7 (H) 03/09/2017     LABA1C 6.6 (H) 08/31/2016     LABA1C 7.1 (H) 02/26/2016            Lab Results   Component Value Date      03/09/2017      08/31/2016      02/26/2016      This writer enrolled Sonia Blake in care coordination in 10/2017. She has cancelled or missed follow up appointments with cardiology and PCP. She has no future appointments. This writer has called and left messages requesting return call x 5 and sent letter. No response from Patient.      Unable to reach Patient by phone- (7th reach out). Left message requesting return call. Per chart review- Patient is overdue for appointment with PCP and lab work. 3/8/2018 Called and left message for Patient that I will forward lab orders and she is to schedule appointment with PCP for further refills on her medications. Orders placed for Urine Microalbumin and lipids to be added to Hgb A1C order. Separate encounter to Dr. Amara Cisnerso. This writer will remove from care coordination at this time. Appointment desk is flagged and this writer will watch for appointment with PCP and see Patient at that time. PCP aware. No future appointments.

## 2018-03-08 NOTE — LETTER
12[address] [facility]  [address], [address], RN        March 9, 2018    [address]      Dear Krystyna Camejo:    I have unable to reach you since October when I saw you in the hospital. Our records show that you are overdue for labs and appointments with Dr. Nikolai Soto and cardiology. I am enclosing lab orders. Please call [phone] to schedule these appointment. Dr. Nikolai Soto will need to see you to be able to refill your medications. You may call me at [phone] to assist you with these appointments and to work with you on your health care needs. Be Well.      Sincerely,        Tonya Montero RN

## 2018-04-25 DIAGNOSIS — Z12.31 SCREENING MAMMOGRAM, ENCOUNTER FOR: Primary | ICD-10-CM

## 2018-05-04 ENCOUNTER — HOSPITAL ENCOUNTER (OUTPATIENT)
Dept: LAB | Age: 58
Setting detail: SPECIMEN
Discharge: HOME | End: 2018-05-04
Payer: MEDICARE

## 2018-05-04 DIAGNOSIS — E78.2 MIXED HYPERLIPIDEMIA: ICD-10-CM

## 2018-05-04 DIAGNOSIS — Z13.9 SCREENING FOR CONDITION: ICD-10-CM

## 2018-05-04 DIAGNOSIS — E11.9 TYPE 2 DIABETES MELLITUS WITHOUT COMPLICATION, WITHOUT LONG-TERM CURRENT USE OF INSULIN (HCC): ICD-10-CM

## 2018-05-04 LAB
ANION GAP SERPL CALCULATED.3IONS-SCNC: 9 MMOL/L (ref 9–17)
BUN BLDV-MCNC: 11 MG/DL (ref 6–20)
BUN/CREAT BLD: 13 (ref 9–20)
CALCIUM SERPL-MCNC: 8.8 MG/DL (ref 8.6–10.4)
CHLORIDE BLD-SCNC: 102 MMOL/L (ref 98–107)
CHOLESTEROL/HDL RATIO: 3.1
CHOLESTEROL: 170 MG/DL
CO2: 27 MMOL/L (ref 20–31)
CREAT SERPL-MCNC: 0.85 MG/DL (ref 0.5–0.9)
CREATININE URINE: 95.7 MG/DL (ref 28–217)
ESTIMATED AVERAGE GLUCOSE: 200 MG/DL
GFR AFRICAN AMERICAN: >60 ML/MIN
GFR NON-AFRICAN AMERICAN: >60 ML/MIN
GFR SERPL CREATININE-BSD FRML MDRD: ABNORMAL ML/MIN/{1.73_M2}
GFR SERPL CREATININE-BSD FRML MDRD: ABNORMAL ML/MIN/{1.73_M2}
GLUCOSE BLD-MCNC: 253 MG/DL (ref 70–99)
HBA1C MFR BLD: 8.6 % (ref 4.8–5.9)
HDLC SERPL-MCNC: 54 MG/DL
HEPATITIS C ANTIBODY: NONREACTIVE
LDL CHOLESTEROL: 100 MG/DL (ref 0–130)
MICROALBUMIN/CREAT 24H UR: <12 MG/L
MICROALBUMIN/CREAT UR-RTO: NORMAL MCG/MG CREAT
POTASSIUM SERPL-SCNC: 4 MMOL/L (ref 3.7–5.3)
SODIUM BLD-SCNC: 138 MMOL/L (ref 135–144)
TRIGL SERPL-MCNC: 79 MG/DL
VLDLC SERPL CALC-MCNC: NORMAL MG/DL (ref 1–30)

## 2018-05-04 PROCEDURE — 80048 BASIC METABOLIC PNL TOTAL CA: CPT

## 2018-05-04 PROCEDURE — 36415 COLL VENOUS BLD VENIPUNCTURE: CPT

## 2018-05-04 PROCEDURE — 82570 ASSAY OF URINE CREATININE: CPT

## 2018-05-04 PROCEDURE — 86803 HEPATITIS C AB TEST: CPT

## 2018-05-04 PROCEDURE — 83036 HEMOGLOBIN GLYCOSYLATED A1C: CPT

## 2018-05-04 PROCEDURE — 80061 LIPID PANEL: CPT

## 2018-05-04 PROCEDURE — 82043 UR ALBUMIN QUANTITATIVE: CPT

## 2018-05-07 RX ORDER — METFORMIN HYDROCHLORIDE 500 MG/1
TABLET, EXTENDED RELEASE ORAL
Qty: 60 TABLET | Refills: 0 | Status: SHIPPED | OUTPATIENT
Start: 2018-05-07 | End: 2018-07-05 | Stop reason: SDUPTHER

## 2018-05-07 RX ORDER — ATORVASTATIN CALCIUM 40 MG/1
TABLET, FILM COATED ORAL
Qty: 30 TABLET | Refills: 0 | Status: SHIPPED | OUTPATIENT
Start: 2018-05-07 | End: 2018-05-08 | Stop reason: SDUPTHER

## 2018-05-07 RX ORDER — BUPROPION HYDROCHLORIDE 150 MG/1
TABLET, EXTENDED RELEASE ORAL
Qty: 30 TABLET | Refills: 0 | Status: SHIPPED | OUTPATIENT
Start: 2018-05-07 | End: 2018-07-05 | Stop reason: SDUPTHER

## 2018-05-08 DIAGNOSIS — I25.119 CORONARY ARTERY DISEASE INVOLVING NATIVE CORONARY ARTERY OF NATIVE HEART WITH ANGINA PECTORIS (HCC): ICD-10-CM

## 2018-05-08 RX ORDER — METOPROLOL TARTRATE 100 MG/1
100 TABLET ORAL 2 TIMES DAILY
Qty: 180 TABLET | Refills: 3 | Status: SHIPPED | OUTPATIENT
Start: 2018-05-08 | End: 2018-08-06 | Stop reason: SDUPTHER

## 2018-05-08 RX ORDER — ISOSORBIDE MONONITRATE 30 MG/1
30 TABLET, EXTENDED RELEASE ORAL DAILY
Qty: 90 TABLET | Refills: 3 | Status: SHIPPED | OUTPATIENT
Start: 2018-05-08 | End: 2018-08-16 | Stop reason: ALTCHOICE

## 2018-05-08 RX ORDER — ATORVASTATIN CALCIUM 40 MG/1
TABLET, FILM COATED ORAL
Qty: 90 TABLET | Refills: 3 | Status: SHIPPED | OUTPATIENT
Start: 2018-05-08 | End: 2018-07-05 | Stop reason: SDUPTHER

## 2018-05-08 RX ORDER — AMLODIPINE BESYLATE 10 MG/1
TABLET ORAL
Qty: 90 TABLET | Refills: 3 | Status: SHIPPED | OUTPATIENT
Start: 2018-05-08 | End: 2018-11-05 | Stop reason: SDUPTHER

## 2018-05-08 RX ORDER — RANOLAZINE 500 MG/1
500 TABLET, EXTENDED RELEASE ORAL 2 TIMES DAILY
Qty: 180 TABLET | Refills: 3 | Status: SHIPPED | OUTPATIENT
Start: 2018-05-08 | End: 2018-12-17 | Stop reason: SDUPTHER

## 2018-05-08 RX ORDER — CLOPIDOGREL BISULFATE 75 MG/1
TABLET ORAL
Qty: 90 TABLET | Refills: 3 | Status: SHIPPED | OUTPATIENT
Start: 2018-05-08 | End: 2018-07-05 | Stop reason: SDUPTHER

## 2018-05-08 RX ORDER — LOSARTAN POTASSIUM 100 MG/1
TABLET ORAL
Qty: 90 TABLET | Refills: 3 | Status: SHIPPED | OUTPATIENT
Start: 2018-05-08 | End: 2018-08-06 | Stop reason: SDUPTHER

## 2018-06-06 ENCOUNTER — OFFICE VISIT (OUTPATIENT)
Dept: CARDIOLOGY | Age: 58
End: 2018-06-06
Payer: MEDICARE

## 2018-06-06 ENCOUNTER — CARE COORDINATION (OUTPATIENT)
Dept: FAMILY MEDICINE CLINIC | Age: 58
End: 2018-06-06

## 2018-06-06 ENCOUNTER — OFFICE VISIT (OUTPATIENT)
Dept: FAMILY MEDICINE CLINIC | Age: 58
End: 2018-06-06
Payer: MEDICARE

## 2018-06-06 ENCOUNTER — HOSPITAL ENCOUNTER (OUTPATIENT)
Dept: MAMMOGRAPHY | Age: 58
Discharge: HOME | End: 2018-06-08
Payer: MEDICARE

## 2018-06-06 VITALS
WEIGHT: 175 LBS | SYSTOLIC BLOOD PRESSURE: 124 MMHG | HEIGHT: 61 IN | BODY MASS INDEX: 33.04 KG/M2 | DIASTOLIC BLOOD PRESSURE: 60 MMHG | HEART RATE: 64 BPM

## 2018-06-06 VITALS
HEIGHT: 61 IN | RESPIRATION RATE: 16 BRPM | BODY MASS INDEX: 33.04 KG/M2 | DIASTOLIC BLOOD PRESSURE: 94 MMHG | WEIGHT: 175 LBS | SYSTOLIC BLOOD PRESSURE: 156 MMHG | HEART RATE: 68 BPM

## 2018-06-06 DIAGNOSIS — E78.2 MIXED HYPERLIPIDEMIA: ICD-10-CM

## 2018-06-06 DIAGNOSIS — Z11.4 ENCOUNTER FOR SCREENING FOR HIV: ICD-10-CM

## 2018-06-06 DIAGNOSIS — F33.42 RECURRENT MAJOR DEPRESSIVE DISORDER, IN FULL REMISSION (HCC): ICD-10-CM

## 2018-06-06 DIAGNOSIS — I25.119 CORONARY ARTERY DISEASE INVOLVING NATIVE CORONARY ARTERY OF NATIVE HEART WITH ANGINA PECTORIS (HCC): Primary | ICD-10-CM

## 2018-06-06 DIAGNOSIS — Z12.31 SCREENING MAMMOGRAM, ENCOUNTER FOR: ICD-10-CM

## 2018-06-06 DIAGNOSIS — R91.1 LUNG NODULE: ICD-10-CM

## 2018-06-06 DIAGNOSIS — I10 ESSENTIAL HYPERTENSION: ICD-10-CM

## 2018-06-06 DIAGNOSIS — G47.33 OBSTRUCTIVE SLEEP APNEA: ICD-10-CM

## 2018-06-06 DIAGNOSIS — Z95.5 STENTED CORONARY ARTERY: ICD-10-CM

## 2018-06-06 DIAGNOSIS — E66.09 CLASS 1 OBESITY DUE TO EXCESS CALORIES WITHOUT SERIOUS COMORBIDITY WITH BODY MASS INDEX (BMI) OF 33.0 TO 33.9 IN ADULT: ICD-10-CM

## 2018-06-06 DIAGNOSIS — E11.3299 BACKGROUND DIABETIC RETINOPATHY (HCC): ICD-10-CM

## 2018-06-06 DIAGNOSIS — Z23 NEED FOR SHINGLES VACCINE: ICD-10-CM

## 2018-06-06 DIAGNOSIS — G47.00 INSOMNIA, UNSPECIFIED TYPE: ICD-10-CM

## 2018-06-06 DIAGNOSIS — K21.9 GASTROESOPHAGEAL REFLUX DISEASE WITHOUT ESOPHAGITIS: ICD-10-CM

## 2018-06-06 DIAGNOSIS — E83.42 HYPOMAGNESEMIA: ICD-10-CM

## 2018-06-06 PROCEDURE — 77063 BREAST TOMOSYNTHESIS BI: CPT

## 2018-06-06 PROCEDURE — 99215 OFFICE O/P EST HI 40 MIN: CPT | Performed by: FAMILY MEDICINE

## 2018-06-06 PROCEDURE — 3017F COLORECTAL CA SCREEN DOC REV: CPT | Performed by: FAMILY MEDICINE

## 2018-06-06 PROCEDURE — G8598 ASA/ANTIPLAT THER USED: HCPCS | Performed by: FAMILY MEDICINE

## 2018-06-06 PROCEDURE — G8417 CALC BMI ABV UP PARAM F/U: HCPCS | Performed by: FAMILY MEDICINE

## 2018-06-06 PROCEDURE — 99213 OFFICE O/P EST LOW 20 MIN: CPT | Performed by: INTERNAL MEDICINE

## 2018-06-06 PROCEDURE — G8427 DOCREV CUR MEDS BY ELIG CLIN: HCPCS | Performed by: FAMILY MEDICINE

## 2018-06-06 PROCEDURE — 93000 ELECTROCARDIOGRAM COMPLETE: CPT | Performed by: INTERNAL MEDICINE

## 2018-06-06 PROCEDURE — 2022F DILAT RTA XM EVC RTNOPTHY: CPT | Performed by: FAMILY MEDICINE

## 2018-06-06 PROCEDURE — 1036F TOBACCO NON-USER: CPT | Performed by: FAMILY MEDICINE

## 2018-06-06 PROCEDURE — 3045F PR MOST RECENT HEMOGLOBIN A1C LEVEL 7.0-9.0%: CPT | Performed by: FAMILY MEDICINE

## 2018-06-06 RX ORDER — NITROGLYCERIN 0.4 MG/1
0.4 TABLET SUBLINGUAL EVERY 5 MIN PRN
Qty: 25 TABLET | Refills: 1 | Status: SHIPPED | OUTPATIENT
Start: 2018-06-06 | End: 2019-02-25 | Stop reason: SDUPTHER

## 2018-06-06 RX ORDER — GLIMEPIRIDE 4 MG/1
4 TABLET ORAL 2 TIMES DAILY WITH MEALS
Qty: 180 TABLET | Refills: 3 | Status: SHIPPED | OUTPATIENT
Start: 2018-06-06 | End: 2019-10-10 | Stop reason: SDUPTHER

## 2018-06-06 ASSESSMENT — PATIENT HEALTH QUESTIONNAIRE - PHQ9
1. LITTLE INTEREST OR PLEASURE IN DOING THINGS: 0
SUM OF ALL RESPONSES TO PHQ9 QUESTIONS 1 & 2: 0
2. FEELING DOWN, DEPRESSED OR HOPELESS: 0
SUM OF ALL RESPONSES TO PHQ QUESTIONS 1-9: 0

## 2018-06-06 ASSESSMENT — ENCOUNTER SYMPTOMS
EYES NEGATIVE: 1
ALLERGIC/IMMUNOLOGIC NEGATIVE: 1
SHORTNESS OF BREATH: 0
WHEEZING: 0
GASTROINTESTINAL NEGATIVE: 1

## 2018-06-06 NOTE — PROGRESS NOTES
Today's Date: 2018  Patient's Name: Francine Ivey  Patient's age: 63 y.o., 1960    Subjective: The patient is a 63y.o. year old, , female is in the office for F/U. She continue to complain of atypical musculoskeletal type chest pain that is different from her anginal pain. No orthopnea or PND. She has been complaining of recurrent palpitations with no dizziness or syncope. She had rotator cuff tear on last MRI but does not want surgical intervention. Past Medical History:   has a past medical history of Anxiety; Coronary artery disease; Depression; Diabetes mellitus, type 2 ([address] Examination of participant in clinical trial; Gastroesophageal reflux disease; Hyperlipidemia; Hypertension; Hypomagnesemia; Insomnia; Normal left ventricular systolic function; Obesity; Obstructive sleep apnea; and Sciatic nerve disease. Past Surgical History:   has a past surgical history that includes Hysterectomy (); Cholecystectomy (); Cardiac catheterization (2011); Cardiac catheterization (2008); Cardiac catheterization (2009); fracture surgery (Right, 10/1998); Upper gastrointestinal endoscopy (2005); Cardiac catheterization (2013);  section ();  section (1985); Breast biopsy (Right, 9-15-14); Breast lumpectomy (Right, 10-1-2014); Finger trigger release (Left, 2015); Coronary angioplasty (2009); and Coronary angioplasty (2013). Home Medications:  Prior to Admission medications    Medication Sig Start Date End Date Taking?  Authorizing Provider   linagliptin (TRADJENTA) 5 MG tablet TAKE ONE TABLET BY MOUTH ONCE DAILY 18  Yes Bridget Starkey MD   isosorbide mononitrate (IMDUR) 30 MG extended release tablet Take 1 tablet by mouth daily 18  Yes Srini England MD   metoprolol (LOPRESSOR) 100 MG tablet Take 1 tablet by mouth 2 times daily 18  Yes Srini England MD   losartan (COZAAR) 100 MG tablet TAKE ONE TABLET BY all extremities well  · No abnormalities of mood, affect, memory, mentation, or behavior are noted    Cardiac Data:  Diagnostics:    EKG: normal EKG, normal sinus rhythm, unchanged from previous tracings. Labs:   FASTING LIPID PANEL:  Lab Results   Component Value Date    HDL 54 05/04/2018    TRIG 79 05/04/2018     IMPRESSION:    Patient Active Problem List   Diagnosis    Coronary artery disease involving native coronary artery of native heart with angina pectoris ([facility] [address]    Uncontrolled type 2 diabetes mellitus, without long-term current use of insulin ([facility] [address]    Hyperlipidemia    Hypertension    Anxiety    Obstructive sleep apnea    Major depressive disorder in full remission ([facility] [address]    Gastroesophageal reflux disease    Hypomagnesemia    Obesity    Insomnia    Chest pain    Background diabetic retinopathy ([facility] [address]     Assessment / Acute Cardiac Problems:     1-CAD: with multiple PCIs in the past last one 04/2013: Nuclear stress test 02/2016 with no ischemia or infarction: now with recurrent episodes of chest pain, will add Ranexa if continued will consider cardiac cath. 2-HTN: well controlled. 3-HLP: on statin  4-Preserved LV systolic function. 5-Atypical non-cardiac musculoskeletal chest pain   6-Recurrent palpitations now resolved with event monitor 06/2016 showed NSR with rare short runs of PATs. Plan of Treatment:     1-Continue current medical treatment with ASA, Plavix, BB, ARB, CCB, Nitrates and statin. Add Ranexa 500 mg BID. 2-OK for rotator cuff surgery from cardiac stand point if needed. 3-Aggressive risk factors modifications. 4-Diet, exercise and loose weight.  5-F/U in 3 months.     Electronically signed by Eleazar Segovia MD on 6/6/2018 at 1530 . S. [facility]  [phone]

## 2018-06-06 NOTE — PATIENT INSTRUCTIONS
[place] Information    Go to [email or web address] or open link in email to log in. Username: [email or web address]     Password: [email or web address]    Secret question: Robert       --------------------------------------     Nuclear Stress Test      Please report to the [facility] Cardiopulmonary Testing Department located in the [facility]. Check in at the PHYSICAL THERAPY window. Central Scheduling will call you to schedule this test. If you do not receive a call within 48 hours, please call to schedule at [phone]. Please allow 3 hours to complete this test.     --PLEASE GIVE 24 HOURS NOTICE TO CANCEL/RESCHEDULE--     >>  Nothing to eat or drink for FOUR (4) HOURS prior to arrival time. >>  No caffeine for 24 hours prior to test. This includes decaffeinated beverages,  chocolate, tea and cola drinks. >>  If you are diabetic, check with your Primary Care Provider regarding changes in your insulin or diabetic pills on test day.    >>  Please bring your prescription medications with you on Stress Day. ~  Take regular medications with sips of water. >>  Please let the nurse know if you are claustrophobic. The nuclear camera comes close to your body during pictures. You are not enclosed however.

## 2018-06-11 ENCOUNTER — CARE COORDINATION (OUTPATIENT)
Dept: FAMILY MEDICINE CLINIC | Age: 58
End: 2018-06-11

## 2018-06-11 RX ORDER — LANCETS 30 GAUGE
EACH MISCELLANEOUS
Qty: 200 EACH | Refills: 3 | Status: SHIPPED | OUTPATIENT
Start: 2018-06-11

## 2018-06-11 RX ORDER — GLUCOSAMINE HCL/CHONDROITIN SU 500-400 MG
CAPSULE ORAL
Qty: 200 STRIP | Refills: 3 | Status: SHIPPED | OUTPATIENT
Start: 2018-06-11 | End: 2021-04-14 | Stop reason: SDUPTHER

## 2018-07-02 ENCOUNTER — CARE COORDINATION (OUTPATIENT)
Dept: FAMILY MEDICINE CLINIC | Age: 58
End: 2018-07-02

## 2018-07-05 DIAGNOSIS — I25.119 CORONARY ARTERY DISEASE INVOLVING NATIVE CORONARY ARTERY OF NATIVE HEART WITH ANGINA PECTORIS (HCC): ICD-10-CM

## 2018-07-05 RX ORDER — BUPROPION HYDROCHLORIDE 150 MG/1
TABLET, EXTENDED RELEASE ORAL
Qty: 30 TABLET | Refills: 0 | Status: SHIPPED | OUTPATIENT
Start: 2018-07-05 | End: 2018-08-06 | Stop reason: SDUPTHER

## 2018-07-05 RX ORDER — ATORVASTATIN CALCIUM 40 MG/1
TABLET, FILM COATED ORAL
Qty: 30 TABLET | Refills: 0 | Status: SHIPPED | OUTPATIENT
Start: 2018-07-05 | End: 2018-08-06 | Stop reason: SDUPTHER

## 2018-07-05 RX ORDER — CLOPIDOGREL BISULFATE 75 MG/1
TABLET ORAL
Qty: 30 TABLET | Refills: 0 | Status: SHIPPED | OUTPATIENT
Start: 2018-07-05 | End: 2018-08-06 | Stop reason: SDUPTHER

## 2018-07-05 RX ORDER — METFORMIN HYDROCHLORIDE 500 MG/1
TABLET, EXTENDED RELEASE ORAL
Qty: 60 TABLET | Refills: 0 | Status: SHIPPED | OUTPATIENT
Start: 2018-07-05 | End: 2018-08-06 | Stop reason: SDUPTHER

## 2018-07-10 ENCOUNTER — CARE COORDINATION (OUTPATIENT)
Dept: FAMILY MEDICINE CLINIC | Age: 58
End: 2018-07-10

## 2018-07-10 NOTE — CARE COORDINATION
10:40 AM [facility] ROOM CARLOS ALBERTO NUC MED STV [place]   7/30/2018 10:55 AM SCHEDULE, STRESS [facility] CARDIOLOGY MD STRESS None   7/30/2018 11:55 AM [facility] ROOM CARLOS ALBERTO NUC MED STV [place]   7/30/2018 3:00 PM [facility] CT ROOM MD CT SCAN STV [place]   9/7/2018 11:20 AM [facility]   12/12/2018 1:15 PM Mert Cano MD [facility]

## 2018-07-10 NOTE — LETTER
[facility] [address] [place] [address]  Phone: [phone]  Fax: [phone]    Asher Velarde RN        July 10, 2018    [address]      Dear Alo Brooks:    Sourav Crawford. I hope you are doing well. You were going to  a new glucometer. I was wondering how your Blood Sugars are running. Please call me at [phone]. Be Well.        Sincerely,        Asher Velarde RN

## 2018-07-27 ENCOUNTER — CARE COORDINATION (OUTPATIENT)
Dept: FAMILY MEDICINE CLINIC | Age: 58
End: 2018-07-27

## 2018-07-27 NOTE — CARE COORDINATION
Ambulatory Care Coordination Note  7/27/2018  CM Risk Score: 5  Zonia Mortality Risk Score:      ACC: Filiberto Hyman RN    Summary Note: Jeb Lovett has Type II Diabetes- without use of insulin- uncontrolled, Obesity, HTN, Hyperlipidemia, Depression, JUMA, GERD, CAD. She follows with cardiology.        Plan of Care : Continue with assessment, education and support                                        BS review- should have a glucometer- new.                                         Hypoglycemia f/u                                        F/U on cardiology testing                                        Continue to assist with updating HM     Per chart review- Alejandra rescheduled cardiac testing and CT chest for 7/30/2018.      Called and spoke with mother. Bambi Dupont was not available. She stated that Bambi Dupont was planning on keeping appointment for cardiac testing 7/30/2018. Advised to bring BS log for review. Requested return call. Lab Results   Component Value Date    LABA1C 8.6 (H) 05/04/2018    LABA1C 9.7 (H) 03/09/2017    LABA1C 6.6 (H) 08/31/2016     Lab Results   Component Value Date     05/04/2018     03/09/2017     08/31/2016       Care Coordination Interventions    Program Enrollment:  Rising Risk  Referral from Primary Care Provider:  Yes  Suggested Interventions and Community Resources  Diabetes Education:  Completed (Comment: had diabetes education in the past- will continue to review)  [employer] Management Tools: In Process         Prior to Admission medications    Medication Sig Start Date End Date Taking?  Authorizing Provider   metFORMIN (GLUCOPHAGE-XR) 500 MG extended release tablet TAKE ONE TABLET BY MOUTH TWICE DAILY 7/5/18   Primo Patel MD   buPROPion [facility] SR) 150 MG extended release tablet TAKE ONE TABLET BY MOUTH ONCE DAILY 7/5/18   Primo Patel MD   clopidogrel (PLAVIX) 75 MG tablet TAKE ONE TABLET BY MOUTH ONCE DAILY 7/5/18   Primo Patel MD   atorvastatin

## 2018-07-30 ENCOUNTER — HOSPITAL ENCOUNTER (OUTPATIENT)
Dept: NON INVASIVE DIAGNOSTICS | Age: 58
Discharge: HOME | End: 2018-07-30
Payer: MEDICARE

## 2018-07-30 ENCOUNTER — HOSPITAL ENCOUNTER (OUTPATIENT)
Dept: CT IMAGING | Age: 58
Discharge: HOME | End: 2018-08-01
Payer: MEDICARE

## 2018-07-30 ENCOUNTER — HOSPITAL ENCOUNTER (OUTPATIENT)
Dept: NUCLEAR MEDICINE | Age: 58
End: 2018-07-30
Payer: MEDICARE

## 2018-07-30 ENCOUNTER — HOSPITAL ENCOUNTER (OUTPATIENT)
Dept: NUCLEAR MEDICINE | Age: 58
Discharge: HOME | End: 2018-08-01
Payer: MEDICARE

## 2018-07-30 DIAGNOSIS — R91.1 LUNG NODULE: ICD-10-CM

## 2018-07-30 DIAGNOSIS — I25.119 CORONARY ARTERY DISEASE INVOLVING NATIVE CORONARY ARTERY OF NATIVE HEART WITH ANGINA PECTORIS (HCC): ICD-10-CM

## 2018-07-30 DIAGNOSIS — Z95.5 STENTED CORONARY ARTERY: ICD-10-CM

## 2018-07-30 DIAGNOSIS — E78.2 MIXED HYPERLIPIDEMIA: ICD-10-CM

## 2018-07-30 PROCEDURE — 93018 CV STRESS TEST I&R ONLY: CPT | Performed by: INTERNAL MEDICINE

## 2018-07-30 PROCEDURE — 6360000002 HC RX W HCPCS: Performed by: INTERNAL MEDICINE

## 2018-07-30 PROCEDURE — 3430000000 HC RX DIAGNOSTIC RADIOPHARMACEUTICAL: Performed by: INTERNAL MEDICINE

## 2018-07-30 PROCEDURE — 71250 CT THORAX DX C-: CPT

## 2018-07-30 PROCEDURE — 78452 HT MUSCLE IMAGE SPECT MULT: CPT

## 2018-07-30 PROCEDURE — 93017 CV STRESS TEST TRACING ONLY: CPT

## 2018-07-30 PROCEDURE — A9500 TC99M SESTAMIBI: HCPCS | Performed by: INTERNAL MEDICINE

## 2018-07-30 RX ADMIN — TETRAKIS(2-METHOXYISOBUTYLISOCYANIDE)COPPER(I) TETRAFLUOROBORATE 30 MILLICURIE: 1 INJECTION, POWDER, LYOPHILIZED, FOR SOLUTION INTRAVENOUS at 12:03

## 2018-07-30 RX ADMIN — REGADENOSON 0.4 MG: 0.08 INJECTION, SOLUTION INTRAVENOUS at 11:41

## 2018-07-30 RX ADMIN — TETRAKIS(2-METHOXYISOBUTYLISOCYANIDE)COPPER(I) TETRAFLUOROBORATE 10 MILLICURIE: 1 INJECTION, POWDER, LYOPHILIZED, FOR SOLUTION INTRAVENOUS at 12:02

## 2018-07-31 NOTE — PROCEDURES
[facility] [address]                   [address] [place] [id number]                                CARDIAC STRESS TEST    PATIENT NAME: Christie Zarco                    :        1960  MED REC NO:   [id number]                             ROOM:  ACCOUNT NO:   [de-identified]                           ADMIT DATE: 2018  PROVIDER:     Berny Ackerman DO    DATE OF STUDY:  2018    STRESS TEST    Ordering Physician: Leona Guerrero MD    Primary Care Provider: Carmen Hernandez MD    Patient's Age: 63     Height: 5 feet 1 inches  Weight: 175 pounds    INDICATION:  CAD    Lexiscan 0.4 mg injected over 10 seconds. IV Cardiolite injected 20 seconds post Lexiscan injection. Heart Rate: 61 Resting blood pressure:  185/87              HR   BP  1 min          85   181/89  2 min  3 min          75   175/86  4 min  5 min          79   161/81  6 min  7 min          72   161/81  8 min  9 min          71   153/78  10 min    Symptoms:  Chest Pain: Yes  Nausea: Yes  Headache:  Yes  Shortness of breath: Yes  Other: No    Resting EKG:  Normal    Arrhythmias: None    EKG changes:  None    INTERPRETATION:  1. No ECG changes to suggest ischemia. 2. Await nuclear portion. Nuclear Myocardial Perfusion Imaging (SPECT)    TESTING METHOD  STRESS:   Lexiscan  AGENT:    Cardiolite  REST:     Injection Date:  2018  Time:  1020  Amount:  11.1 mCi  STRESS:   Injection Date:  2018  Time:  1140  Amount:  33.7 mCi  IMAGE TIME:    Rest:  1120  Stress:  1220    EF:  62%  TID:  0.83  LHR:  0.69    FINDINGS:  Ischemia (Reversible Defect): Yes  Infarction (Irreversible Defect):  No  Ejection fraction Calculated: Normal, 62%  Segmental wall motion:   Normal  Diastolic LV Compliance (Stiffness): Abnormal study. IMPRESSION:  1. Moderate sized reversible basal/mid lateral wall reversible defect of  moderate intensity. 2. No infarct. 3. Normal wall motion, EF 62%.         ANTONIO BROCK
TAKE ONE TABLET BY MOUTH ONCE DAILY, Disp: 90 tablet, Rfl: 3    amLODIPine (NORVASC) 10 MG tablet, TAKE ONE TABLET BY MOUTH ONCE DAILY, Disp: 90 tablet, Rfl: 3    ranolazine (RANEXA) 500 MG extended release tablet, Take 1 tablet by mouth 2 times daily, Disp: 180 tablet, Rfl: 3    traZODone (DESYREL) 100 MG tablet, TAKE ONE TABLET BY MOUTH NIGHTLY, Disp: 90 tablet, Rfl: 1    citalopram (CELEXA) 40 MG tablet, TAKE ONE TABLET BY MOUTH ONCE DAILY, Disp: 90 tablet, Rfl: 1    Blood Glucose Monitoring Suppl Supplies MISC, 1 Glucometer, test strips x 100 with 5 refills, 1 lancet device. Patient to test BID and prn., Disp: 100 each, Rfl: 5    LORazepam (ATIVAN) 0.5 MG tablet, TAKE ONE TABLET BY MOUTH AT BEDTIME AS NEEDED, Disp: 30 tablet, Rfl: 0    aspirin 81 MG tablet, Take 81 mg by mouth daily. , Disp: , Rfl:     Current Facility-Administered Medications:     regadenoson (LEXISCAN) injection 0.4 mg, 0.4 mg, Intravenous, Once, Wilton Joshi DO    Facility-Administered Medications Ordered in Other Encounters:     technetium sestamibi (CARDIOLITE) injection 10 millicurie, 10 millicurie, Intravenous, ONCE PRN, Luiza Fernandez MD    technetium sestamibi (CARDIOLITE) injection 30 millicurie, 30 millicurie, Intravenous, ONCE PRN, Luiza Fernandez MD      ----------------------------------------------------------------------------------------------------------                Lexiscan 0.4 mg injected over 10 seconds. IV Cardiolite injected 20 seconds post Lexiscan injection.      Heart Rate:  61  Resting Blood Pressure:  185/87   ----------------------------------------------------------------------------------------------------------     HR BP   1 min 85 181/89   2 min     3 min 75 176/86   4 min     5 min 79 161/81   6 min     7 min 72 161/81   8 min     9 min 71 153/78   10 min       Symptoms:  Chest Pain:  Yes      Nausea:  Yes     Headache:  Yes    Shortness of Breath:  Yes     Other:  No      Electronically signed by

## 2018-08-01 ENCOUNTER — TELEPHONE (OUTPATIENT)
Dept: CARDIOLOGY | Age: 58
End: 2018-08-01

## 2018-08-01 DIAGNOSIS — I20.8 ANGINA AT REST (HCC): ICD-10-CM

## 2018-08-01 DIAGNOSIS — R93.1 ABNORMAL NUCLEAR CARDIAC IMAGING TEST: Primary | ICD-10-CM

## 2018-08-02 ENCOUNTER — CARE COORDINATION (OUTPATIENT)
Dept: FAMILY MEDICINE CLINIC | Age: 58
End: 2018-08-02

## 2018-08-02 NOTE — LETTER
[facility] [address] [place] [address]  Phone: [phone]  Fax: [phone]    Arleth Acevedo RN        August 2, 2018    [place] [id number]      Dear Peter Stafford:    I have been unable to reach you by phone. I am enclosing BS log sheet with target ranges, Complications of Diabetes, and Plate Method of Eating. Please call me at [phone] to review and continue care coordination.      Be well,        Arleth Acevedo RN

## 2018-08-06 DIAGNOSIS — I25.119 CORONARY ARTERY DISEASE INVOLVING NATIVE CORONARY ARTERY OF NATIVE HEART WITH ANGINA PECTORIS (HCC): ICD-10-CM

## 2018-08-06 RX ORDER — METFORMIN HYDROCHLORIDE 500 MG/1
TABLET, EXTENDED RELEASE ORAL
Qty: 60 TABLET | Refills: 0 | Status: SHIPPED | OUTPATIENT
Start: 2018-08-06 | End: 2018-08-21 | Stop reason: SDUPTHER

## 2018-08-06 RX ORDER — TRAZODONE HYDROCHLORIDE 100 MG/1
TABLET ORAL
Qty: 90 TABLET | Refills: 1 | Status: SHIPPED | OUTPATIENT
Start: 2018-08-06 | End: 2019-02-01 | Stop reason: SDUPTHER

## 2018-08-06 RX ORDER — BUPROPION HYDROCHLORIDE 150 MG/1
TABLET, EXTENDED RELEASE ORAL
Qty: 30 TABLET | Refills: 0 | Status: SHIPPED | OUTPATIENT
Start: 2018-08-06 | End: 2018-10-03 | Stop reason: SDUPTHER

## 2018-08-06 RX ORDER — LOSARTAN POTASSIUM 100 MG/1
TABLET ORAL
Qty: 90 TABLET | Refills: 3 | Status: SHIPPED | OUTPATIENT
Start: 2018-08-06 | End: 2019-10-10 | Stop reason: SDUPTHER

## 2018-08-06 RX ORDER — ATORVASTATIN CALCIUM 40 MG/1
TABLET, FILM COATED ORAL
Qty: 30 TABLET | Refills: 0 | Status: SHIPPED | OUTPATIENT
Start: 2018-08-06 | End: 2018-08-21 | Stop reason: SDUPTHER

## 2018-08-06 RX ORDER — METOPROLOL TARTRATE 100 MG/1
100 TABLET ORAL 2 TIMES DAILY
Qty: 180 TABLET | Refills: 3 | Status: SHIPPED | OUTPATIENT
Start: 2018-08-06 | End: 2019-10-10 | Stop reason: SDUPTHER

## 2018-08-06 RX ORDER — CLOPIDOGREL BISULFATE 75 MG/1
TABLET ORAL
Qty: 30 TABLET | Refills: 0 | Status: SHIPPED | OUTPATIENT
Start: 2018-08-06 | End: 2019-02-25 | Stop reason: SDUPTHER

## 2018-08-16 ENCOUNTER — HOSPITAL ENCOUNTER (OUTPATIENT)
Dept: CARDIAC CATH/INVASIVE PROCEDURES | Age: 58
Discharge: HOME | End: 2018-08-16
Payer: MEDICARE

## 2018-08-16 VITALS
TEMPERATURE: 98.8 F | HEART RATE: 62 BPM | RESPIRATION RATE: 16 BRPM | OXYGEN SATURATION: 98 % | HEIGHT: 61 IN | BODY MASS INDEX: 33.04 KG/M2 | WEIGHT: 175 LBS | SYSTOLIC BLOOD PRESSURE: 148 MMHG | DIASTOLIC BLOOD PRESSURE: 69 MMHG

## 2018-08-16 LAB
GFR NON-AFRICAN AMERICAN: 49 ML/MIN
GFR SERPL CREATININE-BSD FRML MDRD: 59 ML/MIN
GFR SERPL CREATININE-BSD FRML MDRD: ABNORMAL ML/MIN/{1.73_M2}
GLUCOSE BLD-MCNC: 183 MG/DL (ref 74–100)
PLATELET # BLD: 277 K/UL (ref 138–453)
POC CHLORIDE: 102 MMOL/L (ref 98–107)
POC CREATININE: 1.15 MG/DL (ref 0.51–1.19)
POC HEMATOCRIT: 38 % (ref 36–46)
POC HEMOGLOBIN: 13 G/DL (ref 12–16)
POC POTASSIUM: 4.4 MMOL/L (ref 3.5–4.5)
POC SODIUM: 139 MMOL/L (ref 138–146)

## 2018-08-16 PROCEDURE — C1769 GUIDE WIRE: HCPCS

## 2018-08-16 PROCEDURE — 2709999900 HC NON-CHARGEABLE SUPPLY

## 2018-08-16 PROCEDURE — 84132 ASSAY OF SERUM POTASSIUM: CPT

## 2018-08-16 PROCEDURE — 84295 ASSAY OF SERUM SODIUM: CPT

## 2018-08-16 PROCEDURE — 82565 ASSAY OF CREATININE: CPT

## 2018-08-16 PROCEDURE — 6360000002 HC RX W HCPCS

## 2018-08-16 PROCEDURE — 93458 L HRT ARTERY/VENTRICLE ANGIO: CPT | Performed by: INTERNAL MEDICINE

## 2018-08-16 PROCEDURE — 85049 AUTOMATED PLATELET COUNT: CPT

## 2018-08-16 PROCEDURE — 82947 ASSAY GLUCOSE BLOOD QUANT: CPT

## 2018-08-16 PROCEDURE — C1760 CLOSURE DEV, VASC: HCPCS

## 2018-08-16 PROCEDURE — C1894 INTRO/SHEATH, NON-LASER: HCPCS

## 2018-08-16 PROCEDURE — 85014 HEMATOCRIT: CPT

## 2018-08-16 PROCEDURE — 82435 ASSAY OF BLOOD CHLORIDE: CPT

## 2018-08-16 PROCEDURE — 7100000011 HC PHASE II RECOVERY - ADDTL 15 MIN

## 2018-08-16 PROCEDURE — 7100000010 HC PHASE II RECOVERY - FIRST 15 MIN

## 2018-08-16 PROCEDURE — 6360000004 HC RX CONTRAST MEDICATION

## 2018-08-16 RX ORDER — ACETAMINOPHEN 325 MG/1
650 TABLET ORAL EVERY 4 HOURS PRN
Status: CANCELLED | OUTPATIENT
Start: 2018-08-16

## 2018-08-16 RX ORDER — SODIUM CHLORIDE 9 MG/ML
INJECTION, SOLUTION INTRAVENOUS CONTINUOUS
Status: DISCONTINUED | OUTPATIENT
Start: 2018-08-16 | End: 2018-08-17 | Stop reason: HOSPADM

## 2018-08-16 RX ORDER — ONDANSETRON 2 MG/ML
4 INJECTION INTRAMUSCULAR; INTRAVENOUS EVERY 6 HOURS PRN
Status: CANCELLED | OUTPATIENT
Start: 2018-08-16

## 2018-08-16 RX ORDER — SODIUM CHLORIDE 0.9 % (FLUSH) 0.9 %
10 SYRINGE (ML) INJECTION EVERY 12 HOURS SCHEDULED
Status: CANCELLED | OUTPATIENT
Start: 2018-08-16

## 2018-08-16 RX ORDER — SODIUM CHLORIDE 0.9 % (FLUSH) 0.9 %
10 SYRINGE (ML) INJECTION PRN
Status: CANCELLED | OUTPATIENT
Start: 2018-08-16

## 2018-08-16 RX ORDER — ISOSORBIDE MONONITRATE 30 MG/1
30 TABLET, EXTENDED RELEASE ORAL DAILY
COMMUNITY
End: 2019-02-25 | Stop reason: SDUPTHER

## 2018-08-16 NOTE — OP NOTE
Procedure performed by me.     Electronically signed by Ernest Dakins, MD on 8/20/2018 at 4:41 PM.    [facility]      [phone]

## 2018-08-16 NOTE — H&P
[facility]   H&P               Today's Date: 2018  Patient Name: Darlene Melo  Date of admission: 2018  2:29 PM  Patient's age: 63 y.o., 1960  Admission Dx: No admission diagnoses are documented for this encounter. Requesting Physician: No admitting provider for patient encounter. CHIEF COMPLAINT:  Admission for cardiac cath    History Obtained From:  patient, electronic medical record    HISTORY OF PRESENT ILLNESS:    Darlene Melo 63 y.o. female with medical h/o CAD as mentioned below. She has been experiencing chest pain more localized to L side which may or may not be related to exertion but mostly worse with exertion. Her symptoms have been ongoing for past few months. She was seen in clinic 2018 with these symptoms at that time she was started on ranexa but her symptoms did not improve so was recommended to undergo stress test which was positive with results as below. Past Medical History:   has a past medical history of Anxiety; Coronary artery disease; Depression; Diabetes mellitus, type 2 ([facility] [address] Examination of participant in clinical trial; Gastroesophageal reflux disease; Hyperlipidemia; Hypertension; Hypomagnesemia; Insomnia; Normal left ventricular systolic function; Obesity; Obstructive sleep apnea; and Sciatic nerve disease. Past Surgical History:   has a past surgical history that includes Hysterectomy (); Cardiac catheterization (2011); Cardiac catheterization (2008); Cardiac catheterization (2009); fracture surgery (Right, 10/1998); Upper gastrointestinal endoscopy (2005); Cardiac catheterization (2013);  section ();  section (1985); Finger trigger release (Left, 2015); Coronary angioplasty (2009); Coronary angioplasty (2013); Breast biopsy (Right, 9-15-14); Breast lumpectomy (Right, 10-1-2014); and Cholecystectomy ().      Home Medications:    Prior to Admission medications    Medication Sig Start Date End Date Taking? Authorizing Provider   isosorbide mononitrate (IMDUR) 30 MG extended release tablet Take 30 mg by mouth daily   Yes Historical Provider, MD   losartan (COZAAR) 100 MG tablet TAKE ONE TABLET BY MOUTH ONCE DAILY 8/6/18  Yes Gogo Gutierrez MD   metoprolol (LOPRESSOR) 100 MG tablet Take 1 tablet by mouth 2 times daily 8/6/18  Yes Gogo Gutierrez MD   traZODone (DESYREL) 100 MG tablet TAKE ONE TABLET BY MOUTH NIGHTLY 8/6/18  Yes Gogo Gutierrez MD   [facility]ion [facility]) 150 MG extended release tablet TAKE 1 TABLET BY MOUTH ONCE DAILY 8/6/18  Yes Gogo Gutierrez MD   metFORMIN (GLUCOPHAGE-XR) 500 MG extended release tablet TAKE 1 TABLET BY MOUTH TWICE DAILY 8/6/18  Yes Gogo Gutierrez MD   clopidogrel (PLAVIX) 75 MG tablet TAKE 1 TABLET BY MOUTH ONCE DAILY 8/6/18  Yes Gogo Gutierrez MD   atorvastatin (LIPITOR) 40 MG tablet TAKE 1 TABLET BY MOUTH ONCE DAILY 8/6/18  Yes Gogo Gutierrez MD   glimepiride (AMARYL) 4 MG tablet Take 1 tablet by mouth 2 times daily (with meals) 6/6/18  Yes Gogo Gutierrez MD   linagliptin (TRADJENTA) 5 MG tablet TAKE ONE TABLET BY MOUTH ONCE DAILY 5/29/18  Yes Gogo Gutierrez MD   amLODIPine (NORVASC) 10 MG tablet TAKE ONE TABLET BY MOUTH ONCE DAILY 5/8/18  Yes Jordin Paige MD   ranolazine (RANEXA) 500 MG extended release tablet Take 1 tablet by mouth 2 times daily 5/8/18  Yes Jordin Paige MD   citalopram (CELEXA) 40 MG tablet TAKE ONE TABLET BY MOUTH ONCE DAILY 2/5/18  Yes Gogo Gutierrez MD   LORazepam (ATIVAN) 0.5 MG tablet TAKE ONE TABLET BY MOUTH AT BEDTIME AS NEEDED 5/19/17  Yes Sonny Lopez DO   aspirin 81 MG tablet Take 81 mg by mouth daily. Yes Historical Provider, MD   Blood Glucose Monitoring Suppl MICHELLE One glucometer to test 2 times a day and prn Type II diabetes uncontrolled not on insulin 6/11/18   Gogo Gutierrez MD   Glucose Blood (BLOOD GLUCOSE TEST STRIPS) STRP Test 2 times a day and prn.  Uncontrolled type 2 mentation, behavior. · Psychiatric: No anxiety, or depression. · Endocrine: No temperature intolerance. No excessive thirst, fluid intake, or urination. No tremor. · Hematologic/Lymphatic: No abnormal bruising or bleeding, blood clots or swollen lymph nodes. · Allergic/Immunologic: No nasal congestion or hives. PHYSICAL EXAM:      BP (!) 148/70   Pulse 68   Temp 98.8 °F (37.1 °C) (Oral)   Resp 18   Ht 5' 1\" (1.549 m)   Wt 175 lb (79.4 kg)   LMP  (LMP Unknown)   SpO2 98%   BMI 33.07 kg/m²    Constitutional and General Appearance: alert, cooperative, no distress and appears stated age  HEENT: PERRL, no cervical lymphadenopathy. No masses palpable. Normal oral mucosa  Respiratory:  · Normal excursion and expansion without use of accessory muscles  · Resp Auscultation: Good respiratory effort. No for increased work of breathing. On auscultation: clear to auscultation bilaterally  Cardiovascular:  · The apical impulse is not displaced  · Heart tones are crisp and normal. regular S1 and S2.  · Jugular venous pulsation Normal  · The carotid upstroke is normal in amplitude and contour without delay or bruit  · Peripheral pulses are symmetrical and full   Abdomen:   · No masses or tenderness  · Bowel sounds present  Extremities:  ·  No Cyanosis or Clubbing  ·  Lower extremity edema: No  ·  Skin: Warm and dry  Neurological:  · Alert and oriented. · Moves all extremities well  · No abnormalities of mood, affect, memory, mentation, or behavior are noted    DATA:    Diagnostics:        ECHO: 10/10/2017  1. Normal LV size and function with ejection fraction 60% to 65%. 2.  Grade 1 diastolic dysfunction with evidence of elevated left-sided  filling pressures. 3.  Left atrial enlargement. 4.  No significant valvulopathies. 5.  Trivial mitral regurgitation and tricuspid regurgitation. 6.  No effusion. Stress Test:   07/30/2018  1.  Moderate sized reversible basal/mid lateral wall reversible defect of moderate intensity. 2. No infarct. 3. Normal wall motion, EF 62%. Cardiac Angiography:   12/2014  normal left main. Patent LAD stent , with partial Jailed D-1 , D2   Patent Ramus stent . RCA minimal irregularities . PDA 50-60 % small artery. LCX , minimal irregularities. LVEF 55%. Labs:     BMP:   Recent Labs      08/16/18   1503   CREATININE  1.15   LABGLOM  49*     FASTING LIPID PANEL:  Lab Results   Component Value Date    HDL 54 05/04/2018    TRIG 79 05/04/2018     IMPRESSION:    1-CAD: with multiple PCIs in the past last one 04/2013: Nuclear stress test 07/2018- showed moderate reversible basal and lateral wall defect of moderate intensity  2-HTN: well controlled. 3-HLP: on statin  4-Preserved LV systolic function. 5-Recurrent palpitations now resolved with event monitor 06/2016 showed NSR with rare short runs of PATs    Patient Active Problem List   Diagnosis    Coronary artery disease involving native coronary artery of native heart with angina pectoris ([address]    Uncontrolled type 2 diabetes mellitus, without long-term current use of insulin ([address]    Hyperlipidemia    Hypertension    Anxiety    Obstructive sleep apnea    Major depressive disorder in full remission ([address]    Gastroesophageal reflux disease    Hypomagnesemia    Obesity    Insomnia    Chest pain    Background diabetic retinopathy ([address]       RECOMMENDATIONS:  1. Proceed with Cardiac cath after discussion with Dr. Hillary Boss    Will discuss with rounding attending  for final recommendations. Kwabena Johnson MD  Cardiology Fellow    Attending Physician Statement  I have discussed the case of Leslie Boyd including pertinent history and exam findings with the resident. I have seen and examined the patient and the key elements of the encounter have been performed by me. I agree with the assessment, plan and orders as documented by the resident With changes made to the note.      Electronically signed by Scar Quezada

## 2018-08-17 NOTE — PROGRESS NOTES
All discharge instructions reviewed, questions answered, paper signed and given copy. Patient discharged per wheelchair with daughter and belongings.

## 2018-08-21 RX ORDER — METFORMIN HYDROCHLORIDE 500 MG/1
TABLET, EXTENDED RELEASE ORAL
Qty: 60 TABLET | Refills: 0 | Status: CANCELLED | OUTPATIENT
Start: 2018-08-21 | End: 2018-11-19

## 2018-08-22 ENCOUNTER — CARE COORDINATION (OUTPATIENT)
Dept: FAMILY MEDICINE CLINIC | Age: 58
End: 2018-08-22

## 2018-08-22 NOTE — CARE COORDINATION
Ambulatory Care Coordination Note  8/22/2018  CM Risk Score: 5  Zonia Mortality Risk Score:      ACC: Chema Merino RN    Summary Note: Aj Alegria has Type II Diabetes- without use of insulin- uncontrolled, Obesity, HTN, Hyperlipidemia, Depression, JUMA, GERD, CAD. She follows with cardiology. Cardiac cath 8/16/2018.     Plan of Care : Continue with assessment, education and support                           BS review and labs 9/12/2018         [facility] scheduled appt for diabetic eye exam                           Hypoglycemia f/u- avoid skipping meals                           Continue to assist with updating HM    Spoke with [facility]. She stated she had cardiac cath 8/16/2018. She stated she is testing BS only once a day. She stated at varying times. She stated . We reviewed target rages and best times to monitor BS. She is aware to bring BS log to appointment with PCP. She voiced understanding. She had one episode of hypoglycemia. She stated she had been more active and forgot to eat. She did receive new meter and has no issues with it. She requested appointment with PCP be changed to same day of cardiology appointment since she will be coming from her mother's home in [place] (she is caregiver). Appointment changed. She is aware that she needs labs prior to seeing PCP. Discussed need for diabetic eye exam. She was willing to schedule- transferred to schedule. General Assessment    Do you have any symptoms that are causing concern?:  No       Diabetes Assessment    Medic Alert ID:  No  Meal Planning:  Avoidance of concentrated sweets   How often do you test your blood sugar?:  No Testing   Do you have barriers with adherence to non-pharmacologic self-management interventions?  (Nutrition/Exercise/Self-Monitoring):  Yes   Have you ever had to go to the ED for symptoms of low blood sugar?:  Yes       No patient-reported symptoms          Care Coordination Interventions    Program Enrollment:  Rising

## 2018-08-23 RX ORDER — METFORMIN HYDROCHLORIDE 500 MG/1
TABLET, EXTENDED RELEASE ORAL
Qty: 180 TABLET | Refills: 0 | Status: SHIPPED | OUTPATIENT
Start: 2018-08-23 | End: 2019-02-25 | Stop reason: SDUPTHER

## 2018-08-23 RX ORDER — ATORVASTATIN CALCIUM 40 MG/1
TABLET, FILM COATED ORAL
Qty: 90 TABLET | Refills: 0 | Status: SHIPPED | OUTPATIENT
Start: 2018-08-23 | End: 2019-02-01 | Stop reason: SDUPTHER

## 2018-09-12 ENCOUNTER — CARE COORDINATION (OUTPATIENT)
Dept: FAMILY MEDICINE CLINIC | Age: 58
End: 2018-09-12

## 2018-09-12 NOTE — CARE COORDINATION
extended release tablet TAKE 1 TABLET BY MOUTH ONCE DAILY 8/6/18   Letty Maza MD   clopidogrel (PLAVIX) 75 MG tablet TAKE 1 TABLET BY MOUTH ONCE DAILY 8/6/18   Letty Maza MD   Blood Glucose Monitoring Suppl MICHELLE One glucometer to test 2 times a day and prn Type II diabetes uncontrolled not on insulin 6/11/18   Letty Maza MD   Glucose Blood (BLOOD GLUCOSE TEST STRIPS) STRP Test 2 times a day and prn. Uncontrolled type 2 diabetes not on insulin. 6/11/18   Letty Maza MD   Lancets [facility] Lancet device and lancets. Test 2 times an day and prn. Type II Diabetes uncontrolled not on insulin 6/11/18   Letty Maza MD   nitroGLYCERIN (NITROSTAT) 0.4 MG SL tablet Place 1 tablet under the tongue every 5 minutes as needed for Chest pain 6/6/18   Letty Maza MD   Blood Glucose Monitoring Suppl MICHELLE 1 Device by Does not apply route 2 times daily 6/6/18   Letty Maza MD   glimepiride (AMARYL) 4 MG tablet Take 1 tablet by mouth 2 times daily (with meals) 6/6/18   Letty Maza MD   linagliptin (TRADJENTA) 5 MG tablet TAKE ONE TABLET BY MOUTH ONCE DAILY 5/29/18   Letty Maza MD   amLODIPine (NORVASC) 10 MG tablet TAKE ONE TABLET BY MOUTH ONCE DAILY 5/8/18   Toby Echeverria MD   ranolazine (RANEXA) 500 MG extended release tablet Take 1 tablet by mouth 2 times daily 5/8/18   Toby Echeverria MD   citalopram (CELEXA) 40 MG tablet TAKE ONE TABLET BY MOUTH ONCE DAILY 2/5/18   Letty Maza MD   Blood Glucose Monitoring Suppl Supplies MISC 1 Glucometer, test strips x 100 with 5 refills, 1 lancet device. Patient to test BID and prn. 7/3/17 7/4/18  Letty Maza MD   LORazepam (ATIVAN) 0.5 MG tablet TAKE ONE TABLET BY MOUTH AT BEDTIME AS NEEDED 5/19/17   Ashtyn Germain DO   aspirin 81 MG tablet Take 81 mg by mouth daily.     Historical Provider, MD       Future Appointments  Date Time Provider Hank Avalos   9/13/2018 10:30 AM MD Courtney Xiao [facility]   10/24/2018 1:15 PM Aly Farfan MD [facility]

## 2018-09-27 ENCOUNTER — CARE COORDINATION (OUTPATIENT)
Dept: FAMILY MEDICINE CLINIC | Age: 58
End: 2018-09-27

## 2018-09-27 NOTE — CARE COORDINATION
daily.    Historical Provider, MD       Future Appointments  Date Time Provider Hank Avalos   10/24/2018 1:15 PM [facility] Siemens[facility]   10/24/2018 2:20 PM [facility]

## 2018-10-04 RX ORDER — BUPROPION HYDROCHLORIDE 150 MG/1
TABLET, EXTENDED RELEASE ORAL
Qty: 30 TABLET | Refills: 0 | Status: SHIPPED | OUTPATIENT
Start: 2018-10-04 | End: 2018-11-03 | Stop reason: SDUPTHER

## 2018-10-24 ENCOUNTER — CARE COORDINATION (OUTPATIENT)
Dept: CARE COORDINATION | Age: 58
End: 2018-10-24

## 2018-11-05 RX ORDER — BUPROPION HYDROCHLORIDE 150 MG/1
TABLET, EXTENDED RELEASE ORAL
Qty: 30 TABLET | Refills: 0 | Status: SHIPPED | OUTPATIENT
Start: 2018-11-05 | End: 2018-12-17 | Stop reason: SDUPTHER

## 2018-11-05 RX ORDER — CITALOPRAM 40 MG/1
TABLET ORAL
Qty: 30 TABLET | Refills: 0 | Status: SHIPPED | OUTPATIENT
Start: 2018-11-05 | End: 2018-12-17 | Stop reason: SDUPTHER

## 2018-11-05 RX ORDER — AMLODIPINE BESYLATE 10 MG/1
TABLET ORAL
Qty: 30 TABLET | Refills: 0 | Status: SHIPPED | OUTPATIENT
Start: 2018-11-05 | End: 2019-02-01 | Stop reason: SDUPTHER

## 2018-11-05 RX ORDER — LINAGLIPTIN 5 MG/1
TABLET, FILM COATED ORAL
Qty: 30 TABLET | Refills: 0 | Status: SHIPPED | OUTPATIENT
Start: 2018-11-05 | End: 2019-02-01 | Stop reason: SDUPTHER

## 2018-11-13 ENCOUNTER — CARE COORDINATION (OUTPATIENT)
Dept: CARE COORDINATION | Age: 58
End: 2018-11-13

## 2018-11-13 NOTE — CARE COORDINATION
barriers with adherence to non-pharmacologic self-management interventions? (Nutrition/Exercise/Self-Monitoring):  Yes   Have you ever had to go to the ED for symptoms of low blood sugar?:  Yes       No patient-reported symptoms   Blood Sugar Trends:  Fluctuating          Care Coordination Interventions    Program Enrollment:  Complex Care  Referral from Primary Care Provider:  Yes  Suggested Interventions and Community Resources  Diabetes Education:  Completed (Comment: had diabetes education in the past- will continue to review)  Zone Management Tools: In Process         Goals Addressed             Most Recent       Patient Stated     Patient Stated (pt-stated)   No change (11/13/2018)             [facility] stated she would follow up with stress testing and cardiology as directed    Barriers: lack of education  Plan for overcoming my barriers: Care Coordination Intervention  Confidence: 8/10  Anticipated Goal Completion Date: 2/13/2019    [facility] had cardiac cath 8/16/2018 and is scheduled for cardiology f/u 9/12/2018. Alejandra rescheduled cardiology appointment. Next cardiology appointment 10/24/2018  10/24/2018- [facility] missed appointment. 11/13/2018 Alejandra stated she would call and reschedule- she was traveling when this writer spoke to her.  Self Monitoring (pt-stated)   No change (11/13/2018)             Self-Monitored Blood Glucose - I will check my blood sugar Other: Testing 1-2 times a day at varying times  I will notify my provider of any trends of increasing or decreasing blood sugars over a 1 month period. I will notify my provider if I have any blood sugar readings less than 70 more than 2 times a month. None Recently Recorded    Barriers: lack of education  Plan for overcoming my barriers: Care Coordination Intervention  Confidence: 6/10  Anticipated Goal Completion Date: 2/13/2019    [facility] stated she would get glucometer and test 1-2 times a day and prn.  She stated she will call this writer

## 2018-11-24 ENCOUNTER — CARE COORDINATION (OUTPATIENT)
Dept: CARE COORDINATION | Age: 58
End: 2018-11-24

## 2018-12-17 RX ORDER — CITALOPRAM 40 MG/1
TABLET ORAL
Qty: 30 TABLET | Refills: 0 | Status: SHIPPED | OUTPATIENT
Start: 2018-12-17 | End: 2019-02-01 | Stop reason: SDUPTHER

## 2018-12-17 RX ORDER — RANOLAZINE 500 MG/1
500 TABLET, EXTENDED RELEASE ORAL 2 TIMES DAILY
Qty: 180 TABLET | Refills: 3 | Status: SHIPPED | OUTPATIENT
Start: 2018-12-17 | End: 2020-03-03

## 2018-12-17 RX ORDER — BUPROPION HYDROCHLORIDE 150 MG/1
TABLET, EXTENDED RELEASE ORAL
Qty: 30 TABLET | Refills: 0 | Status: SHIPPED | OUTPATIENT
Start: 2018-12-17 | End: 2019-02-01 | Stop reason: SDUPTHER

## 2018-12-28 ENCOUNTER — CARE COORDINATION (OUTPATIENT)
Dept: CARE COORDINATION | Age: 58
End: 2018-12-28

## 2018-12-28 NOTE — CARE COORDINATION
Does not apply route 2 times daily 6/6/18   Valentin Trent MD   glimepiride (AMARYL) 4 MG tablet Take 1 tablet by mouth 2 times daily (with meals) 6/6/18   Valentin Trent MD   Blood Glucose Monitoring Suppl Supplies MISC 1 Glucometer, test strips x 100 with 5 refills, 1 lancet device. Patient to test BID and prn. 7/3/17 7/4/18  Valentin Trent MD   LORazepam (ATIVAN) 0.5 MG tablet TAKE ONE TABLET BY MOUTH AT BEDTIME AS NEEDED 5/19/17   Mikhail Robbins DO   aspirin 81 MG tablet Take 81 mg by mouth daily. Historical Provider, MD       No future appointments.

## 2019-01-21 ENCOUNTER — CARE COORDINATION (OUTPATIENT)
Dept: CARE COORDINATION | Age: 59
End: 2019-01-21

## 2019-02-01 RX ORDER — TRAZODONE HYDROCHLORIDE 100 MG/1
TABLET ORAL
Qty: 90 TABLET | Refills: 1 | Status: SHIPPED | OUTPATIENT
Start: 2019-02-01 | End: 2019-02-25 | Stop reason: SDUPTHER

## 2019-02-01 RX ORDER — BUPROPION HYDROCHLORIDE 150 MG/1
TABLET, EXTENDED RELEASE ORAL
Qty: 30 TABLET | Refills: 0 | Status: SHIPPED | OUTPATIENT
Start: 2019-02-01 | End: 2019-02-25 | Stop reason: SDUPTHER

## 2019-02-01 RX ORDER — ATORVASTATIN CALCIUM 40 MG/1
TABLET, FILM COATED ORAL
Qty: 90 TABLET | Refills: 0 | Status: SHIPPED | OUTPATIENT
Start: 2019-02-01 | End: 2019-02-25 | Stop reason: SDUPTHER

## 2019-02-01 RX ORDER — LINAGLIPTIN 5 MG/1
TABLET, FILM COATED ORAL
Qty: 30 TABLET | Refills: 0 | Status: SHIPPED | OUTPATIENT
Start: 2019-02-01 | End: 2019-02-25 | Stop reason: SDUPTHER

## 2019-02-01 RX ORDER — AMLODIPINE BESYLATE 10 MG/1
TABLET ORAL
Qty: 30 TABLET | Refills: 0 | Status: SHIPPED | OUTPATIENT
Start: 2019-02-01 | End: 2019-02-25 | Stop reason: SDUPTHER

## 2019-02-01 RX ORDER — CITALOPRAM 40 MG/1
TABLET ORAL
Qty: 30 TABLET | Refills: 0 | Status: SHIPPED | OUTPATIENT
Start: 2019-02-01 | End: 2019-02-25 | Stop reason: SDUPTHER

## 2019-02-13 ENCOUNTER — CARE COORDINATION (OUTPATIENT)
Dept: CARE COORDINATION | Age: 59
End: 2019-02-13

## 2019-02-25 ENCOUNTER — OFFICE VISIT (OUTPATIENT)
Dept: FAMILY MEDICINE CLINIC | Age: 59
End: 2019-02-25
Payer: MEDICARE

## 2019-02-25 ENCOUNTER — CARE COORDINATION (OUTPATIENT)
Dept: CARE COORDINATION | Age: 59
End: 2019-02-25

## 2019-02-25 ENCOUNTER — HOSPITAL ENCOUNTER (OUTPATIENT)
Age: 59
Setting detail: SPECIMEN
Discharge: HOME | End: 2019-02-25
Payer: MEDICARE

## 2019-02-25 VITALS
BODY MASS INDEX: 35.12 KG/M2 | HEART RATE: 68 BPM | DIASTOLIC BLOOD PRESSURE: 70 MMHG | HEIGHT: 61 IN | WEIGHT: 186 LBS | SYSTOLIC BLOOD PRESSURE: 132 MMHG

## 2019-02-25 DIAGNOSIS — Z01.419 PAP SMEAR, AS PART OF ROUTINE GYNECOLOGICAL EXAMINATION: ICD-10-CM

## 2019-02-25 DIAGNOSIS — Z01.419 PAP SMEAR, AS PART OF ROUTINE GYNECOLOGICAL EXAMINATION: Primary | ICD-10-CM

## 2019-02-25 PROCEDURE — 3017F COLORECTAL CA SCREEN DOC REV: CPT | Performed by: FAMILY MEDICINE

## 2019-02-25 PROCEDURE — G0145 SCR C/V CYTO,THINLAYER,RESCR: HCPCS

## 2019-02-25 PROCEDURE — G8417 CALC BMI ABV UP PARAM F/U: HCPCS | Performed by: FAMILY MEDICINE

## 2019-02-25 PROCEDURE — G0101 CA SCREEN;PELVIC/BREAST EXAM: HCPCS | Performed by: FAMILY MEDICINE

## 2019-02-25 PROCEDURE — G8427 DOCREV CUR MEDS BY ELIG CLIN: HCPCS | Performed by: FAMILY MEDICINE

## 2019-02-25 PROCEDURE — G8598 ASA/ANTIPLAT THER USED: HCPCS | Performed by: FAMILY MEDICINE

## 2019-02-25 PROCEDURE — 1036F TOBACCO NON-USER: CPT | Performed by: FAMILY MEDICINE

## 2019-02-25 PROCEDURE — G8484 FLU IMMUNIZE NO ADMIN: HCPCS | Performed by: FAMILY MEDICINE

## 2019-02-25 PROCEDURE — P3000 SCREEN PAP BY TECH W MD SUPV: HCPCS | Performed by: FAMILY MEDICINE

## 2019-02-25 RX ORDER — AMLODIPINE BESYLATE 10 MG/1
TABLET ORAL
Qty: 90 TABLET | Refills: 1 | Status: SHIPPED | OUTPATIENT
Start: 2019-02-25 | End: 2019-10-10 | Stop reason: SDUPTHER

## 2019-02-25 RX ORDER — METFORMIN HYDROCHLORIDE 500 MG/1
TABLET, EXTENDED RELEASE ORAL
Qty: 180 TABLET | Refills: 1 | Status: SHIPPED | OUTPATIENT
Start: 2019-02-25 | End: 2019-10-10 | Stop reason: SDUPTHER

## 2019-02-25 RX ORDER — ISOSORBIDE MONONITRATE 30 MG/1
30 TABLET, EXTENDED RELEASE ORAL DAILY
Qty: 90 TABLET | Refills: 1 | Status: SHIPPED | OUTPATIENT
Start: 2019-02-25 | End: 2019-10-10 | Stop reason: SDUPTHER

## 2019-02-25 RX ORDER — BUPROPION HYDROCHLORIDE 150 MG/1
TABLET, EXTENDED RELEASE ORAL
Qty: 90 TABLET | Refills: 1 | Status: SHIPPED | OUTPATIENT
Start: 2019-02-25 | End: 2019-10-10 | Stop reason: SDUPTHER

## 2019-02-25 RX ORDER — CITALOPRAM 40 MG/1
TABLET ORAL
Qty: 90 TABLET | Refills: 1 | Status: SHIPPED | OUTPATIENT
Start: 2019-02-25 | End: 2019-10-10 | Stop reason: SDUPTHER

## 2019-02-25 RX ORDER — CLOPIDOGREL BISULFATE 75 MG/1
TABLET ORAL
Qty: 90 TABLET | Refills: 1 | Status: SHIPPED | OUTPATIENT
Start: 2019-02-25 | End: 2019-10-10 | Stop reason: SDUPTHER

## 2019-02-25 RX ORDER — TRAZODONE HYDROCHLORIDE 100 MG/1
TABLET ORAL
Qty: 90 TABLET | Refills: 1 | Status: SHIPPED | OUTPATIENT
Start: 2019-02-25 | End: 2019-10-10 | Stop reason: SDUPTHER

## 2019-02-25 RX ORDER — ATORVASTATIN CALCIUM 40 MG/1
TABLET, FILM COATED ORAL
Qty: 90 TABLET | Refills: 1 | Status: SHIPPED | OUTPATIENT
Start: 2019-02-25 | End: 2019-10-10 | Stop reason: SDUPTHER

## 2019-02-25 RX ORDER — NITROGLYCERIN 0.4 MG/1
0.4 TABLET SUBLINGUAL EVERY 5 MIN PRN
Qty: 25 TABLET | Refills: 1 | Status: SHIPPED | OUTPATIENT
Start: 2019-02-25 | End: 2019-10-09 | Stop reason: SDUPTHER

## 2019-02-25 ASSESSMENT — ENCOUNTER SYMPTOMS
SHORTNESS OF BREATH: 0
EYES NEGATIVE: 1
ALLERGIC/IMMUNOLOGIC NEGATIVE: 1
GASTROINTESTINAL NEGATIVE: 1
WHEEZING: 0

## 2019-02-25 ASSESSMENT — PATIENT HEALTH QUESTIONNAIRE - PHQ9
1. LITTLE INTEREST OR PLEASURE IN DOING THINGS: 0
SUM OF ALL RESPONSES TO PHQ9 QUESTIONS 1 & 2: 0
SUM OF ALL RESPONSES TO PHQ QUESTIONS 1-9: 0
2. FEELING DOWN, DEPRESSED OR HOPELESS: 0
SUM OF ALL RESPONSES TO PHQ QUESTIONS 1-9: 0

## 2019-02-26 LAB — COMMENT: NORMAL

## 2019-03-08 LAB — CYTOLOGY REPORT: NORMAL

## 2019-03-27 ENCOUNTER — CARE COORDINATION (OUTPATIENT)
Dept: CARE COORDINATION | Age: 59
End: 2019-03-27

## 2019-04-03 ENCOUNTER — OFFICE VISIT (OUTPATIENT)
Dept: CARDIOLOGY | Age: 59
End: 2019-04-03
Payer: MEDICARE

## 2019-04-03 VITALS
SYSTOLIC BLOOD PRESSURE: 118 MMHG | HEIGHT: 60 IN | DIASTOLIC BLOOD PRESSURE: 70 MMHG | WEIGHT: 189 LBS | HEART RATE: 79 BPM | BODY MASS INDEX: 37.11 KG/M2

## 2019-04-03 DIAGNOSIS — I25.10 CORONARY ARTERY DISEASE INVOLVING NATIVE CORONARY ARTERY OF NATIVE HEART WITHOUT ANGINA PECTORIS: Primary | ICD-10-CM

## 2019-04-03 DIAGNOSIS — E78.5 HYPERLIPIDEMIA, UNSPECIFIED HYPERLIPIDEMIA TYPE: ICD-10-CM

## 2019-04-03 DIAGNOSIS — I10 ESSENTIAL HYPERTENSION: ICD-10-CM

## 2019-04-03 DIAGNOSIS — I25.119 CORONARY ARTERY DISEASE INVOLVING NATIVE CORONARY ARTERY OF NATIVE HEART WITH ANGINA PECTORIS (HCC): ICD-10-CM

## 2019-04-03 PROCEDURE — 3017F COLORECTAL CA SCREEN DOC REV: CPT | Performed by: INTERNAL MEDICINE

## 2019-04-03 PROCEDURE — 93000 ELECTROCARDIOGRAM COMPLETE: CPT | Performed by: INTERNAL MEDICINE

## 2019-04-03 PROCEDURE — 1036F TOBACCO NON-USER: CPT | Performed by: INTERNAL MEDICINE

## 2019-04-03 PROCEDURE — G8427 DOCREV CUR MEDS BY ELIG CLIN: HCPCS | Performed by: INTERNAL MEDICINE

## 2019-04-03 PROCEDURE — 99213 OFFICE O/P EST LOW 20 MIN: CPT | Performed by: INTERNAL MEDICINE

## 2019-04-03 PROCEDURE — G8598 ASA/ANTIPLAT THER USED: HCPCS | Performed by: INTERNAL MEDICINE

## 2019-04-03 PROCEDURE — G8417 CALC BMI ABV UP PARAM F/U: HCPCS | Performed by: INTERNAL MEDICINE

## 2019-04-03 NOTE — PROGRESS NOTES
Today's Date: 4/3/2019  Patient's Name: Robin Fleming  Patient's age: 63 y.o., 1960    Subjective:  Robin Fleming  is here for follow up. She had cardiac cath  showing patent stents and non-obstructive disease. She reports significant improvement in chest pain after starting ranexa. She reports fluttering sensation in her chest occassionally. No dyspnea, no PND, no syncope or pre-syncope, no orthopnea. She had a fall in January and had bruising in right leg. She denies any fainting. It appears to be mechanical in nature. Past Medical History:   has a past medical history of Anxiety, Coronary artery disease, Depression, Diabetes mellitus, type 2 ([address] Examination of participant in clinical trial, Gastroesophageal reflux disease, Hyperlipidemia, Hypertension, Hypomagnesemia, Insomnia, Normal left ventricular systolic function, Obesity, Obstructive sleep apnea, and Sciatic nerve disease. Past Surgical History:   has a past surgical history that includes Hysterectomy (); Cardiac catheterization (2011); Cardiac catheterization (2008); Cardiac catheterization (2009); fracture surgery (Right, 10/1998); Upper gastrointestinal endoscopy (2005); Cardiac catheterization (2013);  section ();  section (1985); Finger trigger release (Left, 2015); Coronary angioplasty (2009); Coronary angioplasty (2013); Breast biopsy (Right, 9-15-14); Breast lumpectomy (Right, 10-1-2014); and Cholecystectomy (). Home Medications:  Prior to Admission medications    Medication Sig Start Date End Date Taking?  Authorizing Provider   nitroGLYCERIN (NITROSTAT) 0.4 MG SL tablet Place 1 tablet under the tongue every 5 minutes as needed for Chest pain 19  Yes Balta Gutierrez MD   citalopram (CELEXA) 40 MG tablet TAKE 1 TABLET BY MOUTH ONCE DAILY 19  Yes Balta Gutierrez MD   buPROPion [facility] SR) 150 MG extended release tablet TAKE 1 TABLET BY MOUTH ONCE DAILY 2/25/19  Yes Irena Clark MD   linagliptin (TRADJENTA) 5 MG tablet TAKE 1 TABLET BY MOUTH ONCE DAILY 2/25/19  Yes Irena Clark MD   traZODone (DESYREL) 100 MG tablet TAKE 1 TABLET BY MOUTH ONCE DAILY NIGHTLY 2/25/19  Yes Irena Clark MD   atorvastatin (LIPITOR) 40 MG tablet TAKE 1 TABLET BY MOUTH ONCE DAILY 2/25/19  Yes Irena Clark MD   amLODIPine (NORVASC) 10 MG tablet TAKE 1 TABLET BY MOUTH ONCE DAILY 2/25/19  Yes Irena Clark MD   metFORMIN (GLUCOPHAGE-XR) 500 MG extended release tablet TAKE 1 TABLET BY MOUTH TWICE DAILY 2/25/19  Yes Irena Clark MD   isosorbide mononitrate (IMDUR) 30 MG extended release tablet Take 1 tablet by mouth daily 2/25/19  Yes Irena Clark MD   clopidogrel (PLAVIX) 75 MG tablet TAKE 1 TABLET BY MOUTH ONCE DAILY 2/25/19  Yes Irena Clark MD   ranolazine (RANEXA) 500 MG extended release tablet Take 1 tablet by mouth 2 times daily 12/17/18  Yes Irena lCark MD   losartan (COZAAR) 100 MG tablet TAKE ONE TABLET BY MOUTH ONCE DAILY 8/6/18  Yes Irena Clark MD   metoprolol (LOPRESSOR) 100 MG tablet Take 1 tablet by mouth 2 times daily 8/6/18  Yes Irena Clark MD   Blood Glucose Monitoring Suppl MICHELLE One glucometer to test 2 times a day and prn Type II diabetes uncontrolled not on insulin 6/11/18  Yes Irena Clark MD   Glucose Blood (BLOOD GLUCOSE TEST STRIPS) STRP Test 2 times a day and prn. Uncontrolled type 2 diabetes not on insulin. 6/11/18  Yes Irena Clark MD   Lancets MISC Lancet device and lancets. Test 2 times an day and prn. Type II Diabetes uncontrolled not on insulin 6/11/18  Yes Irena Clark MD   glimepiride (AMARYL) 4 MG tablet Take 1 tablet by mouth 2 times daily (with meals) 6/6/18  Yes Irena Clark MD   LORazepam (ATIVAN) 0.5 MG tablet TAKE ONE TABLET BY MOUTH AT BEDTIME AS NEEDED 5/19/17  Yes Meriel Codding, DO   aspirin 81 MG tablet Take 81 mg by mouth daily.    Yes Historical Provider, MD   Blood Glucose Monitoring Suppl Supplies MISC 1 Glucometer, test strips x 100 with 5 refills, 1 lancet device. Patient to test BID and prn. 7/3/17 7/4/18  Cindy Rolon MD       Allergies:  Codeine and Morphine    Social History:   reports that she has never smoked. She has never used smokeless tobacco. She reports that she does not drink alcohol or use drugs. ROS: Otherwise 10 systems reviewed and negative. Ht 5' (1.524 m)   Wt 189 lb (85.7 kg)   LMP  (LMP Unknown)   BMI 36.91 kg/m²     Vitals:    04/03/19 1411   BP: 118/70   Pulse: 79       Vitals as above. Alert and oriented x 3. No JVD, or carotid bruits. Lungs are clear to auscultation. Heart sounds are regular, normal, no murmur. Abdomen is soft, no tenderness. Extremities No peripheral edema    Cardiac Data:  EKG: NSR, NL ECG    Labs:     CBC: No results for input(s): WBC, HGB, HCT, PLT in the last 72 hours. BMP: No results for input(s): NA, K, CO2, BUN, CREATININE, LABGLOM, GLUCOSE in the last 72 hours. PT/INR: No results for input(s): PROTIME, INR in the last 72 hours. FASTING LIPID PANEL:  Lab Results   Component Value Date    HDL 54 05/04/2018    TRIG 79 05/04/2018     LIVER PROFILE:No results for input(s): AST, ALT, LABALBU in the last 72 hours. Cardiac cath 8/16/18  Indications:    - Angina - Unstable     Patent distal LAD and proximal Ramus stents.   Otherwise, non-obstructive CAD.   Normal LV systolic function.      Recommendations      Medical therapy as needed.   Risk factor modification. Assessment / Acute Cardiac Problems:      1-CAD: with multiple PCIs in the past last one 04/2013: Nuclear stress test 02/2016 with no ischemia or infarction. Cardiac cath 8/16/18 for recurrent chest pain showed patent distal LAD and proximal ramus stents with otherwise non-obstructive CAD. 2-HTN: well controlled. 3-HLP: on statin  4-Preserved LV systolic function.   5-Atypical non-cardiac musculoskeletal chest pain   6-Recurrent palpitations now resolved with event monitor 06/2016 showed NSR with rare short runs of PATs.     Plan of Treatment:      1-Continue current medical treatment with ASA, Plavix, BB, ARB, CCB, Nitrates, ranexa and statin. 2-Diet and exercise to loose weight  3-Aggressive risk factors modifications.   4-RTC in 6 months.         Aris Gosselin, MD  [facility]           [phone]

## 2019-04-05 ENCOUNTER — CARE COORDINATION (OUTPATIENT)
Dept: CARE COORDINATION | Age: 59
End: 2019-04-05

## 2019-04-05 NOTE — LETTER
[address], [address]    Jacqueline Garza, RN        April 12, 2019    [facility] [id number]      Dear Shadia Penn:    I have been unable to reach you by phone. I hope you are doing well. Please call me at [phone] to discuss overdue lab orders and continued assistance with your health care.      Future Appointments   Date Time Provider Hank Avalos   6/19/2019  3:00 PM [facility] Goldberg[facility]   10/9/2019  3:30 PM MD [facility]       Sincerely,        Jacqueline Garza, RN

## 2019-04-05 NOTE — CARE COORDINATION
Ambulatory Care Coordination Note  4/5/2019  CM Risk Score: 2  Zonia Mortality Risk Score:      ACC: Jean Sena, RN    Summary Note: Michelle Hammond has Type II Diabetes- without use of insulin- uncontrolled, Obesity, HTN, Hyperlipidemia, Depression, JUMA, GERD, CAD. She follows with cardiology. Cardiac cath 8/16/2018.     Plan of Care : Reassess and if appropriate- graduate from [facility]     F/U on overdue lab work. F/U on BS.      Tra Sherman is overdue for lab work. She has the orders and was to have drawn before seeing PCP 2/2019. She did see cardiologist 4/2019 and did not have lab work drawn at that time. She reported that she did have eye exam in [place] and was to have report forwarded to PCP. She is not scheduled to see PCP until 6/2019. Unable to reach by phone- LM requesting return call. 4/9/2019 Left second message requesting return call. 4/12/2019 No response from Shiela Retana. Letter mailed requesting return call. Lab Results   Component Value Date    LABA1C 8.6 (H) 05/04/2018    LABA1C 9.7 (H) 03/09/2017    LABA1C 6.6 (H) 08/31/2016     Lab Results   Component Value Date     05/04/2018     03/09/2017     08/31/2016     Care Coordination Interventions    Program Enrollment:  Rising Risk  Referral from Primary Care Provider:  Yes  Suggested Interventions and Community Resources  Diabetes Education:  Completed (Comment: had diabetes education in the past- will continue to review)  Zone Management Tools:  Completed       Prior to Admission medications    Medication Sig Start Date End Date Taking?  Authorizing Provider   nitroGLYCERIN (NITROSTAT) 0.4 MG SL tablet Place 1 tablet under the tongue every 5 minutes as needed for Chest pain 2/25/19   Bridget Cortez MD   citalopram (CELEXA) 40 MG tablet TAKE 1 TABLET BY MOUTH ONCE DAILY 2/25/19   Bridget Cortez MD   [facility]) 150 MG extended release tablet TAKE 1 TABLET BY MOUTH ONCE DAILY 2/25/19   Bridget Cortez MD linagliptin (TRADJENTA) 5 MG tablet TAKE 1 TABLET BY MOUTH ONCE DAILY 2/25/19   Davion Schultz MD   traZODone (DESYREL) 100 MG tablet TAKE 1 TABLET BY MOUTH ONCE DAILY NIGHTLY 2/25/19   Davion Schultz MD   atorvastatin (LIPITOR) 40 MG tablet TAKE 1 TABLET BY MOUTH ONCE DAILY 2/25/19   Davion Schultz MD   amLODIPine (NORVASC) 10 MG tablet TAKE 1 TABLET BY MOUTH ONCE DAILY 2/25/19   Davion Schultz MD   metFORMIN (GLUCOPHAGE-XR) 500 MG extended release tablet TAKE 1 TABLET BY MOUTH TWICE DAILY 2/25/19   Davion Schultz MD   isosorbide mononitrate (IMDUR) 30 MG extended release tablet Take 1 tablet by mouth daily 2/25/19   Davion Schultz MD   clopidogrel (PLAVIX) 75 MG tablet TAKE 1 TABLET BY MOUTH ONCE DAILY 2/25/19   Davion Schultz MD   ranolazine (RANEXA) 500 MG extended release tablet Take 1 tablet by mouth 2 times daily 12/17/18   Davion Schultz MD   losartan (COZAAR) 100 MG tablet TAKE ONE TABLET BY MOUTH ONCE DAILY 8/6/18   Davion Schultz MD   metoprolol (LOPRESSOR) 100 MG tablet Take 1 tablet by mouth 2 times daily 8/6/18   Davion Schultz MD   Blood Glucose Monitoring Suppl MICHELLE One glucometer to test 2 times a day and prn Type II diabetes uncontrolled not on insulin 6/11/18   Davion Schultz MD   Glucose Blood (BLOOD GLUCOSE TEST STRIPS) STRP Test 2 times a day and prn. Uncontrolled type 2 diabetes not on insulin. 6/11/18   Davion Schultz MD   Lancets MISC Lancet device and lancets. Test 2 times an day and prn. Type II Diabetes uncontrolled not on insulin 6/11/18   Davion Schultz MD   glimepiride (AMARYL) 4 MG tablet Take 1 tablet by mouth 2 times daily (with meals) 6/6/18   Davion Schultz MD   Blood Glucose Monitoring Suppl Supplies MISC 1 Glucometer, test strips x 100 with 5 refills, 1 lancet device.  Patient to test BID and prn. 7/3/17 7/4/18  Davion Schultz MD   LORazepam (ATIVAN) 0.5 MG tablet TAKE ONE TABLET BY MOUTH AT BEDTIME AS NEEDED 5/19/17   Barbara Ruano DO   aspirin 81 MG tablet Take 81 mg by

## 2019-04-26 ENCOUNTER — CARE COORDINATION (OUTPATIENT)
Dept: CARE COORDINATION | Age: 59
End: 2019-04-26

## 2019-04-26 NOTE — CARE COORDINATION
Afia Scott has Type II Diabetes- without use of insulin- uncontrolled, Obesity, HTN, Hyperlipidemia, Depression, JUMA, GERD, CAD. She follows with cardiology. Cardiac cath 8/16/2018.     Plan of Care : Unable to reach Patient- sent for review. Continue to assess, support, and educate.                              F/U on overdue lab work. F/U on BS. No response to previous calls and letter mailed requesting return call. Sent for review.      Lab Results   Component Value Date    LABA1C 8.6 (H) 05/04/2018    LABA1C 9.7 (H) 03/09/2017    LABA1C 6.6 (H) 08/31/2016     Lab Results   Component Value Date     05/04/2018     03/09/2017     08/31/2016     Future Appointments   Date Time Provider Hank Avalos   6/19/2019  3:00 PM MD BONNIE Erickson [facility]   10/9/2019  3:30 PM MD FLOR Delgado [facility]

## 2019-05-01 ENCOUNTER — CARE COORDINATION (OUTPATIENT)
Dept: CARE COORDINATION | Age: 59
End: 2019-05-01

## 2019-05-01 NOTE — LETTER
[address], [address]    Manuel Emerson RN        May 1, 2019    [address] [address]      Dear Ronak Gimenez:    I have been unable to reach you by phone. I hope you are doing well. I am enclosing you fasting lab orders that you are due for- Hgb A1C is overdue. You can have all labs drawn now. It also includes a urine for microalbumin. Future Appointments   Date Time Provider Hank Avalos   6/19/2019  3:00 PM [facility]   10/9/2019  3:30 PM [facility]     Please call me at [phone].      Be Well,        Manuel Emerson RN

## 2019-05-23 ENCOUNTER — CARE COORDINATION (OUTPATIENT)
Dept: CARE COORDINATION | Age: 59
End: 2019-05-23

## 2019-05-23 NOTE — LETTER
[address], [address] [facility]    Jasen Barber RN        May 23, 2019    [address] [address]      Dear Amanda Malloy:    I hope you are doing well. I have been unable to reach you. Please remember to have lab work drawn before your upcoming appointment. Orders were previoulsy mailed. Also when you see Dr. Vanesa Nesbitt- please bring BS log or meter for review. Future Appointments   Date Time Provider [facility]   6/19/2019  3:00 PM [facility]   10/9/2019  3:30 PM Lashawn Quevedo MD [facility]     Lab Results   Component Value Date    LABA1C 8.6 (H) 05/04/2018    LABA1C 9.7 (H) 03/09/2017    LABA1C 6.6 (H) 08/31/2016     Lab Results   Component Value Date     05/04/2018     03/09/2017     08/31/2016     My number is [phone].       Sincerely,        Jasen Barber RN

## 2019-05-23 NOTE — CARE COORDINATION
Ambulatory Care Coordination Note  5/23/2019  CM Risk Score: 6  Zonia Mortality Risk Score:      ACC: Paige Encarnacion, RN    Summary Note: Beronica Chester has Type II Diabetes- without use of insulin- uncontrolled, Obesity, HTN, Hyperlipidemia, Depression, JUMA, GERD, CAD. She follows with cardiology. Cardiac cath 8/16/2018.     Plan of Care : This writer will see Patient at PCP appt. Needs eye exam report- she stated she had it done in [place] and was going to have report forwarded to PCP.                           Continue to assess, support, and educate.                              F/U on overdue lab work.                          F/U on BS.      Chart was reviewed per protocol. Mailed lab orders again and requested Patient to call this writer. No response from Patient to previous calls and mailing. Unable to reach Patient- mailed another letter requesting return call and enclosed lab orders. 5/23/2019 No response from Patient. Unable to reach by phone- LM.  Letter mailed with upcoming appointments, previous Hgb A1C results, and BS log sheet with target ranges and reminder to have labs done- orders previously mailed.            Lab Results   Component Value Date     LABA1C 8.6 (H) 05/04/2018     LABA1C 9.7 (H) 03/09/2017     LABA1C 6.6 (H) 08/31/2016            Lab Results   Component Value Date      05/04/2018      03/09/2017      08/31/2016             Future Appointments   Date Time Provider Hank Avalos   6/19/2019  3:00 PM [facility]   10/9/2019  3:30 PM Merlyn Vargas MD [facility]      Care Coordination Interventions    Program Enrollment:  Rising Risk  Referral from Primary Care Provider:  Yes  Suggested Interventions and Community Resources  Diabetes Education:  Completed (Comment: had diabetes education in the past- will continue to review)  Registered Dietician:  Declined  Zone Management Tools:  Completed (Comment: DM)         Prior to Admission medications    Medication Sig Start Date End Date Taking? Authorizing Provider   linagliptin (TRADJENTA) 5 MG tablet TAKE 1 TABLET BY MOUTH ONCE DAILY 4/22/19   Cherelle Saxena MD   nitroGLYCERIN (NITROSTAT) 0.4 MG SL tablet Place 1 tablet under the tongue every 5 minutes as needed for Chest pain 2/25/19   Cherelle Saxena MD   citalopram (CELEXA) 40 MG tablet TAKE 1 TABLET BY MOUTH ONCE DAILY 2/25/19   Cherelle Saxena MD   buPROPion [facility] SR) 150 MG extended release tablet TAKE 1 TABLET BY MOUTH ONCE DAILY 2/25/19   Cherelle Saxnea MD   traZODone (DESYREL) 100 MG tablet TAKE 1 TABLET BY MOUTH ONCE DAILY NIGHTLY 2/25/19   Cherelle Saxena MD   atorvastatin (LIPITOR) 40 MG tablet TAKE 1 TABLET BY MOUTH ONCE DAILY 2/25/19   Cherelle Saxena MD   amLODIPine (NORVASC) 10 MG tablet TAKE 1 TABLET BY MOUTH ONCE DAILY 2/25/19   Cherelle Saxena MD   metFORMIN (GLUCOPHAGE-XR) 500 MG extended release tablet TAKE 1 TABLET BY MOUTH TWICE DAILY 2/25/19   Cherelle Saxena MD   isosorbide mononitrate (IMDUR) 30 MG extended release tablet Take 1 tablet by mouth daily 2/25/19   Cherelle Saxena MD   clopidogrel (PLAVIX) 75 MG tablet TAKE 1 TABLET BY MOUTH ONCE DAILY 2/25/19   Cherelle Saxena MD   ranolazine (RANEXA) 500 MG extended release tablet Take 1 tablet by mouth 2 times daily 12/17/18   Cherelle Saxena MD   losartan (COZAAR) 100 MG tablet TAKE ONE TABLET BY MOUTH ONCE DAILY 8/6/18   Cherelle Saxena MD   metoprolol (LOPRESSOR) 100 MG tablet Take 1 tablet by mouth 2 times daily 8/6/18   Cherelle aSxena MD   Blood Glucose Monitoring Suppl MICHELLE One glucometer to test 2 times a day and prn Type II diabetes uncontrolled not on insulin 6/11/18   Cherelle Saxena MD   Glucose Blood (BLOOD GLUCOSE TEST STRIPS) STRP Test 2 times a day and prn. Uncontrolled type 2 diabetes not on insulin. 6/11/18   Cherelle Saxena MD   Lancets MISC Lancet device and lancets. Test 2 times an day and prn.  Type II Diabetes uncontrolled not on insulin 6/11/18 Joni Sims MD   glimepiride (AMARYL) 4 MG tablet Take 1 tablet by mouth 2 times daily (with meals) 6/6/18   Joni Sims MD   Blood Glucose Monitoring Suppl Supplies MISC 1 Glucometer, test strips x 100 with 5 refills, 1 lancet device. Patient to test BID and prn. 7/3/17 7/4/18  Joni Sims MD   LORazepam (ATIVAN) 0.5 MG tablet TAKE ONE TABLET BY MOUTH AT BEDTIME AS NEEDED 5/19/17   John Mishra DO   aspirin 81 MG tablet Take 81 mg by mouth daily.     Historical Provider, MD       Future Appointments   Date Time Provider Hank Avalos   6/19/2019  3:00 PM Joni Sims MD [facility]   10/9/2019  3:30 PM Elsa Hartmann MD [facility]

## 2019-05-29 ENCOUNTER — CARE COORDINATION (OUTPATIENT)
Dept: CARE COORDINATION | Age: 59
End: 2019-05-29

## 2019-05-29 NOTE — CARE COORDINATION
Ambulatory Care Coordination Note  5/29/2019  CM Risk Score: 6  Zonia Mortality Risk Score:      ACC: Hailey Gardner RN    Summary Note:  Sirisha Salgado has Type II Diabetes- without use of insulin- uncontrolled, Obesity, HTN, Hyperlipidemia, Depression, JUMA, GERD, CAD. She follows with cardiology. Cardiac cath 8/16/2018.     Plan of Care : Unable to reach- ACC completed. This writer is willing to re- enroll in future if needed. This writer has not been able to reach Patient by phone since 3/27/2019. This writer has left several messages, mailed several letters requesting return call and including Fasting lab orders. No response from Patient     5/29/2019 Left two messages requesting return call. Mailed letter- unable to reach- ACC completed. Encouraged Patient to call this writer if wanting to resume ACC. Care Coordination Interventions    Program Enrollment:  Rising Risk  Referral from Primary Care Provider:  Yes  Suggested Interventions and Community Resources  Diabetes Education:  Completed (Comment: had diabetes education in the past- will continue to review)  Registered Dietician:  Declined  Zone Management Tools:  Completed (Comment: DM)         Prior to Admission medications    Medication Sig Start Date End Date Taking?  Authorizing Provider   linagliptin (TRADJENTA) 5 MG tablet TAKE 1 TABLET BY MOUTH ONCE DAILY 4/22/19   Tootie Hicks MD   nitroGLYCERIN (NITROSTAT) 0.4 MG SL tablet Place 1 tablet under the tongue every 5 minutes as needed for Chest pain 2/25/19   Tootie Hicks MD   citalopram (CELEXA) 40 MG tablet TAKE 1 TABLET BY MOUTH ONCE DAILY 2/25/19   Tootie Hicks MD   buPROPion [facility] SR) 150 MG extended release tablet TAKE 1 TABLET BY MOUTH ONCE DAILY 2/25/19   Tootie Hicks MD   traZODone (DESYREL) 100 MG tablet TAKE 1 TABLET BY MOUTH ONCE DAILY NIGHTLY 2/25/19   Tootie Hicks MD   atorvastatin (LIPITOR) 40 MG tablet TAKE 1 TABLET BY MOUTH ONCE DAILY 2/25/19   Tootie Hicks MD amLODIPine (NORVASC) 10 MG tablet TAKE 1 TABLET BY MOUTH ONCE DAILY 2/25/19   Vivek Patel MD   metFORMIN (GLUCOPHAGE-XR) 500 MG extended release tablet TAKE 1 TABLET BY MOUTH TWICE DAILY 2/25/19   Vivek Patel MD   isosorbide mononitrate (IMDUR) 30 MG extended release tablet Take 1 tablet by mouth daily 2/25/19   Vivek Patel MD   clopidogrel (PLAVIX) 75 MG tablet TAKE 1 TABLET BY MOUTH ONCE DAILY 2/25/19   Vivek Patel MD   ranolazine (RANEXA) 500 MG extended release tablet Take 1 tablet by mouth 2 times daily 12/17/18   Vivek Patel MD   losartan (COZAAR) 100 MG tablet TAKE ONE TABLET BY MOUTH ONCE DAILY 8/6/18   Vivek Patel MD   metoprolol (LOPRESSOR) 100 MG tablet Take 1 tablet by mouth 2 times daily 8/6/18   Vivek Patel MD   Blood Glucose Monitoring Suppl MICHELLE One glucometer to test 2 times a day and prn Type II diabetes uncontrolled not on insulin 6/11/18   Vivek Patel MD   Glucose Blood (BLOOD GLUCOSE TEST STRIPS) STRP Test 2 times a day and prn. Uncontrolled type 2 diabetes not on insulin. 6/11/18   Vivek Patel MD   Lancets MISC Lancet device and lancets. Test 2 times an day and prn. Type II Diabetes uncontrolled not on insulin 6/11/18   Vivek Patel MD   glimepiride (AMARYL) 4 MG tablet Take 1 tablet by mouth 2 times daily (with meals) 6/6/18   Vivek Patel MD   Blood Glucose Monitoring Suppl Supplies MISC 1 Glucometer, test strips x 100 with 5 refills, 1 lancet device. Patient to test BID and prn. 7/3/17 7/4/18  Vivek Patel MD   LORazepam (ATIVAN) 0.5 MG tablet TAKE ONE TABLET BY MOUTH AT BEDTIME AS NEEDED 5/19/17   Memo Stone DO   aspirin 81 MG tablet Take 81 mg by mouth daily.     Historical Provider, MD       Future Appointments   Date Time Provider Hank Avalos   6/19/2019  3:00 PM Vivek Patel MD [facility]   10/9/2019  3:30 PM MD FLOR Virgen J[facility]

## 2019-08-29 ENCOUNTER — TELEPHONE (OUTPATIENT)
Dept: FAMILY MEDICINE CLINIC | Age: 59
End: 2019-08-29

## 2019-10-09 ENCOUNTER — OFFICE VISIT (OUTPATIENT)
Dept: CARDIOLOGY | Age: 59
End: 2019-10-09
Payer: MEDICARE

## 2019-10-09 VITALS
WEIGHT: 180 LBS | SYSTOLIC BLOOD PRESSURE: 142 MMHG | HEIGHT: 61 IN | DIASTOLIC BLOOD PRESSURE: 84 MMHG | HEART RATE: 71 BPM | BODY MASS INDEX: 33.99 KG/M2

## 2019-10-09 DIAGNOSIS — I25.119 CORONARY ARTERY DISEASE INVOLVING NATIVE CORONARY ARTERY OF NATIVE HEART WITH ANGINA PECTORIS (HCC): Primary | ICD-10-CM

## 2019-10-09 DIAGNOSIS — I10 ESSENTIAL HYPERTENSION: ICD-10-CM

## 2019-10-09 DIAGNOSIS — E78.5 HYPERLIPIDEMIA, UNSPECIFIED HYPERLIPIDEMIA TYPE: ICD-10-CM

## 2019-10-09 PROCEDURE — G8427 DOCREV CUR MEDS BY ELIG CLIN: HCPCS | Performed by: INTERNAL MEDICINE

## 2019-10-09 PROCEDURE — 1036F TOBACCO NON-USER: CPT | Performed by: INTERNAL MEDICINE

## 2019-10-09 PROCEDURE — G8598 ASA/ANTIPLAT THER USED: HCPCS | Performed by: INTERNAL MEDICINE

## 2019-10-09 PROCEDURE — 3017F COLORECTAL CA SCREEN DOC REV: CPT | Performed by: INTERNAL MEDICINE

## 2019-10-09 PROCEDURE — G8417 CALC BMI ABV UP PARAM F/U: HCPCS | Performed by: INTERNAL MEDICINE

## 2019-10-09 PROCEDURE — G8484 FLU IMMUNIZE NO ADMIN: HCPCS | Performed by: INTERNAL MEDICINE

## 2019-10-09 PROCEDURE — 99214 OFFICE O/P EST MOD 30 MIN: CPT | Performed by: INTERNAL MEDICINE

## 2019-10-09 PROCEDURE — 93000 ELECTROCARDIOGRAM COMPLETE: CPT | Performed by: INTERNAL MEDICINE

## 2019-10-09 RX ORDER — NITROGLYCERIN 0.4 MG/1
0.4 TABLET SUBLINGUAL EVERY 5 MIN PRN
Qty: 25 TABLET | Refills: 1 | Status: SHIPPED | OUTPATIENT
Start: 2019-10-09 | End: 2020-10-15 | Stop reason: SDUPTHER

## 2019-10-10 ENCOUNTER — HOSPITAL ENCOUNTER (OUTPATIENT)
Dept: LAB | Age: 59
Discharge: HOME | End: 2019-10-10
Payer: MEDICARE

## 2019-10-10 ENCOUNTER — TELEPHONE (OUTPATIENT)
Dept: SURGERY | Age: 59
End: 2019-10-10

## 2019-10-10 ENCOUNTER — OFFICE VISIT (OUTPATIENT)
Dept: FAMILY MEDICINE CLINIC | Age: 59
End: 2019-10-10
Payer: MEDICARE

## 2019-10-10 VITALS
SYSTOLIC BLOOD PRESSURE: 128 MMHG | HEIGHT: 61 IN | DIASTOLIC BLOOD PRESSURE: 72 MMHG | HEART RATE: 68 BPM | BODY MASS INDEX: 34.36 KG/M2 | WEIGHT: 182 LBS

## 2019-10-10 DIAGNOSIS — E83.42 HYPOMAGNESEMIA: ICD-10-CM

## 2019-10-10 DIAGNOSIS — G47.00 INSOMNIA, UNSPECIFIED TYPE: ICD-10-CM

## 2019-10-10 DIAGNOSIS — I25.119 CORONARY ARTERY DISEASE INVOLVING NATIVE CORONARY ARTERY OF NATIVE HEART WITH ANGINA PECTORIS (HCC): Primary | ICD-10-CM

## 2019-10-10 DIAGNOSIS — E66.09 CLASS 2 OBESITY DUE TO EXCESS CALORIES WITHOUT SERIOUS COMORBIDITY WITH BODY MASS INDEX (BMI) OF 37.0 TO 37.9 IN ADULT: ICD-10-CM

## 2019-10-10 DIAGNOSIS — K21.9 GASTROESOPHAGEAL REFLUX DISEASE WITHOUT ESOPHAGITIS: ICD-10-CM

## 2019-10-10 DIAGNOSIS — R92.8 ABNORMAL MAMMOGRAM OF RIGHT BREAST: ICD-10-CM

## 2019-10-10 DIAGNOSIS — E78.5 HYPERLIPIDEMIA, UNSPECIFIED HYPERLIPIDEMIA TYPE: ICD-10-CM

## 2019-10-10 DIAGNOSIS — R91.1 LUNG NODULE: ICD-10-CM

## 2019-10-10 DIAGNOSIS — Z12.31 ENCOUNTER FOR SCREENING MAMMOGRAM FOR BREAST CANCER: ICD-10-CM

## 2019-10-10 DIAGNOSIS — I10 ESSENTIAL HYPERTENSION: ICD-10-CM

## 2019-10-10 DIAGNOSIS — Z12.11 ENCOUNTER FOR SCREENING COLONOSCOPY: ICD-10-CM

## 2019-10-10 DIAGNOSIS — E11.3299 BACKGROUND DIABETIC RETINOPATHY (HCC): ICD-10-CM

## 2019-10-10 DIAGNOSIS — E11.9 TYPE 2 DIABETES MELLITUS WITHOUT COMPLICATION, WITHOUT LONG-TERM CURRENT USE OF INSULIN (HCC): ICD-10-CM

## 2019-10-10 DIAGNOSIS — G47.33 OBSTRUCTIVE SLEEP APNEA: ICD-10-CM

## 2019-10-10 DIAGNOSIS — F33.42 RECURRENT MAJOR DEPRESSIVE DISORDER, IN FULL REMISSION (HCC): ICD-10-CM

## 2019-10-10 LAB
ABSOLUTE EOS #: 0.2 K/UL (ref 0–0.4)
ABSOLUTE IMMATURE GRANULOCYTE: ABNORMAL K/UL (ref 0–0.3)
ABSOLUTE LYMPH #: 1.9 K/UL (ref 1–4.8)
ABSOLUTE MONO #: 0.4 K/UL (ref 0.1–1.2)
ALBUMIN SERPL-MCNC: 4.2 G/DL (ref 3.5–5.2)
ALBUMIN/GLOBULIN RATIO: 1.4 (ref 1–2.5)
ALP BLD-CCNC: 89 U/L (ref 35–104)
ALT SERPL-CCNC: 23 U/L (ref 5–33)
ANION GAP SERPL CALCULATED.3IONS-SCNC: 8 MMOL/L (ref 9–17)
AST SERPL-CCNC: 18 U/L
BASOPHILS # BLD: 1 % (ref 0–1)
BASOPHILS ABSOLUTE: 0.1 K/UL (ref 0–0.2)
BILIRUB SERPL-MCNC: 0.32 MG/DL (ref 0.3–1.2)
BUN BLDV-MCNC: 12 MG/DL (ref 6–20)
BUN/CREAT BLD: 12 (ref 9–20)
CALCIUM SERPL-MCNC: 9.5 MG/DL (ref 8.6–10.4)
CHLORIDE BLD-SCNC: 102 MMOL/L (ref 98–107)
CHOLESTEROL/HDL RATIO: 2.5
CHOLESTEROL: 132 MG/DL
CO2: 31 MMOL/L (ref 20–31)
CREAT SERPL-MCNC: 0.99 MG/DL (ref 0.5–0.9)
DIFFERENTIAL TYPE: ABNORMAL
EOSINOPHILS RELATIVE PERCENT: 2 % (ref 1–7)
ESTIMATED AVERAGE GLUCOSE: 163 MG/DL
GFR AFRICAN AMERICAN: >60 ML/MIN
GFR NON-AFRICAN AMERICAN: 58 ML/MIN
GFR SERPL CREATININE-BSD FRML MDRD: ABNORMAL ML/MIN/{1.73_M2}
GFR SERPL CREATININE-BSD FRML MDRD: ABNORMAL ML/MIN/{1.73_M2}
GLUCOSE BLD-MCNC: 198 MG/DL (ref 70–99)
HBA1C MFR BLD: 7.3 % (ref 4.8–5.9)
HCT VFR BLD CALC: 35.9 % (ref 36–46)
HDLC SERPL-MCNC: 52 MG/DL
HEMOGLOBIN: 12.2 G/DL (ref 12–16)
IMMATURE GRANULOCYTES: ABNORMAL %
LDL CHOLESTEROL: 65 MG/DL (ref 0–130)
LYMPHOCYTES # BLD: 27 % (ref 16–46)
MAGNESIUM: 1.4 MG/DL (ref 1.6–2.6)
MCH RBC QN AUTO: 32.2 PG (ref 26–34)
MCHC RBC AUTO-ENTMCNC: 34 G/DL (ref 31–37)
MCV RBC AUTO: 94.9 FL (ref 80–100)
MONOCYTES # BLD: 6 % (ref 4–11)
NRBC AUTOMATED: ABNORMAL PER 100 WBC
PDW BLD-RTO: 13.3 % (ref 11–14.5)
PLATELET # BLD: 254 K/UL (ref 140–450)
PLATELET ESTIMATE: ABNORMAL
PMV BLD AUTO: 8.5 FL (ref 6–12)
POTASSIUM SERPL-SCNC: 4.9 MMOL/L (ref 3.7–5.3)
RBC # BLD: 3.78 M/UL (ref 4–5.2)
RBC # BLD: ABNORMAL 10*6/UL
SEG NEUTROPHILS: 64 % (ref 43–77)
SEGMENTED NEUTROPHILS ABSOLUTE COUNT: 4.6 K/UL (ref 1.8–7.7)
SODIUM BLD-SCNC: 141 MMOL/L (ref 135–144)
TOTAL PROTEIN: 7.2 G/DL (ref 6.4–8.3)
TRIGL SERPL-MCNC: 74 MG/DL
VLDLC SERPL CALC-MCNC: NORMAL MG/DL (ref 1–30)
WBC # BLD: 7.1 K/UL (ref 3.5–11)
WBC # BLD: ABNORMAL 10*3/UL

## 2019-10-10 PROCEDURE — G8484 FLU IMMUNIZE NO ADMIN: HCPCS | Performed by: FAMILY MEDICINE

## 2019-10-10 PROCEDURE — 1036F TOBACCO NON-USER: CPT | Performed by: FAMILY MEDICINE

## 2019-10-10 PROCEDURE — 85025 COMPLETE CBC W/AUTO DIFF WBC: CPT

## 2019-10-10 PROCEDURE — G8598 ASA/ANTIPLAT THER USED: HCPCS | Performed by: FAMILY MEDICINE

## 2019-10-10 PROCEDURE — 3046F HEMOGLOBIN A1C LEVEL >9.0%: CPT | Performed by: FAMILY MEDICINE

## 2019-10-10 PROCEDURE — G8427 DOCREV CUR MEDS BY ELIG CLIN: HCPCS | Performed by: FAMILY MEDICINE

## 2019-10-10 PROCEDURE — 99215 OFFICE O/P EST HI 40 MIN: CPT | Performed by: FAMILY MEDICINE

## 2019-10-10 PROCEDURE — 83735 ASSAY OF MAGNESIUM: CPT

## 2019-10-10 PROCEDURE — 80053 COMPREHEN METABOLIC PANEL: CPT

## 2019-10-10 PROCEDURE — 2022F DILAT RTA XM EVC RTNOPTHY: CPT | Performed by: FAMILY MEDICINE

## 2019-10-10 PROCEDURE — G8417 CALC BMI ABV UP PARAM F/U: HCPCS | Performed by: FAMILY MEDICINE

## 2019-10-10 PROCEDURE — 83036 HEMOGLOBIN GLYCOSYLATED A1C: CPT

## 2019-10-10 PROCEDURE — 80061 LIPID PANEL: CPT

## 2019-10-10 PROCEDURE — 36415 COLL VENOUS BLD VENIPUNCTURE: CPT

## 2019-10-10 PROCEDURE — 3017F COLORECTAL CA SCREEN DOC REV: CPT | Performed by: FAMILY MEDICINE

## 2019-10-10 RX ORDER — BUPROPION HYDROCHLORIDE 150 MG/1
TABLET, EXTENDED RELEASE ORAL
Qty: 90 TABLET | Refills: 1 | Status: SHIPPED | OUTPATIENT
Start: 2019-10-10 | End: 2020-07-13

## 2019-10-10 RX ORDER — AMLODIPINE BESYLATE 10 MG/1
TABLET ORAL
Qty: 90 TABLET | Refills: 1 | Status: SHIPPED | OUTPATIENT
Start: 2019-10-10 | End: 2020-10-15 | Stop reason: SDUPTHER

## 2019-10-10 RX ORDER — ATORVASTATIN CALCIUM 40 MG/1
TABLET, FILM COATED ORAL
Qty: 90 TABLET | Refills: 1 | Status: SHIPPED | OUTPATIENT
Start: 2019-10-10 | End: 2020-07-13

## 2019-10-10 RX ORDER — METFORMIN HYDROCHLORIDE 500 MG/1
TABLET, EXTENDED RELEASE ORAL
Qty: 180 TABLET | Refills: 1 | Status: SHIPPED | OUTPATIENT
Start: 2019-10-10 | End: 2020-07-13

## 2019-10-10 RX ORDER — CITALOPRAM 40 MG/1
TABLET ORAL
Qty: 90 TABLET | Refills: 1 | Status: SHIPPED | OUTPATIENT
Start: 2019-10-10 | End: 2020-07-13

## 2019-10-10 RX ORDER — CLOPIDOGREL BISULFATE 75 MG/1
TABLET ORAL
Qty: 90 TABLET | Refills: 1 | Status: SHIPPED | OUTPATIENT
Start: 2019-10-10 | End: 2020-07-13

## 2019-10-10 RX ORDER — METOPROLOL TARTRATE 100 MG/1
100 TABLET ORAL 2 TIMES DAILY
Qty: 180 TABLET | Refills: 1 | Status: SHIPPED | OUTPATIENT
Start: 2019-10-10 | End: 2020-09-08

## 2019-10-10 RX ORDER — TRAZODONE HYDROCHLORIDE 100 MG/1
TABLET ORAL
Qty: 90 TABLET | Refills: 1 | Status: SHIPPED | OUTPATIENT
Start: 2019-10-10 | End: 2020-08-05

## 2019-10-10 RX ORDER — GLIMEPIRIDE 4 MG/1
4 TABLET ORAL 2 TIMES DAILY WITH MEALS
Qty: 180 TABLET | Refills: 1 | Status: SHIPPED | OUTPATIENT
Start: 2019-10-10 | End: 2020-07-13

## 2019-10-10 RX ORDER — ISOSORBIDE MONONITRATE 30 MG/1
30 TABLET, EXTENDED RELEASE ORAL DAILY
Qty: 90 TABLET | Refills: 1 | Status: SHIPPED | OUTPATIENT
Start: 2019-10-10 | End: 2020-10-15 | Stop reason: SDUPTHER

## 2019-10-10 RX ORDER — LOSARTAN POTASSIUM 100 MG/1
TABLET ORAL
Qty: 90 TABLET | Refills: 1 | Status: SHIPPED | OUTPATIENT
Start: 2019-10-10 | End: 2020-10-15 | Stop reason: SDUPTHER

## 2019-10-10 ASSESSMENT — PATIENT HEALTH QUESTIONNAIRE - PHQ9
1. LITTLE INTEREST OR PLEASURE IN DOING THINGS: 0
SUM OF ALL RESPONSES TO PHQ QUESTIONS 1-9: 0
SUM OF ALL RESPONSES TO PHQ QUESTIONS 1-9: 0
SUM OF ALL RESPONSES TO PHQ9 QUESTIONS 1 & 2: 0
2. FEELING DOWN, DEPRESSED OR HOPELESS: 0

## 2019-10-10 ASSESSMENT — ENCOUNTER SYMPTOMS
WHEEZING: 0
EYES NEGATIVE: 1
GASTROINTESTINAL NEGATIVE: 1
ALLERGIC/IMMUNOLOGIC NEGATIVE: 1
SHORTNESS OF BREATH: 0

## 2019-11-13 ENCOUNTER — TELEPHONE (OUTPATIENT)
Dept: MAMMOGRAPHY | Age: 59
End: 2019-11-13

## 2019-11-13 DIAGNOSIS — Z12.31 ENCOUNTER FOR SCREENING MAMMOGRAM FOR BREAST CANCER: Primary | ICD-10-CM

## 2020-03-03 RX ORDER — RANOLAZINE 500 MG/1
TABLET, FILM COATED, EXTENDED RELEASE ORAL
Qty: 180 TABLET | Refills: 0 | Status: SHIPPED | OUTPATIENT
Start: 2020-03-03 | End: 2020-08-26 | Stop reason: SDUPTHER

## 2020-05-13 NOTE — TELEPHONE ENCOUNTER
Left message for patient to contact insurance as to what is covered or if we could get a copy of letter to see if there was an alternative

## 2020-05-27 ENCOUNTER — TELEPHONE (OUTPATIENT)
Dept: FAMILY MEDICINE CLINIC | Age: 60
End: 2020-05-27

## 2020-06-01 RX ORDER — RANOLAZINE 500 MG/1
500 TABLET, EXTENDED RELEASE ORAL 2 TIMES DAILY
Qty: 180 TABLET | Refills: 3 | Status: SHIPPED | OUTPATIENT
Start: 2020-06-01 | End: 2020-08-26

## 2020-07-13 RX ORDER — METFORMIN HYDROCHLORIDE 500 MG/1
TABLET, EXTENDED RELEASE ORAL
Qty: 180 TABLET | Refills: 0 | Status: SHIPPED | OUTPATIENT
Start: 2020-07-13 | End: 2020-10-15 | Stop reason: SDUPTHER

## 2020-07-13 RX ORDER — ATORVASTATIN CALCIUM 40 MG/1
TABLET, FILM COATED ORAL
Qty: 90 TABLET | Refills: 0 | Status: SHIPPED | OUTPATIENT
Start: 2020-07-13 | End: 2020-10-15 | Stop reason: SDUPTHER

## 2020-07-13 RX ORDER — BUPROPION HYDROCHLORIDE 150 MG/1
TABLET, EXTENDED RELEASE ORAL
Qty: 90 TABLET | Refills: 0 | Status: SHIPPED | OUTPATIENT
Start: 2020-07-13 | End: 2020-10-15 | Stop reason: SDUPTHER

## 2020-07-13 RX ORDER — CITALOPRAM 40 MG/1
TABLET ORAL
Qty: 90 TABLET | Refills: 0 | Status: SHIPPED | OUTPATIENT
Start: 2020-07-13 | End: 2020-10-15 | Stop reason: SDUPTHER

## 2020-07-13 RX ORDER — CLOPIDOGREL BISULFATE 75 MG/1
TABLET ORAL
Qty: 90 TABLET | Refills: 0 | Status: SHIPPED | OUTPATIENT
Start: 2020-07-13 | End: 2020-10-15 | Stop reason: SDUPTHER

## 2020-07-13 RX ORDER — GLIMEPIRIDE 4 MG/1
TABLET ORAL
Qty: 180 TABLET | Refills: 0 | Status: SHIPPED | OUTPATIENT
Start: 2020-07-13 | End: 2021-02-09

## 2020-08-05 RX ORDER — TRAZODONE HYDROCHLORIDE 100 MG/1
TABLET ORAL
Qty: 90 TABLET | Refills: 0 | Status: SHIPPED | OUTPATIENT
Start: 2020-08-05 | End: 2020-10-15 | Stop reason: SDUPTHER

## 2020-08-26 ENCOUNTER — OFFICE VISIT (OUTPATIENT)
Dept: CARDIOLOGY | Age: 60
End: 2020-08-26
Payer: MEDICARE

## 2020-08-26 ENCOUNTER — HOSPITAL ENCOUNTER (OUTPATIENT)
Dept: LAB | Age: 60
Discharge: HOME | End: 2020-08-26
Payer: MEDICARE

## 2020-08-26 VITALS
BODY MASS INDEX: 34.93 KG/M2 | SYSTOLIC BLOOD PRESSURE: 120 MMHG | HEART RATE: 68 BPM | DIASTOLIC BLOOD PRESSURE: 67 MMHG | HEIGHT: 61 IN | WEIGHT: 185 LBS

## 2020-08-26 LAB
ANION GAP SERPL CALCULATED.3IONS-SCNC: 8 MMOL/L (ref 9–17)
BUN BLDV-MCNC: 18 MG/DL (ref 6–20)
BUN/CREAT BLD: 14 (ref 9–20)
CALCIUM SERPL-MCNC: 9 MG/DL (ref 8.6–10.4)
CHLORIDE BLD-SCNC: 101 MMOL/L (ref 98–107)
CO2: 28 MMOL/L (ref 20–31)
CREAT SERPL-MCNC: 1.26 MG/DL (ref 0.5–0.9)
GFR AFRICAN AMERICAN: 53 ML/MIN
GFR NON-AFRICAN AMERICAN: 43 ML/MIN
GFR SERPL CREATININE-BSD FRML MDRD: ABNORMAL ML/MIN/{1.73_M2}
GFR SERPL CREATININE-BSD FRML MDRD: ABNORMAL ML/MIN/{1.73_M2}
GLUCOSE BLD-MCNC: 301 MG/DL (ref 70–99)
HCT VFR BLD CALC: 32.8 % (ref 36.3–47.1)
HEMOGLOBIN: 11.2 G/DL (ref 11.9–15.1)
MCH RBC QN AUTO: 31.9 PG (ref 25.2–33.5)
MCHC RBC AUTO-ENTMCNC: 34.1 G/DL (ref 25.2–33.5)
MCV RBC AUTO: 93.4 FL (ref 82.6–102.9)
NRBC AUTOMATED: 0 PER 100 WBC
PDW BLD-RTO: 13.3 % (ref 11.8–14.4)
PLATELET # BLD: 240 K/UL (ref 138–453)
PMV BLD AUTO: 10.5 FL (ref 8.1–13.5)
POTASSIUM SERPL-SCNC: 4.3 MMOL/L (ref 3.7–5.3)
RBC # BLD: 3.51 M/UL (ref 3.95–5.11)
SODIUM BLD-SCNC: 137 MMOL/L (ref 135–144)
WBC # BLD: 7.7 K/UL (ref 3.5–11.3)

## 2020-08-26 PROCEDURE — 93010 ELECTROCARDIOGRAM REPORT: CPT | Performed by: INTERNAL MEDICINE

## 2020-08-26 PROCEDURE — 36415 COLL VENOUS BLD VENIPUNCTURE: CPT

## 2020-08-26 PROCEDURE — G8417 CALC BMI ABV UP PARAM F/U: HCPCS | Performed by: INTERNAL MEDICINE

## 2020-08-26 PROCEDURE — 3017F COLORECTAL CA SCREEN DOC REV: CPT | Performed by: INTERNAL MEDICINE

## 2020-08-26 PROCEDURE — G8427 DOCREV CUR MEDS BY ELIG CLIN: HCPCS | Performed by: INTERNAL MEDICINE

## 2020-08-26 PROCEDURE — 85027 COMPLETE CBC AUTOMATED: CPT

## 2020-08-26 PROCEDURE — 80048 BASIC METABOLIC PNL TOTAL CA: CPT

## 2020-08-26 PROCEDURE — 1036F TOBACCO NON-USER: CPT | Performed by: INTERNAL MEDICINE

## 2020-08-26 PROCEDURE — 99214 OFFICE O/P EST MOD 30 MIN: CPT | Performed by: INTERNAL MEDICINE

## 2020-08-26 PROCEDURE — 93005 ELECTROCARDIOGRAM TRACING: CPT | Performed by: INTERNAL MEDICINE

## 2020-08-26 PROCEDURE — 99214 OFFICE O/P EST MOD 30 MIN: CPT

## 2020-08-26 RX ORDER — RANOLAZINE 1000 MG/1
1000 TABLET, EXTENDED RELEASE ORAL 2 TIMES DAILY
Qty: 60 TABLET | Refills: 6 | Status: SHIPPED | OUTPATIENT
Start: 2020-08-26 | End: 2022-01-03

## 2020-08-26 NOTE — PATIENT INSTRUCTIONS
Your Procedure Will Be Scheduled at:      [facility]    [address] [place], [address]   (located across from [facility])    Located on the main floor of the [facility]. Report to our reception desk by the [employer]. Parking is free. Handicap parking is available by the main entrance. You may also park in [place] on Level 2 and enter building on the bridge to [facility]. Take elevator to the main floor. · Our  will call you to schedule your procedure within a week. If you do not receive a phone call, please call the  directly at [phone] and leave a message, or call [place] office at [phone]. Date:______________________________    Arrival Time:________________________    Procedure Time:_____________________    Instructions:_____________________________      · Bring Photo I.D. and insurance cards. · Bring all Medications in the bottles. · Pack an overnight bag in case you are required to spend the night. · You will need someone to drive you home. · The  will instruct you on holding food and drink the night before or morning of your procedure. · You are to take your Medications, along with your Cardiac and/or Blood Pressure Medications, with small sips of water on the morning of your Procedure, unless instructed otherwise by your Physician. · If you need additional directions please call [phone]. · If you have any questions please feel free to call the [place] office at [phone]. Patient Education        Left Heart Catheterization: About This Test  What is it? Left heart catheterization is a test to check the left side of your heart. Your doctor might look at the shape of your heart, the motion of your heart, or the blood pressure inside the chambers. Why is this test done?   This test gives information about how your heart is working. It can:  · Check blood flow and blood pressure in the chambers of the heart. · Check the pumping action of the heart. · Find out if a heart defect is present and how severe it is. · Find out how well the heart valves work. How is the test done? · You will get medicine to help you relax. · A thin tube called a catheter is put into a blood vessel in the groin or the arm. The doctor moves the catheter through the blood vessel into your heart. · You will get a shot to numb the skin where the catheter goes in. · Dye may be injected into your heart. Your doctor can watch on special monitors as the dye moves in your heart. The dye helps your doctor see blood flow in your heart. · If a heart defect is found, cardiac catheterization sometimes is used to correct it during the test.  · You will stay in a room for at least a few hours to make sure the catheter site starts to heal. You may have a bandage or a compression device on your groin or arm to prevent bleeding. · If the catheter was placed in your groin, you may lie in bed for a few hours. If the catheter was put in your arm, you will need to keep your arm still for at least 1 hour. How long does it take? The test will take about 30 minutes. If a problem is found and the doctor treats it, the test can take a few hours longer. What happens after the test?  · You may or may not need to stay in the hospital overnight. You will get more instructions for what to do at home. · Drink plenty of fluids for several hours after the test.  Follow-up care is a key part of your treatment and safety. Be sure to make and go to all appointments, and call your doctor if you are having problems. It's also a good idea to know your test results and keep a list of the medicines you take. Where can you learn more? Go to [email or web address] and sign in to your [employer] account.  Enter [id number] in the ChipX box to learn more about \"Left Heart Catheterization: About This Test.\"     If you do not have an account, please click on the \"Sign Up Now\" link. Current as of: December 16, 2019               Content Version: 12.5  © 6409-0212 Health[employer], Incorporated. Care instructions adapted under license by [facility] ([facility]). If you have questions about a medical condition or this instruction, always ask your healthcare professional. [address] any warranty or liability for your use of this information.

## 2020-08-26 NOTE — PROGRESS NOTES
Today's Date: 2020  Patient's Name: Natalie Longo  Patient's age: 62 y.o., 1960    Subjective:  Natalie Longo  is here for follow up. She reports chest pain on the left side radiating to left neck few times a week. This happens with and without exertion. She denies any dyspnea. Past Medical History:   has a past medical history of Anxiety, Coronary artery disease, Depression, Diabetes mellitus, type 2 ([address] Examination of participant in clinical trial, Gastroesophageal reflux disease, Hyperlipidemia, Hypertension, Hypomagnesemia, Insomnia, Normal left ventricular systolic function, Obesity, Obstructive sleep apnea, and Sciatic nerve disease. Past Surgical History:   has a past surgical history that includes Hysterectomy (); Cardiac catheterization (2011); Cardiac catheterization (2008); Cardiac catheterization (2009); fracture surgery (Right, 10/1998); Upper gastrointestinal endoscopy (2005); Cardiac catheterization (2013);  section ();  section (1985); Finger trigger release (Left, 2015); Coronary angioplasty (2009); Coronary angioplasty (2013); Breast biopsy (Right, 9-15-14); Breast lumpectomy (Right, 10-1-2014); Cholecystectomy (); and Mouth surgery. Home Medications:  Prior to Admission medications    Medication Sig Start Date End Date Taking?  Authorizing Provider   traZODone (DESYREL) 100 MG tablet Take 1 tablet by mouth nightly 20   Melodie Estrella MD   buPROPion [facility] SR) 150 MG extended release tablet Take 1 tablet by mouth once daily 20   Melodie Estrella MD   glimepiride (AMARYL) 4 MG tablet TAKE 1 TABLET BY MOUTH TWICE DAILY WITH MEALS 20   Melodie Estrella MD   citalopram (CELEXA) 40 MG tablet Take 1 tablet by mouth once daily 20   Melodie Estrella MD   clopidogrel (PLAVIX) 75 MG tablet Take 1 tablet by mouth once daily 20   Melodie Estrella MD   atorvastatin (LIPITOR) 40 MG tablet Take 1 tablet by mouth once daily 7/13/20   Jaime West MD   metFORMIN (GLUCOPHAGE-XR) 500 MG extended release tablet Take 1 tablet by mouth twice daily 7/13/20   Jaime West MD   ranolazine (RANEXA) 500 MG extended release tablet Take 1 tablet by mouth 2 times daily 6/1/20   Jaime West MD   RANEXA 500 MG extended release tablet Take 1 tablet by mouth twice daily 3/3/20   Jiame West MD   linagliptin (TRADJENTA) 5 MG tablet TAKE 1 TABLET BY MOUTH ONCE DAILY 10/10/19   Jaime West MD   amLODIPine (NORVASC) 10 MG tablet TAKE 1 TABLET BY MOUTH ONCE DAILY 10/10/19   Jaime West MD   isosorbide mononitrate (IMDUR) 30 MG extended release tablet Take 1 tablet by mouth daily 10/10/19   Jaime West MD   losartan (COZAAR) 100 MG tablet TAKE ONE TABLET BY MOUTH ONCE DAILY 10/10/19   Jaime West MD   metoprolol (LOPRESSOR) 100 MG tablet Take 1 tablet by mouth 2 times daily 10/10/19   Jaime West MD   nitroGLYCERIN (NITROSTAT) 0.4 MG SL tablet Place 1 tablet under the tongue every 5 minutes as needed for Chest pain 10/9/19   Meryle Ely, MD   Blood Glucose Monitoring Suppl MICHELLE One glucometer to test 2 times a day and prn Type II diabetes uncontrolled not on insulin 6/11/18   Jaime West MD   Glucose Blood (BLOOD GLUCOSE TEST STRIPS) STRP Test 2 times a day and prn. Uncontrolled type 2 diabetes not on insulin. 6/11/18   Jaime West MD   Lancets [facility] Lancet device and lancets. Test 2 times an day and prn. Type II Diabetes uncontrolled not on insulin 6/11/18   Jaime West MD   Blood Glucose Monitoring Suppl Supplies MISC 1 Glucometer, test strips x 100 with 5 refills, 1 lancet device. Patient to test BID and prn. 7/3/17 10/9/19  Jaime West MD   LORazepam (ATIVAN) 0.5 MG tablet TAKE ONE TABLET BY MOUTH AT BEDTIME AS NEEDED 5/19/17   Molly Smart,    aspirin 81 MG tablet Take 81 mg by mouth daily.     Historical Provider, MD       Allergies:  Codeine and Morphine    Social History: delay or bruit   Abdomen:   · soft  · Bowel sounds present  Extremities:  ·  No edema  Neurological:  · Alert and oriented. Cardiac Data:  EKG: NSR, NL ECG    Labs:     CBC: No results for input(s): WBC, HGB, HCT, PLT in the last 72 hours. BMP: No results for input(s): NA, K, CO2, BUN, CREATININE, LABGLOM, GLUCOSE in the last 72 hours. PT/INR: No results for input(s): PROTIME, INR in the last 72 hours. FASTING LIPID PANEL:  Lab Results   Component Value Date    HDL 52 10/10/2019    TRIG 74 10/10/2019     LIVER PROFILE:No results for input(s): AST, ALT, LABALBU in the last 72 hours. Cardiac cath 8/16/18:   Patent distal LAD and proximal Ramus stents.   Otherwise, non-obstructive CAD.   Normal LV systolic function.     Assessment / Acute Cardiac Problems:      1-CAD: with multiple PCIs in the past last one 04/2013: Nuclear stress test 02/2016. Cardiac cath 8/16/18 for recurrent chest pain showed patent distal LAD and proximal ramus stents with otherwise non-obstructive CAD. Having recurrent angina  2-HTN: well controlled. 3-HLP: on statin. LDL 65 on 10/10/19  4-Preserved LV systolic function. 5-Atypical non-cardiac musculoskeletal chest pain   6-Recurrent palpitations now resolved with event monitor 06/2016 showed NSR with rare short runs of PATs. [id number] per primary MD     Plan of Treatment:      1-Continue current medical treatment with ASA, Plavix, BB, ARB, CCB, Nitrates, Ranexa and statin. Increase ranexa 1000mg po BID. 2-Aggressive risk factors modifications. 3-Diet, exercise and loose weight. 4-F/U in 6 months.       Kalani Batista [facility]           [phone]

## 2020-09-08 RX ORDER — METOPROLOL TARTRATE 100 MG/1
TABLET ORAL
Qty: 180 TABLET | Refills: 0 | Status: SHIPPED | OUTPATIENT
Start: 2020-09-08 | End: 2021-04-30 | Stop reason: SDUPTHER

## 2020-09-08 NOTE — TELEPHONE ENCOUNTER
Srini England called requesting a refill of the below medication which has been pended for you:     Requested Prescriptions     Pending Prescriptions Disp Refills    metoprolol (LOPRESSOR) 100 MG tablet [Pharmacy Med Name: Metoprolol Tartrate 100 MG Oral Tablet] 180 tablet 0     Sig: Take 1 tablet by mouth twice daily       Last Appointment Date: 7/7/2020  Next Appointment Date: 9/30/2020    Allergies   Allergen Reactions    Codeine Nausea And Vomiting    Morphine Nausea And Vomiting

## 2020-09-22 ENCOUNTER — HOSPITAL ENCOUNTER (OUTPATIENT)
Dept: CARDIAC CATH/INVASIVE PROCEDURES | Age: 60
Discharge: HOME | End: 2020-09-23
Attending: INTERNAL MEDICINE | Admitting: INTERNAL MEDICINE
Payer: MEDICARE

## 2020-09-22 LAB — GLUCOSE BLD-MCNC: 254 MG/DL (ref 65–105)

## 2020-09-22 PROCEDURE — C1725 CATH, TRANSLUMIN NON-LASER: HCPCS

## 2020-09-22 PROCEDURE — 7100000001 HC PACU RECOVERY - ADDTL 15 MIN

## 2020-09-22 PROCEDURE — C1760 CLOSURE DEV, VASC: HCPCS

## 2020-09-22 PROCEDURE — 6370000000 HC RX 637 (ALT 250 FOR IP): Performed by: STUDENT IN AN ORGANIZED HEALTH CARE EDUCATION/TRAINING PROGRAM

## 2020-09-22 PROCEDURE — 7100000000 HC PACU RECOVERY - FIRST 15 MIN

## 2020-09-22 PROCEDURE — 2580000003 HC RX 258: Performed by: INTERNAL MEDICINE

## 2020-09-22 PROCEDURE — C1769 GUIDE WIRE: HCPCS

## 2020-09-22 PROCEDURE — C1874 STENT, COATED/COV W/DEL SYS: HCPCS

## 2020-09-22 PROCEDURE — 93458 L HRT ARTERY/VENTRICLE ANGIO: CPT | Performed by: INTERNAL MEDICINE

## 2020-09-22 PROCEDURE — 2580000003 HC RX 258: Performed by: STUDENT IN AN ORGANIZED HEALTH CARE EDUCATION/TRAINING PROGRAM

## 2020-09-22 PROCEDURE — 6360000002 HC RX W HCPCS

## 2020-09-22 PROCEDURE — C1894 INTRO/SHEATH, NON-LASER: HCPCS

## 2020-09-22 PROCEDURE — 6360000004 HC RX CONTRAST MEDICATION

## 2020-09-22 PROCEDURE — C1887 CATHETER, GUIDING: HCPCS

## 2020-09-22 PROCEDURE — C9600 PERC DRUG-EL COR STENT SING: HCPCS | Performed by: INTERNAL MEDICINE

## 2020-09-22 PROCEDURE — 2500000003 HC RX 250 WO HCPCS

## 2020-09-22 PROCEDURE — 82947 ASSAY GLUCOSE BLOOD QUANT: CPT

## 2020-09-22 PROCEDURE — 6370000000 HC RX 637 (ALT 250 FOR IP)

## 2020-09-22 PROCEDURE — 2500000003 HC RX 250 WO HCPCS: Performed by: STUDENT IN AN ORGANIZED HEALTH CARE EDUCATION/TRAINING PROGRAM

## 2020-09-22 PROCEDURE — 2709999900 HC NON-CHARGEABLE SUPPLY

## 2020-09-22 RX ORDER — SODIUM CHLORIDE 0.9 % (FLUSH) 0.9 %
10 SYRINGE (ML) INJECTION EVERY 12 HOURS SCHEDULED
Status: DISCONTINUED | OUTPATIENT
Start: 2020-09-22 | End: 2020-09-23 | Stop reason: HOSPADM

## 2020-09-22 RX ORDER — TRAZODONE HYDROCHLORIDE 100 MG/1
100 TABLET ORAL NIGHTLY
Status: DISCONTINUED | OUTPATIENT
Start: 2020-09-22 | End: 2020-09-23 | Stop reason: HOSPADM

## 2020-09-22 RX ORDER — SODIUM CHLORIDE 9 MG/ML
INJECTION, SOLUTION INTRAVENOUS CONTINUOUS
Status: DISCONTINUED | OUTPATIENT
Start: 2020-09-22 | End: 2020-09-22

## 2020-09-22 RX ORDER — AMLODIPINE BESYLATE 10 MG/1
10 TABLET ORAL DAILY
Status: DISCONTINUED | OUTPATIENT
Start: 2020-09-22 | End: 2020-09-23 | Stop reason: HOSPADM

## 2020-09-22 RX ORDER — LABETALOL HYDROCHLORIDE 5 MG/ML
10 INJECTION, SOLUTION INTRAVENOUS EVERY 30 MIN PRN
Status: DISCONTINUED | OUTPATIENT
Start: 2020-09-22 | End: 2020-09-23 | Stop reason: HOSPADM

## 2020-09-22 RX ORDER — ACETAMINOPHEN 325 MG/1
650 TABLET ORAL EVERY 4 HOURS PRN
Status: DISCONTINUED | OUTPATIENT
Start: 2020-09-22 | End: 2020-09-23 | Stop reason: HOSPADM

## 2020-09-22 RX ORDER — CLOPIDOGREL BISULFATE 75 MG/1
75 TABLET ORAL DAILY
Status: DISCONTINUED | OUTPATIENT
Start: 2020-09-22 | End: 2020-09-23 | Stop reason: HOSPADM

## 2020-09-22 RX ORDER — ATORVASTATIN CALCIUM 40 MG/1
40 TABLET, FILM COATED ORAL DAILY
Status: DISCONTINUED | OUTPATIENT
Start: 2020-09-22 | End: 2020-09-23 | Stop reason: HOSPADM

## 2020-09-22 RX ORDER — SODIUM CHLORIDE 0.9 % (FLUSH) 0.9 %
10 SYRINGE (ML) INJECTION PRN
Status: DISCONTINUED | OUTPATIENT
Start: 2020-09-22 | End: 2020-09-23 | Stop reason: HOSPADM

## 2020-09-22 RX ORDER — RANOLAZINE 500 MG/1
1000 TABLET, EXTENDED RELEASE ORAL 2 TIMES DAILY
Status: DISCONTINUED | OUTPATIENT
Start: 2020-09-22 | End: 2020-09-23 | Stop reason: HOSPADM

## 2020-09-22 RX ORDER — LOSARTAN POTASSIUM 50 MG/1
100 TABLET ORAL DAILY
Status: DISCONTINUED | OUTPATIENT
Start: 2020-09-22 | End: 2020-09-23

## 2020-09-22 RX ORDER — ASPIRIN 81 MG/1
81 TABLET, CHEWABLE ORAL DAILY
Status: DISCONTINUED | OUTPATIENT
Start: 2020-09-22 | End: 2020-09-23

## 2020-09-22 RX ORDER — METOPROLOL TARTRATE 50 MG/1
50 TABLET, FILM COATED ORAL 2 TIMES DAILY
Status: DISCONTINUED | OUTPATIENT
Start: 2020-09-22 | End: 2020-09-23 | Stop reason: HOSPADM

## 2020-09-22 RX ADMIN — RANOLAZINE 1000 MG: 500 TABLET, FILM COATED, EXTENDED RELEASE ORAL at 21:11

## 2020-09-22 RX ADMIN — LOSARTAN POTASSIUM 100 MG: 50 TABLET, FILM COATED ORAL at 21:11

## 2020-09-22 RX ADMIN — DESMOPRESSIN ACETATE 40 MG: 0.2 TABLET ORAL at 21:11

## 2020-09-22 RX ADMIN — SODIUM CHLORIDE, PRESERVATIVE FREE 10 ML: 5 INJECTION INTRAVENOUS at 21:11

## 2020-09-22 RX ADMIN — AMLODIPINE BESYLATE 10 MG: 10 TABLET ORAL at 21:10

## 2020-09-22 RX ADMIN — ASPIRIN 81 MG: 81 TABLET, CHEWABLE ORAL at 21:11

## 2020-09-22 RX ADMIN — CLOPIDOGREL 75 MG: 75 TABLET, FILM COATED ORAL at 21:11

## 2020-09-22 RX ADMIN — SODIUM CHLORIDE: 9 INJECTION, SOLUTION INTRAVENOUS at 13:51

## 2020-09-22 RX ADMIN — Medication 10 MG: at 19:02

## 2020-09-22 RX ADMIN — METOPROLOL TARTRATE 50 MG: 50 TABLET, FILM COATED ORAL at 21:11

## 2020-09-22 NOTE — PROGRESS NOTES
Received post procedure to [facility] to room 11. Assessment obtained. Restrictions reviewed with patient. Post procedure pathway initiated. Right femoral site site soft , band aid dry and intact. No hematoma noted. Family at side. Patient without complaints. Head of bed flat with right leg straight.

## 2020-09-22 NOTE — H&P
[facility]  Procedure History and Physical Update          Patient Name: Alissa Roth  MRN:    [id number]  YOB: 1960  Date of evaluation:  9/22/2020    Procedure:    Cardiac cath +/- PCI    Indication for procedure:  Recurrent chest pain     Please refer to the office note completed by Dr. Jorge Luis Xiao on 8/26/2020 in the medical record and note that:    [x] I have examined the patient and reviewed the H&P/Consult and there are no changes to be made to the assessment or plan. [] I have examined the patient and reviewed the H&P/Consult and have noted the following changes:    1-CAD: with multiple PCIs in the past last one 04/2013: Nuclear stress test 02/2016. Cardiac cath 8/16/18 for recurrent chest pain showed patent distal LAD and proximal ramus stents with otherwise non-obstructive CAD. Having recurrent angina  2-HTN: well controlled. 3-HLP: on statin. LDL 65 on 10/10/19  4-Preserved LV systolic function. 5-Atypical non-cardiac musculoskeletal chest pain   6-Recurrent palpitations now resolved with event monitor 06/2016 showed NSR with rare short runs of PATs. [id number] per primary MD    Past Medical History:   Diagnosis Date    Anxiety     Coronary artery disease     with history of multiple chest pain episodes, MI ruled out, with stents placed in 2008, 2009, and 2011 to the ramus artery, PTCA only to small diagonal.    Depression     with chronic dysthymia, prior intentional overdose on Ambien and Phenergan, history of agoraphobia symptoms.  Diabetes mellitus, type 2 ([address]     adult onset.      Examination of participant in clinical trial 12/11/14    End date 12/11/14    Gastroesophageal reflux disease     Hyperlipidemia     Hypertension     Hypomagnesemia     Insomnia     Normal left ventricular systolic function     Obesity     Obstructive sleep apnea     Sciatic nerve disease        Past Surgical History:   Procedure Laterality Date    BREAST BIOPSY Right 9-15-14    US guided -benign bx, mass in another location    BREAST LUMPECTOMY Right 10-1-2014    needle placement    CARDIAC CATHETERIZATION  2011    LAD with diffuse plaque disease, left circumflex with mild irregularities, RCA with diffuse plaque disease, status post drug eluting stent to the proximal ramus.  CARDIAC CATHETERIZATION  2008    LAD with minor proximal disease, distal smooth 50 to 60% stenosis, ramus intermedius with proximal 30 to 40% stenosis followed by a mid segment 90% stenosis, left circumflex was a relatively small vessel with ostial 50% stenosis, RCA was a large dominant vessel with mid segment irregularity and 30% stenosis, status post stent to the ramus intermedius.  CARDIAC CATHETERIZATION  2009    second stent placed just beyond the first stent in the ramus intermedius.  CARDIAC CATHETERIZATION  2013    widely patent prior ramus stents with high grade very distal small vessel LAD disease, underwent successful PCI/drug eluting stent of the LAD, otherwise nonobstructive and small branch vessel disease, normal LV systolic function. [facility]     SECTION  1985    CHOLECYSTECTOMY  1992    CORON[facility]LASTY  2009    [facility]    CORONARY ANGIOPLASTY  2013    LAD--stent    FINGER TRIGGER RELEASE Left 2015    FRACTURE SURGERY Right 10/1998    ORIF, elbow, supracondylar fracture.  HYSTERECTOMY      ovaries spared.  MOUTH SURGERY      teeth pulled for dentures    UPPER GASTROINTESTINAL ENDOSCOPY  2005    erythema and congestion in the antrum compatible with mild gastritis, polyps in the stomach body.         Family History   Problem Relation Age of Onset    Breast Cancer Mother 62    Hypertension Mother     Cataracts Mother     Diabetes Father     Heart Disease Father     Heart Attack Father     Glaucoma Neg Hx        Allergies   Allergen Reactions    Codeine Nausea And Vomiting    Morphine Nausea And Vomiting       Prior to Admission medications    Medication Sig Start Date End Date Taking? Authorizing Provider   metoprolol (LOPRESSOR) 100 MG tablet Take 1 tablet by mouth twice daily 9/8/20   Federica Marti MD   ranolazine (RANEXA) 1000 MG extended release tablet Take 1 tablet by mouth 2 times daily 8/26/20   Pau Saba MD   traZODone (DESYREL) 100 MG tablet Take 1 tablet by mouth nightly 8/5/20   Federica Marti MD   buPROPion [facility] SR) 150 MG extended release tablet Take 1 tablet by mouth once daily 7/13/20   Federica Marti MD   glimepiride (AMARYL) 4 MG tablet TAKE 1 TABLET BY MOUTH TWICE DAILY WITH MEALS 7/13/20   Federica Marti MD   citalopram (CELEXA) 40 MG tablet Take 1 tablet by mouth once daily 7/13/20   Federica Marti MD   clopidogrel (PLAVIX) 75 MG tablet Take 1 tablet by mouth once daily 7/13/20   Federica Marti MD   atorvastatin (LIPITOR) 40 MG tablet Take 1 tablet by mouth once daily 7/13/20   Federica Marti MD   metFORMIN (GLUCOPHAGE-XR) 500 MG extended release tablet Take 1 tablet by mouth twice daily 7/13/20   Federica Marti MD   linagliptin (TRADJENTA) 5 MG tablet TAKE 1 TABLET BY MOUTH ONCE DAILY 10/10/19   Federica Marti MD   amLODIPine (NORVASC) 10 MG tablet TAKE 1 TABLET BY MOUTH ONCE DAILY 10/10/19   Federica Marti MD   isosorbide mononitrate (IMDUR) 30 MG extended release tablet Take 1 tablet by mouth daily 10/10/19   Federica Marti MD   losartan (COZAAR) 100 MG tablet TAKE ONE TABLET BY MOUTH ONCE DAILY 10/10/19   Federica Marti MD   nitroGLYCERIN (NITROSTAT) 0.4 MG SL tablet Place 1 tablet under the tongue every 5 minutes as needed for Chest pain 10/9/19   Nicole Allen MD   Blood Glucose Monitoring Suppl MICHELLE One glucometer to test 2 times a day and prn Type II diabetes uncontrolled not on insulin 6/11/18   Federica Marti MD   Glucose Blood (BLOOD GLUCOSE TEST STRIPS) STRP Test 2 times a day and prn.  Uncontrolled type 2 diabetes not on insulin. 6/11/18   Chari Lee MD   Lancets MISC Lancet device and lancets. Test 2 times an day and prn. Type II Diabetes uncontrolled not on insulin 6/11/18   Chari Lee MD   LORazepam (ATIVAN) 0.5 MG tablet TAKE ONE TABLET BY MOUTH AT BEDTIME AS NEEDED 5/19/17   Marino García DO   aspirin 81 MG tablet Take 81 mg by mouth daily. Historical Provider, MD         There were no vitals filed for this visit. Constitutional and General Appearance:   alert, cooperative, no distress and appears stated age  [de-identified]:  · PERRL, EOMI  Respiratory:  · Normal excursion and expansion without use of accessory muscles  · Resp Auscultation:  Good respiratory effort. No for increased work of breathing. On auscultation: clear to auscultation bilaterally  Cardiovascular:  · Regular rate and rhythm. · S1/S2  · No murmurs. · The apical impulse is not displaced  Abdomen:  · Soft  · Bowel sounds present  · Non-tender to palpation  Extremities:  · No cyanosis or clubbing  · Lower extremity edema: No.  Skin:  · Warm and dry  Neurological:  · Alert and oriented. · Moves all extremities well      Plan:  · Proceed with planned procedure. · Further orders to follow. Risks, benefits, and alternatives of cardiac catheterization were discussed, in detail, with patient. Risks include, but not limited to, bleeding, requiring blood transfusion, vascular complication requiring surgery, renal failure with need of dialysis, CVA, MI, death and anesthesia complications including intubation were discussed. Patient verbalized understanding and agreed to proceed with the procedure understanding the above risks and alternatives to the procedure. Electronically signed on 09/22/20 at 8:49 AM by:    Suzi Padgett MD   Fellow, [address]    I performed a history and physical examination of the patient and discussed management with the resident.  I reviewed the residents note and agree with

## 2020-09-22 NOTE — OP NOTE
[facility]    CARDIAC CATHETERIZATION    Date:   9/22/2020  Patient name:  Karthik Galloway  Date of admission:  9/22/2020 12:46 PM  MRN:   [id number]  YOB: 1960    Operators:  Primary:   BERKLEY Bush MD (Attending Physician)      Procedure performed:     [x] Left Heart Catheterization. [] Graft Angiography. [x] Left Ventriculography. [] Right Heart Catheterization. [x] Coronary Angiography. [] Aortic Valve Studies. [x] PCI:      [] Other:       Pre Procedure Conscious Sedation Data:  ASA Class:    [] I [x] II [] III [] IV    Mallampati Class:  [] I [x] II [] III [] IV      Indication:  [] STEMI      [] + Stress test  [] ACS      [] + EKG Changes  [] Non Q MI       [] Significant Risk Factors  [x] Recurrent Angina             [] Diabetes Mellitus    [] New LBBB      [] Other.  [] CHF / Low EF changes     [] Abnormal CTA / Ca Score      Procedure:  Access:  [x] Femoral  [] Radial  artery       [x] Right  [] Left    Procedure: After informed consent was obtained with explanation of the risks and benefits, patient was brought to the cath lab. The access area was prepped and draped in sterile fashion. 1% lidocaine was used for local block. The artery was cannulated with 6  Fr sheath with brisk arterial blood return. The side port was frequently flushed and aspirated with normal saline. Estimated Blood Loss:  [x] Minimal < 25 cc [] Moderate 25-50 cc  [] >50 cc    Findings:  LMCA: Normal 0% stenosis. LAD: has proximal eccentric 75% stenosis, mid 40% stenosis. Apical LAD stent   is patent. The D1 is very small with 90% stenosis.       Lesion on Prox LAD: Proximal subsection. 75% stenosis 15 mm length reduced     to 0%. Pre procedure SARIAH III flow was noted. Post Procedure SARIAH III     flow was present. The lesion was diagnosed as Moderate Risk (B). The     lesion was discrete and eccentric. The lesion showed with irregular     contour, mild angulation and mild Prior CABG. [] Currently smoker. [] Cardiac Arrest outside of healthcare facility. [] Yes    [x] No        Witnessed     [] Yes   [] No     Arrest after arrival of EMS  [] Yes   [] No     [] Cardiac Arrest at other Facility. [] Yes   [x] No    Pre-Procedure Information. Heart Failure       [] Yes    [x] No        Class  [] I      [] II  [] III    [] IV. New Diagnosis    [] Yes  [] No    HF Type      [] Systolic   [] Diastolic          [] Unknown. Diagnostic Test:   EKG       [] Normal   [x] Abnormal    New antiarrhythmia medications:    [] Yes   [] No   New onset atrial fibrillation / Flutter     [] Yes   [] No   ECG Abnormalities:      [] V. Fib   [] Farhana V. Tach           [] NS V. T   [] New LBBB           [] T. Inv  []  ST dev > 0.5 mm         [] PVC's freq  [] PVC's infrequent    Stress Test Performed:      [] Yes    [x] No     Type:     [] Stress Echo   [] Exercise Stress Test (no imaging)      [] Stress Nuclear  [] Stress Imaging     Results   [] Negative   [] Positive        [] Indeterminate  [] Unavailable     If Positive/ Risk / Extent of Ischemia:       [] Low  [] Intermediate         [] High  [] Unavailable      Cardiac CTA Performed:     [] Yes    [x] No      Results   [] CAD   [] Non obstructive CAD      [] No CAD   [] Uncertain      [] Unknown   [] Structural Disease. Pre Procedure Medications:   [x] Yes    [] No         [x] ASA   [x] Beta Blockers      [] Nitrate   [] Ca Channel Blockers      [] Ranolazine   [x] Statin       [x] Plavix/Others antiplatelets      Electronically signed on 9/22/2020 at 3:47 PM by:    Farooq Mathew MD  Fellow, [address]    I was present during entire procedure and performed all critical elements of the procedure.     Moshe Joaquin MD

## 2020-09-22 NOTE — PROGRESS NOTES
Patient admitted, consent signed and questions answered. Patient ready for procedure. Call light to reach with side rails up 2 of 2. Bilat groin hairs clipped. Family at bedside with patient. History and physical needing update.

## 2020-09-23 VITALS
SYSTOLIC BLOOD PRESSURE: 132 MMHG | RESPIRATION RATE: 20 BRPM | DIASTOLIC BLOOD PRESSURE: 65 MMHG | WEIGHT: 192.68 LBS | OXYGEN SATURATION: 94 % | BODY MASS INDEX: 36.38 KG/M2 | HEART RATE: 66 BPM | HEIGHT: 61 IN | TEMPERATURE: 98.3 F

## 2020-09-23 LAB
ALBUMIN SERPL-MCNC: 3.5 G/DL (ref 3.5–5.2)
ALBUMIN/GLOBULIN RATIO: 1.4 (ref 1–2.5)
ALP BLD-CCNC: 76 U/L (ref 35–104)
ALT SERPL-CCNC: 17 U/L (ref 5–33)
ANION GAP SERPL CALCULATED.3IONS-SCNC: 8 MMOL/L (ref 9–17)
AST SERPL-CCNC: 13 U/L
BILIRUB SERPL-MCNC: 0.57 MG/DL (ref 0.3–1.2)
BUN BLDV-MCNC: 13 MG/DL (ref 6–20)
BUN/CREAT BLD: ABNORMAL (ref 9–20)
CALCIUM SERPL-MCNC: 8.5 MG/DL (ref 8.6–10.4)
CHLORIDE BLD-SCNC: 102 MMOL/L (ref 98–107)
CO2: 27 MMOL/L (ref 20–31)
CREAT SERPL-MCNC: 0.9 MG/DL (ref 0.5–0.9)
GFR AFRICAN AMERICAN: >60 ML/MIN
GFR NON-AFRICAN AMERICAN: >60 ML/MIN
GFR SERPL CREATININE-BSD FRML MDRD: ABNORMAL ML/MIN/{1.73_M2}
GFR SERPL CREATININE-BSD FRML MDRD: ABNORMAL ML/MIN/{1.73_M2}
GLUCOSE BLD-MCNC: 216 MG/DL (ref 70–99)
GLUCOSE BLD-MCNC: 311 MG/DL (ref 65–105)
HCT VFR BLD CALC: 34.6 % (ref 36.3–47.1)
HEMOGLOBIN: 11.3 G/DL (ref 11.9–15.1)
MCH RBC QN AUTO: 31 PG (ref 25.2–33.5)
MCHC RBC AUTO-ENTMCNC: 32.7 G/DL (ref 28.4–34.8)
MCV RBC AUTO: 95.1 FL (ref 82.6–102.9)
NRBC AUTOMATED: 0 PER 100 WBC
PDW BLD-RTO: 12.9 % (ref 11.8–14.4)
PLATELET # BLD: 211 K/UL (ref 138–453)
PMV BLD AUTO: 11.2 FL (ref 8.1–13.5)
POTASSIUM SERPL-SCNC: 4.5 MMOL/L (ref 3.7–5.3)
RBC # BLD: 3.64 M/UL (ref 3.95–5.11)
SODIUM BLD-SCNC: 137 MMOL/L (ref 135–144)
TOTAL PROTEIN: 6 G/DL (ref 6.4–8.3)
WBC # BLD: 10 K/UL (ref 3.5–11.3)

## 2020-09-23 PROCEDURE — 6370000000 HC RX 637 (ALT 250 FOR IP): Performed by: STUDENT IN AN ORGANIZED HEALTH CARE EDUCATION/TRAINING PROGRAM

## 2020-09-23 PROCEDURE — 2580000003 HC RX 258: Performed by: STUDENT IN AN ORGANIZED HEALTH CARE EDUCATION/TRAINING PROGRAM

## 2020-09-23 PROCEDURE — 36415 COLL VENOUS BLD VENIPUNCTURE: CPT

## 2020-09-23 PROCEDURE — 82947 ASSAY GLUCOSE BLOOD QUANT: CPT

## 2020-09-23 PROCEDURE — 85027 COMPLETE CBC AUTOMATED: CPT

## 2020-09-23 PROCEDURE — 80053 COMPREHEN METABOLIC PANEL: CPT

## 2020-09-23 RX ORDER — ASPIRIN 81 MG/1
81 TABLET ORAL DAILY
Status: DISCONTINUED | OUTPATIENT
Start: 2020-09-23 | End: 2020-09-23 | Stop reason: HOSPADM

## 2020-09-23 RX ORDER — NICOTINE POLACRILEX 4 MG
15 LOZENGE BUCCAL PRN
Status: DISCONTINUED | OUTPATIENT
Start: 2020-09-23 | End: 2020-09-23 | Stop reason: HOSPADM

## 2020-09-23 RX ORDER — LOSARTAN POTASSIUM 100 MG/1
100 TABLET ORAL DAILY
Status: DISCONTINUED | OUTPATIENT
Start: 2020-09-23 | End: 2020-09-23 | Stop reason: HOSPADM

## 2020-09-23 RX ORDER — DEXTROSE MONOHYDRATE 25 G/50ML
12.5 INJECTION, SOLUTION INTRAVENOUS PRN
Status: DISCONTINUED | OUTPATIENT
Start: 2020-09-23 | End: 2020-09-23 | Stop reason: HOSPADM

## 2020-09-23 RX ORDER — DEXTROSE MONOHYDRATE 50 MG/ML
100 INJECTION, SOLUTION INTRAVENOUS PRN
Status: DISCONTINUED | OUTPATIENT
Start: 2020-09-23 | End: 2020-09-23 | Stop reason: HOSPADM

## 2020-09-23 RX ADMIN — METOPROLOL TARTRATE 50 MG: 50 TABLET, FILM COATED ORAL at 11:49

## 2020-09-23 RX ADMIN — SODIUM CHLORIDE, PRESERVATIVE FREE 10 ML: 5 INJECTION INTRAVENOUS at 08:15

## 2020-09-23 RX ADMIN — RANOLAZINE 1000 MG: 500 TABLET, FILM COATED, EXTENDED RELEASE ORAL at 11:52

## 2020-09-23 RX ADMIN — INSULIN LISPRO 4 UNITS: 100 INJECTION, SOLUTION INTRAVENOUS; SUBCUTANEOUS at 11:32

## 2020-09-23 RX ADMIN — DESMOPRESSIN ACETATE 40 MG: 0.2 TABLET ORAL at 11:48

## 2020-09-23 RX ADMIN — CLOPIDOGREL 75 MG: 75 TABLET, FILM COATED ORAL at 11:48

## 2020-09-23 RX ADMIN — ASPIRIN 81 MG: 81 TABLET ORAL at 11:25

## 2020-09-23 RX ADMIN — LOSARTAN POTASSIUM 100 MG: 100 TABLET ORAL at 11:25

## 2020-09-23 RX ADMIN — AMLODIPINE BESYLATE 10 MG: 10 TABLET ORAL at 11:46

## 2020-09-23 NOTE — FLOWSHEET NOTE
Pt will leave floor ambulatory with daughter. All d/c info given to pt. All questions answered. Stent card and cardiac rehab given to pt. IV removed. No further questions.

## 2020-09-23 NOTE — CARE COORDINATION
Discharge [facility] Case Management Department  Written by: Omid Combs RN    Patient Name: Nina Roach  Attending Provider: No att. providers found  Admit Date: 2020  6:42 PM  MRN: [id number]  Account: [de-identified]                     : 1960  Discharge Date: 2020      Disposition: home    Omid Combs RN

## 2020-09-23 NOTE — PLAN OF CARE
Problem: Anxiety:  Goal: Level of anxiety will decrease  Description: Level of anxiety will decrease  Outcome: Completed

## 2020-09-23 NOTE — PLAN OF CARE
Problem: Anxiety:  Goal: Level of anxiety will decrease  Description: Level of anxiety will decrease  9/22/2020 2050 by Timothy Moses RN  Outcome: Ongoing  9/22/2020 1346 by Dora Clifford RN  Outcome: Met This Shift

## 2020-09-23 NOTE — PROGRESS NOTES
Paged Cards and made them aware pt is diabetic and takes mult diabetic medications at home. Cards placed orders of sliding scale. Sent pts meds to pharmacy to get verified. No swelling hematoma or bleeding at site, pulses are palpable. Pt is currently in no pain. Spent 25 min trying to make an appt for pt with pcp. No answer either time.  Will have pt call and f/u with pcp after discharge; looks like sept 30th there has been an appt made by someone else

## 2020-09-23 NOTE — DISCHARGE INSTR - DIET
Cardiac Diet (Low Na/Salt)    · Drink plenty of fluids to help your body flush out the dye. If you have kidney, heart, or liver disease and have to limit fluids, talk with your doctor before you increase the amount of fluids you drink. · Keep eating a heart-healthy diet that has lots of fruits, vegetables, and whole grains. If you have not been eating this way, talk to your doctor. You also may want to talk to a dietitian. This expert can help you to learn about healthy foods and plan meals.

## 2020-09-23 NOTE — DISCHARGE SUMMARY
[facility]  Discharge Note                 Name:  Kashmir Gomes  YOB: 1960  Social Security Number:  [id number]  Medical Record Number:  [id number]    Date of Admission:  9/22/2020  Date of Discharge:  9/23/2020    Admitting physician: Shai Jarquin MD    Discharge Attending: Aamir Mas CNP  Primary Care Physician: Jazmyne Alexander MD  Consultants: Cardiology  Discharge to [address] [place] [address] LIST:  Patient Active Problem List   Diagnosis    Coronary artery disease involving native coronary artery of native heart with angina pectoris ([facility] [address]    Uncontrolled type 2 diabetes mellitus, without long-term current use of insulin ([facility] [address]    Hyperlipidemia    Hypertension    Anxiety    Obstructive sleep apnea    Major depressive disorder in full remission ([facility] [address]    Gastroesophageal reflux disease    Hypomagnesemia    Obesity    Insomnia    Chest pain    Background diabetic retinopathy [facility])         Procedures:cardiac catheterization    HOSPITAL COURSE :           The patient was admitted for: Cardiac cath   Hospital Procedures if any:  Cardiac cath   Medications changes recommendation:  See list   Follow Up Plan:   2 weeks     CARDIAC CATH 9/22/20  Findings:  LMCA: Normal 0% stenosis. LAD: has proximal eccentric 75% stenosis, mid 40% stenosis. Apical LAD stent   is patent. The D1 is very small with 90% stenosis.       Lesion on Prox LAD: Proximal subsection. 75% stenosis 15 mm length reduced     to 0%. Pre procedure SARIAH III flow was noted. Post Procedure SARIAH III     flow was present. The lesion was diagnosed as Moderate Risk (B). The     lesion was discrete and eccentric. The lesion showed with irregular     contour, mild angulation and mild tortuosity.       Devices used         - Runthrough NS. Number of passes: 1.         - Trek Balloon 2.5mm x 12mm.  1 inflation(s) to a max pressure of: 12     fartun.         - Xience Roxane 3.25 x 15 ZINA. 1 inflation(s) to a max pressure of: 20     fartun.         - Emerge MR Balloon 3.5 x 15. 1 inflation(s) to a max pressure of: 16     fartun.       LCx: has a hairpin loop proximally. The OM1 has patent stent. The OM2 has   distal 95% stenosis. Unable to pass wire into OM2 due to hairpin loop. RCA: has 20% stenosis. The RPL has mid 99% stenosis and is a small vessel. Unable to pass wire across lesion. Possible local bridging collaterals.         Lesion on 1st RPL: Mid subsection. 95% stenosis 12 mm length reduced to     95%. Pre procedure SARIAH III flow was noted. Post Procedure SARIAH III flow     was present. Good runoff was present. The lesion was diagnosed as     Moderate Risk (B). The lesion was discrete and eccentric. The lesion     showed moderate angulation and severe tortuosity.       Devices used         - Whisper Wire 190 cm. Number of passes: 1.         - ASAHI Prowater Flex 180 cm. Number of passes: 1.          The LV gram was performed in the ZULUAGA 30 position. LVEF: 55%.       Conclusions:        Patent distal LAD stent.    Successful PTCA/ZINA of proximal LAD.    Unable to pass wire into OM2 due to hairpin loop.    Unable to cross RPL lesion, possible local bridging collaterals.         Recommendations:     Medical therapy for now for OM and RPL lesion. If recurrent symptoms, will    ask Dr. Ismael Segovia to review and attempt PCI.    Risk factor modification. Discharge exam:   Vitals:    09/23/20 0814   BP:    Pulse: 87   Resp:    Temp:    SpO2:      Neuro: normal  Chest: Clear to ausculation. No wheezing. Cardiac: Regular rate. s1 and s2 auscultated. No murmur noted. Abdomen/groin: soft, non-tender, without masses or organomegaly  Lower extremity edema: none. Right groin soft      Follow up with primary care provider 1 week  Follow up with cardiology 4 weeks  Follow up with other consultant physicians at their directions.     Discharge Medications:   Wendall Limb   Home Medication Instructions VPD:[id number]    Printed on:09/23/20 1145   Medication Information                      amLODIPine (NORVASC) 10 MG tablet  TAKE 1 TABLET BY MOUTH ONCE DAILY             aspirin 81 MG tablet  Take 81 mg by mouth daily. atorvastatin (LIPITOR) 40 MG tablet  Take 1 tablet by mouth once daily             Blood Glucose Monitoring Suppl MICHELLE  One glucometer to test 2 times a day and prn Type II diabetes uncontrolled not on insulin             buPROPion (WELLBUTRIN SR) 150 MG extended release tablet  Take 1 tablet by mouth once daily             citalopram (CELEXA) 40 MG tablet  Take 1 tablet by mouth once daily             clopidogrel (PLAVIX) 75 MG tablet  Take 1 tablet by mouth once daily             glimepiride (AMARYL) 4 MG tablet  TAKE 1 TABLET BY MOUTH TWICE DAILY WITH MEALS             Glucose Blood (BLOOD GLUCOSE TEST STRIPS) STRP  Test 2 times a day and prn. Uncontrolled type 2 diabetes not on insulin. isosorbide mononitrate (IMDUR) 30 MG extended release tablet  Take 1 tablet by mouth daily             Lancets MISC  Lancet device and lancets. Test 2 times an day and prn.  Type II Diabetes uncontrolled not on insulin             linagliptin (TRADJENTA) 5 MG tablet  TAKE 1 TABLET BY MOUTH ONCE DAILY             LORazepam (ATIVAN) 0.5 MG tablet  TAKE ONE TABLET BY MOUTH AT BEDTIME AS NEEDED             losartan (COZAAR) 100 MG tablet  TAKE ONE TABLET BY MOUTH ONCE DAILY             metFORMIN (GLUCOPHAGE-XR) 500 MG extended release tablet  Take 1 tablet by mouth twice daily             metoprolol (LOPRESSOR) 100 MG tablet  Take 1 tablet by mouth twice daily             nitroGLYCERIN (NITROSTAT) 0.4 MG SL tablet  Place 1 tablet under the tongue every 5 minutes as needed for Chest pain             ranolazine (RANEXA) 1000 MG extended release tablet  Take 1 tablet by mouth 2 times daily             traZODone (DESYREL) 100 MG tablet  Take 1 tablet by mouth nightly Coronary Discharge Core Measure: Please indicate the medication given by X, and if not the reasons not given:    Not Given Reason  Given      Beta Blockers X     On arb  ACE-I       Statins X      ASA X       OAP (Plavix/Effient/Brilinta) X    SL Nitro   X     Pt seen and examined. Pt resting in bed. She denies any chest pain or shortness of breath. Groin  site soft. Discussed with patient and nursing. Medications and discharge instructions reviewed with patient and nursing. Pt verbalizes understanding regarding medications and activity restrictions. Script for plavix given. Will follow up in 2 weeks in [place].           Electronically signed by SALVATORE Rivas CNP on 9/23/2020 at 11:41 AM  [facility]      [phone]

## 2020-09-24 ENCOUNTER — TELEPHONE (OUTPATIENT)
Dept: FAMILY MEDICINE CLINIC | Age: 60
End: 2020-09-24

## 2020-10-14 ENCOUNTER — OFFICE VISIT (OUTPATIENT)
Dept: CARDIOLOGY | Age: 60
End: 2020-10-14
Payer: MEDICARE

## 2020-10-14 VITALS
SYSTOLIC BLOOD PRESSURE: 138 MMHG | HEART RATE: 72 BPM | HEIGHT: 61 IN | DIASTOLIC BLOOD PRESSURE: 72 MMHG | BODY MASS INDEX: 34.74 KG/M2 | WEIGHT: 184 LBS

## 2020-10-14 PROCEDURE — G8417 CALC BMI ABV UP PARAM F/U: HCPCS | Performed by: INTERNAL MEDICINE

## 2020-10-14 PROCEDURE — G8484 FLU IMMUNIZE NO ADMIN: HCPCS | Performed by: INTERNAL MEDICINE

## 2020-10-14 PROCEDURE — G8427 DOCREV CUR MEDS BY ELIG CLIN: HCPCS | Performed by: INTERNAL MEDICINE

## 2020-10-14 PROCEDURE — 3017F COLORECTAL CA SCREEN DOC REV: CPT | Performed by: INTERNAL MEDICINE

## 2020-10-14 PROCEDURE — 99214 OFFICE O/P EST MOD 30 MIN: CPT | Performed by: INTERNAL MEDICINE

## 2020-10-14 PROCEDURE — 93005 ELECTROCARDIOGRAM TRACING: CPT | Performed by: INTERNAL MEDICINE

## 2020-10-14 PROCEDURE — 1036F TOBACCO NON-USER: CPT | Performed by: INTERNAL MEDICINE

## 2020-10-14 PROCEDURE — 99214 OFFICE O/P EST MOD 30 MIN: CPT

## 2020-10-14 PROCEDURE — 93010 ELECTROCARDIOGRAM REPORT: CPT | Performed by: INTERNAL MEDICINE

## 2020-10-14 NOTE — PROGRESS NOTES
Today's Date: 10/14/2020  Patient's Name: Annika Farley  Patient's age: 62 y.o., 1960    Subjective:  Annika Farley  is here for follow up after cardiac cath. She reports that chest pain has resolved. She reports feeling tired- which is chronic. She denies any dyspnea. No PND, no syncope or pre-syncope, no orthopnea. Past Medical History:   has a past medical history of Anxiety, Coronary artery disease, Depression, Diabetes mellitus, type 2 ([address] Examination of participant in clinical trial, Gastroesophageal reflux disease, Hyperlipidemia, Hypertension, Hypomagnesemia, Insomnia, Normal left ventricular systolic function, Obesity, Obstructive sleep apnea, and Sciatic nerve disease. Past Surgical History:   has a past surgical history that includes Hysterectomy (); fracture surgery (Right, 10/1998); Upper gastrointestinal endoscopy (2005);  section ();  section (1985); Finger trigger release (Left, 2015); Coronary angioplasty (2009); Coronary angioplasty (2013); Breast biopsy (Right, 9-15-14); Breast lumpectomy (Right, 10-1-2014); Cholecystectomy (); Mouth surgery; Cardiac catheterization (2018); Cardiac catheterization (2020); Coronary angioplasty with stent (2011); Coronary angioplasty with stent (2008); Coronary angioplasty with stent (2013); and Coronary angioplasty with stent (2009). Home Medications:  Prior to Admission medications    Medication Sig Start Date End Date Taking?  Authorizing Provider   metoprolol (LOPRESSOR) 100 MG tablet Take 1 tablet by mouth twice daily 20  Yes Malachy Krabbe, MD   ranolazine (RANEXA) 1000 MG extended release tablet Take 1 tablet by mouth 2 times daily 20  Yes Be Rivas MD   traZODone (DESYREL) 100 MG tablet Take 1 tablet by mouth nightly 20  Yes Malachy Krabbe, MD   buPROPion [facility] SR) 150 MG extended release tablet Take 1 tablet by mouth once daily 7/13/20  Yes Danny Casanova MD   glimepiride (AMARYL) 4 MG tablet TAKE 1 TABLET BY MOUTH TWICE DAILY WITH MEALS 7/13/20  Yes Danny Casanova MD   citalopram (CELEXA) 40 MG tablet Take 1 tablet by mouth once daily 7/13/20  Yes Danny Casanova MD   clopidogrel (PLAVIX) 75 MG tablet Take 1 tablet by mouth once daily 7/13/20  Yes Danny Casanova MD   atorvastatin (LIPITOR) 40 MG tablet Take 1 tablet by mouth once daily 7/13/20  Yes Danny Casanova MD   metFORMIN (GLUCOPHAGE-XR) 500 MG extended release tablet Take 1 tablet by mouth twice daily 7/13/20  Yes Danny Casanova MD   linagliptin (TRADJENTA) 5 MG tablet TAKE 1 TABLET BY MOUTH ONCE DAILY 10/10/19  Yes Danny Casanova MD   amLODIPine (NORVASC) 10 MG tablet TAKE 1 TABLET BY MOUTH ONCE DAILY 10/10/19  Yes Danny Casanova MD   isosorbide mononitrate (IMDUR) 30 MG extended release tablet Take 1 tablet by mouth daily 10/10/19  Yes Danny Casanova MD   losartan (COZAAR) 100 MG tablet TAKE ONE TABLET BY MOUTH ONCE DAILY 10/10/19  Yes Danny Casanova MD   nitroGLYCERIN (NITROSTAT) 0.4 MG SL tablet Place 1 tablet under the tongue every 5 minutes as needed for Chest pain 10/9/19  Yes Marylee Borg, MD   Blood Glucose Monitoring Suppl MICHELLE One glucometer to test 2 times a day and prn Type II diabetes uncontrolled not on insulin 6/11/18  Yes Danny Casanova MD   Glucose Blood (BLOOD GLUCOSE TEST STRIPS) STRP Test 2 times a day and prn. Uncontrolled type 2 diabetes not on insulin. 6/11/18  Yes Danny Casanova MD   Lancets MISC Lancet device and lancets. Test 2 times an day and prn. Type II Diabetes uncontrolled not on insulin 6/11/18  Yes Danny Casanova MD   LORazepam (ATIVAN) 0.5 MG tablet TAKE ONE TABLET BY MOUTH AT BEDTIME AS NEEDED 5/19/17  Yes Suzi Parekh,    aspirin 81 MG tablet Take 81 mg by mouth daily. Yes Historical Provider, MD       Allergies:  Codeine and Morphine    Social History:   reports that she has never smoked.  She has never used smokeless tobacco. She reports that she does not drink alcohol or use drugs. Family History: family history includes Breast Cancer (age of onset: 62) in her mother; Cataracts in her mother; Diabetes in her father; Heart Attack in her father; Heart Disease in her father; Hypertension in her mother. No h/o sudden cardiac death. No for premature CAD    REVIEW OF SYSTEMS:    · Constitutional: there has been no unanticipated weight loss. There's been No change in energy level, No change in activity level. · Eyes: No visual changes or diplopia. No scleral icterus. · ENT: No Headaches, hearing loss or vertigo. No mouth sores or sore throat. · Cardiovascular: see above  · Respiratory: see above  · Gastrointestinal: No abdominal pain, appetite loss, blood in stools. · Genitourinary: No dysuria, trouble voiding, or hematuria. · Musculoskeletal:  No gait disturbance, No weakness or joint complaints. · Integumentary: No rash or pruritis. · Neurological: No headache or diplopia. No tingling  · Psychiatric: No anxiety, or depression. · Endocrine: No temperature intolerance. · Hematologic/Lymphatic: No abnormal bruising or bleeding, blood clots or swollen lymph nodes. · Allergic/Immunologic: No nasal congestion or hives. PHYSICAL EXAM:      Ht 5' 1\" (1.549 m)   Wt 184 lb (83.5 kg)   LMP  (LMP Unknown)   BMI 34.77 kg/m²    Constitutional and General Appearance: alert, cooperative, no distress and appears stated age  [de-identified]: PERRL, no cervical lymphadenopathy. No masses palpable. Normal oral mucosa  Respiratory:  · Normal excursion and expansion without use of accessory muscles  · Resp Auscultation: Good respiratory effort. No for increased work of breathing.  On auscultation: clear to auscultation bilaterally  Cardiovascular:  · Heart tones are crisp and normal. regular S1 and S2.  · Jugular venous pulsation Normal  · The carotid upstroke is normal in amplitude and contour without delay or bruit   Abdomen:   · soft  · Bowel sounds present  Extremities:  ·  No edema  Neurological:  · Alert and oriented. Cardiac Data:  EKG: NSR, NL ECG    Labs:     CBC: No results for input(s): WBC, HGB, HCT, PLT in the last 72 hours. BMP: No results for input(s): NA, K, CO2, BUN, CREATININE, LABGLOM, GLUCOSE in the last 72 hours. PT/INR: No results for input(s): PROTIME, INR in the last 72 hours. FASTING LIPID PANEL:  Lab Results   Component Value Date    HDL 52 10/10/2019    TRIG 74 10/10/2019     LIVER PROFILE:No results for input(s): AST, ALT, LABALBU in the last 72 hours. Cardiac cath 8/16/18:   Patent distal LAD and proximal Ramus stents.   Otherwise, non-obstructive CAD.   Normal LV systolic function. Cardiac cath 9/22/2020   Patent distal LAD stent. Successful PTCA/ZINA of proximal LAD. Unable to pass wire into OM2 due to hairpin loop. Unable to cross RPL lesion, possible local bridging collaterals.     Assessment / Acute Cardiac Problems:      1-CAD: with multiple PCIs in the past last one 04/2013: Nuclear stress test 02/2016. Cardiac cath 8/16/18 for recurrent chest pain showed patent distal LAD and proximal ramus stents with otherwise non-obstructive CAD. Cardiac cath 9/22/2020 PTCA/ZINA of proximal LAD. Patent distal LAD stent. Distal OM2 lesion, unable to pass wire due to hairpin loop and unable to cross RPL lesion due to possible local bridging collaterals. 2-HTN: well controlled. 3-HLP: on statin. LDL 65 on 10/10/19  4-Preserved LV systolic function. 5-Atypical non-cardiac musculoskeletal chest pain   6-Recurrent palpitations now resolved with event monitor 06/2016 showed NSR with rare short runs of PATs. 3-JG-bksstni per primary MD     Plan of Treatment:      1-Continue current medical treatment with ASA, Plavix, BB, ARB, CCB, Nitrates, Ranexa and statin. 2-Aggressive risk factors modifications. 3-Diet, exercise and loose weight. 4-Patient to discuss sleep study with PCP  5-RTC 6 months.       Shyla Dumont, MD  [facility]           [phone]

## 2020-10-15 ENCOUNTER — TELEPHONE (OUTPATIENT)
Dept: SURGERY | Age: 60
End: 2020-10-15

## 2020-10-15 ENCOUNTER — OFFICE VISIT (OUTPATIENT)
Dept: FAMILY MEDICINE CLINIC | Age: 60
End: 2020-10-15
Payer: MEDICARE

## 2020-10-15 VITALS
WEIGHT: 183 LBS | BODY MASS INDEX: 34.55 KG/M2 | DIASTOLIC BLOOD PRESSURE: 60 MMHG | HEART RATE: 72 BPM | SYSTOLIC BLOOD PRESSURE: 130 MMHG | HEIGHT: 61 IN

## 2020-10-15 PROCEDURE — G8484 FLU IMMUNIZE NO ADMIN: HCPCS | Performed by: FAMILY MEDICINE

## 2020-10-15 PROCEDURE — 1036F TOBACCO NON-USER: CPT | Performed by: FAMILY MEDICINE

## 2020-10-15 PROCEDURE — 3046F HEMOGLOBIN A1C LEVEL >9.0%: CPT | Performed by: FAMILY MEDICINE

## 2020-10-15 PROCEDURE — 99213 OFFICE O/P EST LOW 20 MIN: CPT

## 2020-10-15 PROCEDURE — G8417 CALC BMI ABV UP PARAM F/U: HCPCS | Performed by: FAMILY MEDICINE

## 2020-10-15 PROCEDURE — 2022F DILAT RTA XM EVC RTNOPTHY: CPT | Performed by: FAMILY MEDICINE

## 2020-10-15 PROCEDURE — 99214 OFFICE O/P EST MOD 30 MIN: CPT | Performed by: FAMILY MEDICINE

## 2020-10-15 PROCEDURE — G8427 DOCREV CUR MEDS BY ELIG CLIN: HCPCS | Performed by: FAMILY MEDICINE

## 2020-10-15 PROCEDURE — 3017F COLORECTAL CA SCREEN DOC REV: CPT | Performed by: FAMILY MEDICINE

## 2020-10-15 RX ORDER — CLOPIDOGREL BISULFATE 75 MG/1
TABLET ORAL
Qty: 90 TABLET | Refills: 1 | Status: SHIPPED | OUTPATIENT
Start: 2020-10-15 | End: 2021-04-30 | Stop reason: SDUPTHER

## 2020-10-15 RX ORDER — CITALOPRAM 40 MG/1
TABLET ORAL
Qty: 90 TABLET | Refills: 1 | Status: SHIPPED | OUTPATIENT
Start: 2020-10-15 | End: 2021-04-30 | Stop reason: SDUPTHER

## 2020-10-15 RX ORDER — ATORVASTATIN CALCIUM 40 MG/1
TABLET, FILM COATED ORAL
Qty: 90 TABLET | Refills: 1 | Status: SHIPPED | OUTPATIENT
Start: 2020-10-15 | End: 2021-11-11 | Stop reason: SDUPTHER

## 2020-10-15 RX ORDER — LOSARTAN POTASSIUM 100 MG/1
TABLET ORAL
Qty: 90 TABLET | Refills: 1 | Status: SHIPPED | OUTPATIENT
Start: 2020-10-15 | End: 2021-04-30 | Stop reason: SDUPTHER

## 2020-10-15 RX ORDER — NITROGLYCERIN 0.4 MG/1
0.4 TABLET SUBLINGUAL EVERY 5 MIN PRN
Qty: 25 TABLET | Refills: 1 | Status: SHIPPED | OUTPATIENT
Start: 2020-10-15 | End: 2021-04-14 | Stop reason: SDUPTHER

## 2020-10-15 RX ORDER — ISOSORBIDE MONONITRATE 30 MG/1
30 TABLET, EXTENDED RELEASE ORAL DAILY
Qty: 90 TABLET | Refills: 1 | Status: SHIPPED | OUTPATIENT
Start: 2020-10-15 | End: 2021-04-30 | Stop reason: SDUPTHER

## 2020-10-15 RX ORDER — LORAZEPAM 0.5 MG/1
TABLET ORAL
Qty: 30 TABLET | Refills: 0 | Status: SHIPPED | OUTPATIENT
Start: 2020-10-15 | End: 2020-11-15

## 2020-10-15 RX ORDER — TRAZODONE HYDROCHLORIDE 100 MG/1
TABLET ORAL
Qty: 90 TABLET | Refills: 1 | Status: SHIPPED | OUTPATIENT
Start: 2020-10-15 | End: 2021-04-30 | Stop reason: SDUPTHER

## 2020-10-15 RX ORDER — METFORMIN HYDROCHLORIDE 500 MG/1
TABLET, EXTENDED RELEASE ORAL
Qty: 180 TABLET | Refills: 1 | Status: SHIPPED | OUTPATIENT
Start: 2020-10-15 | End: 2021-04-30 | Stop reason: SDUPTHER

## 2020-10-15 RX ORDER — BUPROPION HYDROCHLORIDE 150 MG/1
TABLET, EXTENDED RELEASE ORAL
Qty: 90 TABLET | Refills: 1 | Status: SHIPPED | OUTPATIENT
Start: 2020-10-15 | End: 2021-04-30 | Stop reason: SDUPTHER

## 2020-10-15 RX ORDER — AMLODIPINE BESYLATE 10 MG/1
TABLET ORAL
Qty: 90 TABLET | Refills: 1 | Status: SHIPPED | OUTPATIENT
Start: 2020-10-15 | End: 2021-11-11 | Stop reason: SDUPTHER

## 2020-10-15 SDOH — ECONOMIC STABILITY: TRANSPORTATION INSECURITY
IN THE PAST 12 MONTHS, HAS THE LACK OF TRANSPORTATION KEPT YOU FROM MEDICAL APPOINTMENTS OR FROM GETTING MEDICATIONS?: NO

## 2020-10-15 SDOH — ECONOMIC STABILITY: FOOD INSECURITY: WITHIN THE PAST 12 MONTHS, THE FOOD YOU BOUGHT JUST DIDN'T LAST AND YOU DIDN'T HAVE MONEY TO GET MORE.: NEVER TRUE

## 2020-10-15 SDOH — ECONOMIC STABILITY: FOOD INSECURITY: WITHIN THE PAST 12 MONTHS, YOU WORRIED THAT YOUR FOOD WOULD RUN OUT BEFORE YOU GOT MONEY TO BUY MORE.: NEVER TRUE

## 2020-10-15 SDOH — ECONOMIC STABILITY: TRANSPORTATION INSECURITY
IN THE PAST 12 MONTHS, HAS LACK OF TRANSPORTATION KEPT YOU FROM MEETINGS, WORK, OR FROM GETTING THINGS NEEDED FOR DAILY LIVING?: NO

## 2020-10-15 ASSESSMENT — ENCOUNTER SYMPTOMS
ALLERGIC/IMMUNOLOGIC NEGATIVE: 1
GASTROINTESTINAL NEGATIVE: 1
WHEEZING: 0
SHORTNESS OF BREATH: 0
EYES NEGATIVE: 1

## 2020-10-15 ASSESSMENT — PATIENT HEALTH QUESTIONNAIRE - PHQ9
1. LITTLE INTEREST OR PLEASURE IN DOING THINGS: 0
2. FEELING DOWN, DEPRESSED OR HOPELESS: 0
SUM OF ALL RESPONSES TO PHQ QUESTIONS 1-9: 0
SUM OF ALL RESPONSES TO PHQ QUESTIONS 1-9: 0
SUM OF ALL RESPONSES TO PHQ9 QUESTIONS 1 & 2: 0
SUM OF ALL RESPONSES TO PHQ QUESTIONS 1-9: 0

## 2020-10-15 NOTE — TELEPHONE ENCOUNTER
Patient due for screening colonoscopy, no symptoms. Dr. Tomasz Clolazo colonoscopy paperwork mailed.

## 2020-10-15 NOTE — PATIENT INSTRUCTIONS
Admission on 09/22/2020, Discharged on 09/23/2020   Component Date Value Ref Range Status    POC Glucose 09/22/2020 254* 65 - 105 mg/dL Final    Glucose 09/23/2020 216* 70 - 99 mg/dL Final    BUN 09/23/2020 13  6 - 20 mg/dL Final    CREATININE 09/23/2020 0.90  0.50 - 0.90 mg/dL Final    Bun/Cre Ratio 09/23/2020 NOT REPORTED  9 - 20 Final    Calcium 09/23/2020 8.5* 8.6 - 10.4 mg/dL Final    Sodium 09/23/2020 137  135 - 144 mmol/L Final    Potassium 09/23/2020 4.5  3.7 - 5.3 mmol/L Final    Chloride 09/23/2020 102  98 - 107 mmol/L Final    CO2 09/23/2020 27  20 - 31 mmol/L Final    Anion Gap 09/23/2020 8* 9 - 17 mmol/L Final    Alkaline Phosphatase 09/23/2020 76  35 - 104 U/L Final    ALT 09/23/2020 17  5 - 33 U/L Final    AST 09/23/2020 13  <32 U/L Final    Total Bilirubin 09/23/2020 0.57  0.3 - 1.2 mg/dL Final    Total Protein 09/23/2020 6.0* 6.4 - 8.3 g/dL Final    Alb 09/23/2020 3.5  3.5 - 5.2 g/dL Final    Albumin/Globulin Ratio 09/23/2020 1.4  1.0 - 2.5 Final    GFR Non- 09/23/2020 >60  >60 mL/min Final    GFR  09/23/2020 >60  >60 mL/min Final    GFR Comment 09/23/2020        Final    Comment: Average GFR for 52-63 years old:   80 mL/min/1.73sq m  Chronic Kidney Disease:   <60 mL/min/1.73sq m  Kidney failure:   <15 mL/min/1.73sq m              eGFR calculated using average adult body mass.  Additional eGFR calculator available at:        [employer]            GFR Staging 09/23/2020 NOT REPORTED   Final    WBC 09/23/2020 10.0  3.5 - 11.3 k/uL Final    RBC 09/23/2020 3.64* 3.95 - 5.11 m/uL Final    Hemoglobin 09/23/2020 11.3* 11.9 - 15.1 g/dL Final    Hematocrit 09/23/2020 34.6* 36.3 - 47.1 % Final    MCV 09/23/2020 95.1  82.6 - 102.9 fL Final    MCH 09/23/2020 31.0  25.2 - 33.5 pg Final    MCHC 09/23/2020 32.7  28.4 - 34.8 g/dL Final    RDW 09/23/2020 12.9  11.8 - 14.4 % Final    Platelets 29/10/2814 211  787 - 043 k/uL Final    MPV 09/23/2020 11.2  8.1 - 13.5 fL Final    NRBC Automated 09/23/2020 0.0  0.0 per 100 WBC Final    POC Glucose 09/23/2020 311* 65 - 105 mg/dL Final

## 2020-10-15 NOTE — PROGRESS NOTES
symptoms.  Diabetes mellitus, type 2 ([facility] [address]     adult onset.  Examination of participant in clinical trial 14    End date 14    Gastroesophageal reflux disease     Hyperlipidemia     Hypertension     Hypomagnesemia     Insomnia     Normal left ventricular systolic function     Obesity     Obstructive sleep apnea     NO MACHINE USED    Sciatic nerve disease      Past Surgical History:   Procedure Laterality Date    BREAST BIOPSY Right 9-15-14    US guided -benign bx, mass in another location    BREAST LUMPECTOMY Right 10-1-2014    needle placement    CARDIAC CATHETERIZATION  2018    Patent distal LAD and proximal Ramus stents.  CARDIAC CATHETERIZATION  2020     SECTION       SECTION  1985    CHOLECYSTECTOMY      [facility]  2009    [facility]    CORONARY ANGIOPLASTY  2013    LAD--stent    CORONARY ANGIOPLASTY WITH STENT PLACEMENT  2011    LAD with diffuse plaque disease, left circumflex with mild irregularities, RCA with diffuse plaque disease, status post drug eluting stent to the proximal ramus.  CORONARY ANGIOPLASTY WITH STENT PLACEMENT  2008    LAD with minor proximal disease, distal smooth 50 to 60% stenosis, ramus intermedius with proximal 30 to 40% stenosis followed by a mid segment 90% stenosis, left circumflex was a relatively small vessel with ostial 50% stenosis, RCA was a large dominant vessel with mid segment irregularity and 30% stenosis, status post stent to the ramus intermedius.      CORONARY ANGIOPLASTY WITH STENT PLACEMENT  2013    LAD with minor proximal disease, distal smooth 50 to 60% stenosis, ramus intermedius with proximal 30 to 40% stenosis followed by a mid segment 90% stenosis, left circumflex was a relatively small vessel with ostial 50% stenosis, RCA was a large dominant vessel with mid segment irregularity and 30% stenosis, status post stent to the ramus intermedius.  CORONARY ANGIOPLASTY WITH STENT PLACEMENT  09/2009    LAD with minor proximal disease, distal smooth 50 to 60% stenosis, ramus intermedius with proximal 30 to 40% stenosis followed by a mid segment 90% stenosis, left circumflex was a relatively small vessel with ostial 50% stenosis, RCA was a large dominant vessel with mid segment irregularity and 30% stenosis, status post stent to the ramus intermedius.  FINGER TRIGGER RELEASE Left 7/13/2015    FRACTURE SURGERY Right 10/1998    ORIF, elbow, supracondylar fracture.  HYSTERECTOMY  1986    ovaries spared.  MOUTH SURGERY      teeth pulled for dentures    UPPER GASTROINTESTINAL ENDOSCOPY  08/09/2005    erythema and congestion in the antrum compatible with mild gastritis, polyps in the stomach body.       Current Outpatient Medications   Medication Sig Dispense Refill    metoprolol (LOPRESSOR) 100 MG tablet Take 1 tablet by mouth twice daily 180 tablet 0    ranolazine (RANEXA) 1000 MG extended release tablet Take 1 tablet by mouth 2 times daily 60 tablet 6    traZODone (DESYREL) 100 MG tablet Take 1 tablet by mouth nightly 90 tablet 0    buPROPion (WELLBUTRIN SR) 150 MG extended release tablet Take 1 tablet by mouth once daily 90 tablet 0    citalopram (CELEXA) 40 MG tablet Take 1 tablet by mouth once daily 90 tablet 0    clopidogrel (PLAVIX) 75 MG tablet Take 1 tablet by mouth once daily 90 tablet 0    atorvastatin (LIPITOR) 40 MG tablet Take 1 tablet by mouth once daily 90 tablet 0    metFORMIN (GLUCOPHAGE-XR) 500 MG extended release tablet Take 1 tablet by mouth twice daily 180 tablet 0    linagliptin (TRADJENTA) 5 MG tablet TAKE 1 TABLET BY MOUTH ONCE DAILY 90 tablet 1    amLODIPine (NORVASC) 10 MG tablet TAKE 1 TABLET BY MOUTH ONCE DAILY 90 tablet 1    isosorbide mononitrate (IMDUR) 30 MG extended release tablet Take 1 tablet by mouth daily 90 tablet 1    losartan (COZAAR) 100 MG tablet TAKE ONE TABLET BY MOUTH ONCE DAILY 90 tablet 1    nitroGLYCERIN (NITROSTAT) 0.4 MG SL tablet Place 1 tablet under the tongue every 5 minutes as needed for Chest pain 25 tablet 1    LORazepam (ATIVAN) 0.5 MG tablet TAKE ONE TABLET BY MOUTH AT BEDTIME AS NEEDED 30 tablet 0    aspirin 81 MG tablet Take 81 mg by mouth daily.  glimepiride (AMARYL) 4 MG tablet TAKE 1 TABLET BY MOUTH TWICE DAILY WITH MEALS 180 tablet 0    Blood Glucose Monitoring Suppl MICHELLE One glucometer to test 2 times a day and prn Type II diabetes uncontrolled not on insulin 1 Device 0    Glucose Blood (BLOOD GLUCOSE TEST STRIPS) STRP Test 2 times a day and prn. Uncontrolled type 2 diabetes not on insulin. 200 strip 3    Lancets MISC Lancet device and lancets. Test 2 times an day and prn. Type II Diabetes uncontrolled not on insulin 200 each 3     No current facility-administered medications for this visit. Allergies   Allergen Reactions    Codeine Nausea And Vomiting    Morphine Nausea And Vomiting       Review of Systems   Constitutional: Positive for fatigue. HENT: Negative. Eyes: Negative. Respiratory: Negative for shortness of breath and wheezing. Cardiovascular: Positive for leg swelling (mild, varies). Negative for chest pain and palpitations. Gastrointestinal: Negative. Endocrine: Negative. Genitourinary: Negative. Musculoskeletal: Positive for arthralgias (right elbow off an on s/p surgery). Negative for myalgias. Skin: Negative. Allergic/Immunologic: Negative. Neurological: Negative. Hematological: Negative. Psychiatric/Behavioral: Positive for sleep disturbance. Negative for dysphoric mood. The patient is not nervous/anxious. Objective:   Physical Exam  Constitutional:       General: She is not in acute distress. Appearance: She is well-developed. HENT:      Head: Normocephalic and atraumatic.       Right Ear: External ear normal.      Left Ear: External ear normal.      Mouth/Throat:      Pharynx: No oropharyngeal exudate. Eyes:      General: No scleral icterus. Conjunctiva/sclera: Conjunctivae normal.   Neck:      Musculoskeletal: Neck supple. Thyroid: No thyromegaly. Cardiovascular:      Rate and Rhythm: Normal rate and regular rhythm. Heart sounds: Normal heart sounds. No murmur. Pulmonary:      Effort: Pulmonary effort is normal. No respiratory distress. Breath sounds: Normal breath sounds. No wheezing. Abdominal:      General: Bowel sounds are normal. There is no distension. Palpations: Abdomen is soft. Tenderness: There is no abdominal tenderness. There is no rebound. Musculoskeletal: Normal range of motion. General: No tenderness. Skin:     General: Skin is warm and dry. Findings: No erythema or rash. Neurological:      Mental Status: She is alert and oriented to person, place, and time. Psychiatric:         Behavior: Behavior normal.         Thought Content: Thought content normal.         Judgment: Judgment normal.     /60 (Site: Right Upper Arm, Position: Sitting, Cuff Size: Large Adult)   Pulse 72   Ht 5' 1\" (1.549 m)   Wt 183 lb (83 kg)   LMP  (LMP Unknown)   BMI 34.58 kg/m²    Sensory exam of the foot is normal, tested with the monofilament. Good pulses, no lesions or ulcers, good peripheral pulses. Assessment:       Encounter Diagnoses   Name Primary?     Anxiety Yes    Encounter for screening mammogram for breast cancer     Coronary artery disease involving native coronary artery of native heart with angina pectoris ([address]     Essential hypertension     Recurrent major depressive disorder, in full remission ([address]     Type 2 diabetes mellitus without complication, without long-term current use of insulin (HCC)     Obstructive sleep apnea     Gastroesophageal reflux disease without esophagitis     Hypomagnesemia     Class 2 obesity due to excess calories without serious comorbidity with body mass index (BMI) of 37.0 to 37.9 in adult     Lung nodule     Encounter for screening colonoscopy            Plan:          CAD: per most recent cardiology follow up: \" CAD: with multiple PCIs in the past last one 04/2013: Nuclear stress test 02/2016. Cardiac cath 8/16/18 for recurrent chest pain showed patent distal LAD and proximal ramus stents with otherwise non-obstructive CAD. Cardiac cath 9/22/2020 PTCA/ZINA of proximal LAD. Patent distal LAD stent. Distal OM2 lesion, unable to pass wire due to hairpin loop and unable to cross RPL lesion due to possible local bridging collaterals. \"  Rare , recurrent chest pain. Less than prior to sept. Stent. She uses ntg prn . Associated headache. Cont. Ranexa, lopressor, statin, imdur. abnormal mammogram: biopsy right breast mass at 9 o'clock position. Benign fibroadenoma. She had further excisional biopsy due to concerns for correct clip placement/differences in mammogram vs ultrasound locations. Pathology benign/negative for malignancy. Updated Mammogram 3/4/16 with lumpectomy changes without evidence for malignancy. 6 month mammogram 9/26/16: benign. Returning to annual screening. Last couple without concern. Cont. Annually. Due for update, will order.       Htn:  cont. Current dosing of meds:norvasc, cozaar, lopressor.      Depression and anxiety: no depression concerns. Anxiety at baseline at present. Cont. Citalopram at 40mg /day. Using ativan prn.       Aodm:  She relates compliance with meds: metformin 500xr bid, amaryl 4mg/day, tradjenta 5 mg /day. She has had a couple of lows during the night down to '35\". Adding a snack at bedtime and encouraging more bs checks in the evening and am.    Reminder for eye exam. Sensory exam of the foot is normal, tested with the monofilament. Good pulses, no lesions or ulcers, good peripheral pulses.       Hyperlipidemia: on lipitor. Good control at present. Plan to cont. Same. labs pending.      Will: non compliant.  She reports today that

## 2021-02-09 RX ORDER — GLIMEPIRIDE 4 MG/1
TABLET ORAL
Qty: 180 TABLET | Refills: 0 | Status: SHIPPED | OUTPATIENT
Start: 2021-02-09 | End: 2021-07-15

## 2021-02-09 NOTE — TELEPHONE ENCOUNTER
Alcira Augustine called requesting a refill of the below medication which has been pended for you:     Requested Prescriptions     Pending Prescriptions Disp Refills    glimepiride (AMARYL) 4 MG tablet [Pharmacy Med Name: Glimepiride 4 MG Oral Tablet] 180 tablet 0     Sig: TAKE 1 TABLET BY MOUTH TWICE DAILY WITH MEALS       Last Appointment Date: 10/15/2020  Next Appointment Date: 4/14/2021    Allergies   Allergen Reactions    Codeine Nausea And Vomiting    Morphine Nausea And Vomiting

## 2021-04-14 ENCOUNTER — HOSPITAL ENCOUNTER (OUTPATIENT)
Dept: LAB | Age: 61
Discharge: HOME | End: 2021-04-14
Payer: MEDICARE

## 2021-04-14 ENCOUNTER — OFFICE VISIT (OUTPATIENT)
Dept: FAMILY MEDICINE CLINIC | Age: 61
End: 2021-04-14
Payer: MEDICARE

## 2021-04-14 ENCOUNTER — OFFICE VISIT (OUTPATIENT)
Dept: CARDIOLOGY | Age: 61
End: 2021-04-14
Payer: MEDICARE

## 2021-04-14 VITALS
WEIGHT: 183 LBS | HEIGHT: 61 IN | SYSTOLIC BLOOD PRESSURE: 136 MMHG | TEMPERATURE: 97.9 F | DIASTOLIC BLOOD PRESSURE: 78 MMHG | OXYGEN SATURATION: 99 % | BODY MASS INDEX: 34.55 KG/M2 | HEART RATE: 64 BPM

## 2021-04-14 VITALS
WEIGHT: 183 LBS | BODY MASS INDEX: 34.55 KG/M2 | HEIGHT: 61 IN | HEART RATE: 66 BPM | DIASTOLIC BLOOD PRESSURE: 76 MMHG | SYSTOLIC BLOOD PRESSURE: 142 MMHG

## 2021-04-14 DIAGNOSIS — I25.10 CORONARY ARTERY DISEASE INVOLVING NATIVE CORONARY ARTERY OF NATIVE HEART WITHOUT ANGINA PECTORIS: Primary | ICD-10-CM

## 2021-04-14 DIAGNOSIS — E78.5 HYPERLIPIDEMIA, UNSPECIFIED HYPERLIPIDEMIA TYPE: ICD-10-CM

## 2021-04-14 DIAGNOSIS — E66.09 CLASS 2 OBESITY DUE TO EXCESS CALORIES WITHOUT SERIOUS COMORBIDITY WITH BODY MASS INDEX (BMI) OF 37.0 TO 37.9 IN ADULT: ICD-10-CM

## 2021-04-14 DIAGNOSIS — R91.1 LUNG NODULE: ICD-10-CM

## 2021-04-14 DIAGNOSIS — K21.9 GASTROESOPHAGEAL REFLUX DISEASE WITHOUT ESOPHAGITIS: ICD-10-CM

## 2021-04-14 DIAGNOSIS — G47.00 INSOMNIA, UNSPECIFIED TYPE: ICD-10-CM

## 2021-04-14 DIAGNOSIS — Z12.31 ENCOUNTER FOR SCREENING MAMMOGRAM FOR BREAST CANCER: Primary | ICD-10-CM

## 2021-04-14 DIAGNOSIS — F33.42 RECURRENT MAJOR DEPRESSIVE DISORDER, IN FULL REMISSION (HCC): ICD-10-CM

## 2021-04-14 DIAGNOSIS — E11.69 TYPE 2 DIABETES MELLITUS WITH OTHER SPECIFIED COMPLICATION, WITHOUT LONG-TERM CURRENT USE OF INSULIN (HCC): ICD-10-CM

## 2021-04-14 DIAGNOSIS — I10 ESSENTIAL HYPERTENSION: ICD-10-CM

## 2021-04-14 DIAGNOSIS — E83.42 HYPOMAGNESEMIA: ICD-10-CM

## 2021-04-14 DIAGNOSIS — G47.33 OBSTRUCTIVE SLEEP APNEA: ICD-10-CM

## 2021-04-14 LAB
ABSOLUTE EOS #: 0.16 K/UL (ref 0–0.44)
ABSOLUTE IMMATURE GRANULOCYTE: 0.03 K/UL (ref 0–0.3)
ABSOLUTE LYMPH #: 2.01 K/UL (ref 1.1–3.7)
ABSOLUTE MONO #: 0.54 K/UL (ref 0.1–1.2)
ALBUMIN SERPL-MCNC: 4 G/DL (ref 3.5–5.2)
ALBUMIN/GLOBULIN RATIO: 1.4 (ref 1–2.5)
ALP BLD-CCNC: 82 U/L (ref 35–104)
ALT SERPL-CCNC: 21 U/L (ref 5–33)
ANION GAP SERPL CALCULATED.3IONS-SCNC: 7 MMOL/L (ref 9–17)
AST SERPL-CCNC: 17 U/L
BASOPHILS # BLD: 1 % (ref 0–2)
BASOPHILS ABSOLUTE: 0.04 K/UL (ref 0–0.2)
BILIRUB SERPL-MCNC: 0.3 MG/DL (ref 0.3–1.2)
BUN BLDV-MCNC: 15 MG/DL (ref 8–23)
BUN/CREAT BLD: 15 (ref 9–20)
CALCIUM SERPL-MCNC: 9 MG/DL (ref 8.6–10.4)
CHLORIDE BLD-SCNC: 102 MMOL/L (ref 98–107)
CHOLESTEROL/HDL RATIO: 2.6
CHOLESTEROL: 162 MG/DL
CO2: 31 MMOL/L (ref 20–31)
CREAT SERPL-MCNC: 1 MG/DL (ref 0.5–0.9)
DIFFERENTIAL TYPE: ABNORMAL
EOSINOPHILS RELATIVE PERCENT: 2 % (ref 1–4)
GFR AFRICAN AMERICAN: >60 ML/MIN
GFR NON-AFRICAN AMERICAN: 57 ML/MIN
GFR SERPL CREATININE-BSD FRML MDRD: ABNORMAL ML/MIN/{1.73_M2}
GFR SERPL CREATININE-BSD FRML MDRD: ABNORMAL ML/MIN/{1.73_M2}
GLUCOSE BLD-MCNC: 185 MG/DL (ref 70–99)
HCT VFR BLD CALC: 35.2 % (ref 36.3–47.1)
HDLC SERPL-MCNC: 63 MG/DL
HEMOGLOBIN: 11.6 G/DL (ref 11.9–15.1)
IMMATURE GRANULOCYTES: 0 %
LDL CHOLESTEROL: 84 MG/DL (ref 0–130)
LYMPHOCYTES # BLD: 24 % (ref 24–43)
MCH RBC QN AUTO: 31.5 PG (ref 25.2–33.5)
MCHC RBC AUTO-ENTMCNC: 33 G/DL (ref 25.2–33.5)
MCV RBC AUTO: 95.7 FL (ref 82.6–102.9)
MONOCYTES # BLD: 6 % (ref 3–12)
NRBC AUTOMATED: 0 PER 100 WBC
PDW BLD-RTO: 13.3 % (ref 11.8–14.4)
PLATELET # BLD: 227 K/UL (ref 138–453)
PLATELET ESTIMATE: ABNORMAL
PMV BLD AUTO: 10.1 FL (ref 8.1–13.5)
POTASSIUM SERPL-SCNC: 5 MMOL/L (ref 3.7–5.3)
RBC # BLD: 3.68 M/UL (ref 3.95–5.11)
RBC # BLD: ABNORMAL 10*6/UL
SEG NEUTROPHILS: 67 % (ref 36–65)
SEGMENTED NEUTROPHILS ABSOLUTE COUNT: 5.6 K/UL (ref 1.5–8.1)
SODIUM BLD-SCNC: 140 MMOL/L (ref 135–144)
TOTAL PROTEIN: 6.8 G/DL (ref 6.4–8.3)
TRIGL SERPL-MCNC: 73 MG/DL
VLDLC SERPL CALC-MCNC: NORMAL MG/DL (ref 1–30)
WBC # BLD: 8.4 K/UL (ref 3.5–11.3)
WBC # BLD: ABNORMAL 10*3/UL

## 2021-04-14 PROCEDURE — G8417 CALC BMI ABV UP PARAM F/U: HCPCS | Performed by: INTERNAL MEDICINE

## 2021-04-14 PROCEDURE — 83036 HEMOGLOBIN GLYCOSYLATED A1C: CPT

## 2021-04-14 PROCEDURE — 36415 COLL VENOUS BLD VENIPUNCTURE: CPT

## 2021-04-14 PROCEDURE — 80061 LIPID PANEL: CPT

## 2021-04-14 PROCEDURE — 3017F COLORECTAL CA SCREEN DOC REV: CPT | Performed by: INTERNAL MEDICINE

## 2021-04-14 PROCEDURE — 99214 OFFICE O/P EST MOD 30 MIN: CPT | Performed by: INTERNAL MEDICINE

## 2021-04-14 PROCEDURE — 93005 ELECTROCARDIOGRAM TRACING: CPT | Performed by: INTERNAL MEDICINE

## 2021-04-14 PROCEDURE — 93010 ELECTROCARDIOGRAM REPORT: CPT | Performed by: INTERNAL MEDICINE

## 2021-04-14 PROCEDURE — 3017F COLORECTAL CA SCREEN DOC REV: CPT | Performed by: FAMILY MEDICINE

## 2021-04-14 PROCEDURE — G8427 DOCREV CUR MEDS BY ELIG CLIN: HCPCS | Performed by: FAMILY MEDICINE

## 2021-04-14 PROCEDURE — 2022F DILAT RTA XM EVC RTNOPTHY: CPT | Performed by: FAMILY MEDICINE

## 2021-04-14 PROCEDURE — G8417 CALC BMI ABV UP PARAM F/U: HCPCS | Performed by: FAMILY MEDICINE

## 2021-04-14 PROCEDURE — 85025 COMPLETE CBC W/AUTO DIFF WBC: CPT

## 2021-04-14 PROCEDURE — 80053 COMPREHEN METABOLIC PANEL: CPT

## 2021-04-14 PROCEDURE — 1036F TOBACCO NON-USER: CPT | Performed by: INTERNAL MEDICINE

## 2021-04-14 PROCEDURE — 99213 OFFICE O/P EST LOW 20 MIN: CPT | Performed by: FAMILY MEDICINE

## 2021-04-14 PROCEDURE — 1036F TOBACCO NON-USER: CPT | Performed by: FAMILY MEDICINE

## 2021-04-14 PROCEDURE — G8427 DOCREV CUR MEDS BY ELIG CLIN: HCPCS | Performed by: INTERNAL MEDICINE

## 2021-04-14 PROCEDURE — 3046F HEMOGLOBIN A1C LEVEL >9.0%: CPT | Performed by: FAMILY MEDICINE

## 2021-04-14 PROCEDURE — 99214 OFFICE O/P EST MOD 30 MIN: CPT | Performed by: FAMILY MEDICINE

## 2021-04-14 RX ORDER — NITROGLYCERIN 0.4 MG/1
0.4 TABLET SUBLINGUAL EVERY 5 MIN PRN
Qty: 25 TABLET | Refills: 1 | Status: SHIPPED | OUTPATIENT
Start: 2021-04-14 | End: 2022-08-09 | Stop reason: SDUPTHER

## 2021-04-14 RX ORDER — GLUCOSAMINE HCL/CHONDROITIN SU 500-400 MG
CAPSULE ORAL
Qty: 200 STRIP | Refills: 3 | Status: SHIPPED | OUTPATIENT
Start: 2021-04-14

## 2021-04-14 ASSESSMENT — ENCOUNTER SYMPTOMS
ALLERGIC/IMMUNOLOGIC NEGATIVE: 1
WHEEZING: 0
EYES NEGATIVE: 1
GASTROINTESTINAL NEGATIVE: 1
SHORTNESS OF BREATH: 0

## 2021-04-14 NOTE — PROGRESS NOTES
Subjective:      Patient ID: Lilia Cobb is a 62 y.o. female. Coronary Artery Disease  Symptoms include leg swelling (mild, varies). Pertinent negatives include no chest pain, palpitations or shortness of breath. Risk factors include hyperlipidemia. Diabetes  Pertinent negatives for hypoglycemia include no nervousness/anxiousness. Associated symptoms include fatigue. Pertinent negatives for diabetes include no chest pain. Hypertension  Pertinent negatives include no chest pain, palpitations or shortness of breath. Hyperlipidemia  Pertinent negatives include no chest pain, myalgias or shortness of breath. Gastroesophageal Reflux  She reports no chest pain or no wheezing. Associated symptoms include fatigue. Routine follow up on chronic medical conditions, refills, and review of updated labs. I have reviewed the patient's medical history in detail and updated the computerized patient record. Currently living in [facility] with her parents. Step father with liver cancer and passed in April. She is staying with her mother 80 to help her out after the loss of her . Glucometer broke and not checking much recently,. No lows of concern. Using small snack at night. Depression good, no mood concerns. Rare chest pains. Saw cardiologist yesterday. Overall doing well. Sleeping issues, mostly falling asleep taking an hour to 2 hours. Off caffeine. Has prn trazodone. .  Compliant with medicaitons. Not compliant with cpap. She reports insurance wouldn't cover enough for her to afford. Discussed buying her own machine.    Past Medical History:   Diagnosis Date    Anxiety     Coronary artery disease     with history of multiple chest pain episodes, MI ruled out, with stents placed in 2008, 2009, and 2011 to the ramus artery, PTCA only to small diagonal.    Depression     with chronic dysthymia, prior intentional overdose on Ambien and Phenergan, history of agoraphobia symptoms.  Diabetes mellitus, type 2 ([facility] [address]     adult onset.  Examination of participant in clinical trial 14    End date 14    Gastroesophageal reflux disease     Hyperlipidemia     Hypertension     Hypomagnesemia     Insomnia     Normal left ventricular systolic function     Obesity     Obstructive sleep apnea     NO MACHINE USED    Sciatic nerve disease      Past Surgical History:   Procedure Laterality Date    BREAST BIOPSY Right 9-15-14    US guided -benign bx, mass in another location    BREAST LUMPECTOMY Right 10-1-2014    needle placement    CARDIAC CATHETERIZATION  2018    Patent distal LAD and proximal Ramus stents.  CARDIAC CATHETERIZATION  2020     SECTION       SECTION  1985    CHOLECYSTECTOMY      COR[facility]Y  2009    [facility]    CORONARY ANGIOPLASTY  2013    LAD--stent    CORONARY ANGIOPLASTY WITH STENT PLACEMENT  2011    LAD with diffuse plaque disease, left circumflex with mild irregularities, RCA with diffuse plaque disease, status post drug eluting stent to the proximal ramus.  CORONARY ANGIOPLASTY WITH STENT PLACEMENT  2008    LAD with minor proximal disease, distal smooth 50 to 60% stenosis, ramus intermedius with proximal 30 to 40% stenosis followed by a mid segment 90% stenosis, left circumflex was a relatively small vessel with ostial 50% stenosis, RCA was a large dominant vessel with mid segment irregularity and 30% stenosis, status post stent to the ramus intermedius.      CORONARY ANGIOPLASTY WITH STENT PLACEMENT  2013    LAD with minor proximal disease, distal smooth 50 to 60% stenosis, ramus intermedius with proximal 30 to 40% stenosis followed by a mid segment 90% stenosis, left circumflex was a relatively small vessel with ostial 50% stenosis, RCA was a large dominant vessel with mid segment irregularity and 30% stenosis, status post stent to the ramus daily 180 tablet 0    ranolazine (RANEXA) 1000 MG extended release tablet Take 1 tablet by mouth 2 times daily 60 tablet 6    Blood Glucose Monitoring Suppl MICHELLE One glucometer to test 2 times a day and prn Type II diabetes uncontrolled not on insulin 1 Device 0    Glucose Blood (BLOOD GLUCOSE TEST STRIPS) STRP Test 2 times a day and prn. Uncontrolled type 2 diabetes not on insulin. 200 strip 3    Lancets MISC Lancet device and lancets. Test 2 times an day and prn. Type II Diabetes uncontrolled not on insulin 200 each 3    aspirin 81 MG tablet Take 81 mg by mouth daily. No current facility-administered medications for this visit. Allergies   Allergen Reactions    Codeine Nausea And Vomiting    Morphine Nausea And Vomiting       Review of Systems   Constitutional: Positive for fatigue. HENT: Negative. Eyes: Negative. Respiratory: Negative for shortness of breath and wheezing. Cardiovascular: Positive for leg swelling (mild, varies). Negative for chest pain and palpitations. Gastrointestinal: Negative. Endocrine: Negative. Genitourinary: Negative. Musculoskeletal: Positive for arthralgias (right elbow off an on s/p surgery). Negative for myalgias. Skin: Negative. Allergic/Immunologic: Negative. Neurological: Negative. Hematological: Negative. Psychiatric/Behavioral: Positive for sleep disturbance. Negative for dysphoric mood. The patient is not nervous/anxious. Objective:   Physical Exam  Constitutional:       General: She is not in acute distress. Appearance: She is well-developed. HENT:      Head: Normocephalic and atraumatic. Right Ear: External ear normal.      Left Ear: External ear normal.      Mouth/Throat:      Pharynx: No oropharyngeal exudate. Eyes:      General: No scleral icterus. Conjunctiva/sclera: Conjunctivae normal.   Neck:      Musculoskeletal: Neck supple. Thyroid: No thyromegaly.    Cardiovascular:      Rate and Rhythm: Normal rate and regular rhythm. Heart sounds: Normal heart sounds. No murmur. Pulmonary:      Effort: Pulmonary effort is normal. No respiratory distress. Breath sounds: Normal breath sounds. No wheezing. Abdominal:      General: Bowel sounds are normal. There is no distension. Palpations: Abdomen is soft. Tenderness: There is no abdominal tenderness. There is no rebound. Musculoskeletal: Normal range of motion. General: No tenderness. Skin:     General: Skin is warm and dry. Findings: No erythema or rash. Neurological:      Mental Status: She is alert and oriented to person, place, and time. Psychiatric:         Behavior: Behavior normal.         Thought Content: Thought content normal.         Judgment: Judgment normal.     /78 (Site: Left Upper Arm, Position: Sitting, Cuff Size: Large Adult)   Pulse 64   Temp 97.9 °F (36.6 °C) (Tympanic)   Ht 5' 1\" (1.549 m)   Wt 183 lb (83 kg)   LMP  (LMP Unknown)   SpO2 99%   BMI 34.58 kg/m²        Assessment:       Encounter Diagnoses   Name Primary?  Encounter for screening mammogram for breast cancer Yes    Essential hypertension     Recurrent major depressive disorder, in full remission ([address]     Type 2 diabetes mellitus with other specified complication, without long-term current use of insulin ([address]     Hyperlipidemia, unspecified hyperlipidemia type     Obstructive sleep apnea     Gastroesophageal reflux disease without esophagitis     Hypomagnesemia     Class 2 obesity due to excess calories without serious comorbidity with body mass index (BMI) of 37.0 to 37.9 in adult     Lung nodule     Insomnia, unspecified type        Awaiting updated labs at present. Plan:          CAD: per most recent cardiology follow up: \" CAD: with multiple PCIs in the past last one 04/2013: Nuclear stress test 02/2016. Cardiac cath 8/16/18 for recurrent chest pain showed patent distal LAD and proximal ramus stents with otherwise non-obstructive CAD. Cardiac cath 9/22/2020 PTCA/ZINA of proximal LAD. Patent distal LAD stent. Distal OM2 lesion, unable to pass wire due to hairpin loop and unable to cross RPL lesion due to possible local bridging collaterals. \"  Rare , recurrent chest pain. Less than prior to sept. Stent. She uses ntg prn . Associated headache. Cont. Ranexa, lopressor, statin, imdur. abnormal mammogram: biopsy right breast mass at 9 o'clock position. Benign fibroadenoma. She had further excisional biopsy due to concerns for correct clip placement/differences in mammogram vs ultrasound locations. Pathology benign/negative for malignancy. Updated Mammogram 3/4/16 with lumpectomy changes without evidence for malignancy. 6 month mammogram 9/26/16: benign. Returning to annual screening. Last couple without concern. Cont. Annually. Due for update, will order.       Htn:  cont. Current dosing of meds:norvasc, cozaar, lopressor.      Depression and anxiety: no depression concerns. Anxiety at baseline at present. Cont. Citalopram at 40mg /day. Using ativan prn.       Aodm:  She relates compliance with meds: metformin 500xr bid, amaryl 4mg/day, tradjenta 5 mg /day. She has had a couple of lows during the night down to '35\". Adding a snack at bedtime and encouraging more bs checks in the evening and am.    Reminder for eye exam. Sensory exam of the foot is normal, tested with the monofilament. Good pulses, no lesions or ulcers, good peripheral pulses.       Hyperlipidemia: on lipitor. Good control at present. Plan to cont. Same. labs pending.      Will: non compliant. She reports today that she doesn't have one. Insurance would not cover all of it. She couldn't afford it. Denies daytime somnolence. Consider purchasing own machine     Gerd: mild, intermittent symptoms, improved after getting rid of carbonated beverages.  Doing ok with lifestyle mods at present.      Hypomagnesemia: she stopped the supplement and levels went low again. She has not restarted yet.      Obesity: wt. Fairly stable . She continues to try and loose wt.  rec. Restricted carb. Diet.         Lung nodule; Incidental lung nodules found on cxr and CT scan 8/25/14. Unknown significance. No thoracic lymph nodes. No smoking history  CT confirms a 10 mm nodule in the left upper lobe (series 3, image 36)    with an adjacent 4 mm at the cardiac border. Linear densities in the    right middle lobe and both lower lobes compatible with subsegmental    atelectasis versus scarring. No pulmonary infiltrate.        No pleural or pericardial effusion.        No enlarged thoracic lymph node by CT criteria.          Updated CT showed unchanged nodularity 3/10/15; IMPRESSION:    1. Unchanged pulmonary nodularity, largest 11 mm in the left upper lobe. Stable findings in the left hilum and left superior mediastinum, may    represent small lymph nodes though of uncertain etiology or significance. None of these are clearly calcified. PET/CT could be considered for    metabolic assessment and planning for any potential biopsy. Otherwise,    close CT followup would be recommended to ensure 2 years of stability. 2. Small hypodense thyroid nodules, which could be further    evaluated/followed up with ultrasound.               CT 7/30/18:     Impression    Stable CT with re- demonstration of few lung nodules largest again measuring    1.1 cm in the lingula with subtle calcification suggesting calcified    granuloma.  No acute process with few incidental/chronic findings as    described. Chronic insomnia: good initial response to trazodone. Starting to see decreased efficacy, likely due to tolerance. Increased to 100mg hs. Variable efficacy at present. Works mostly, some nights doesn't seem to work. Continues prn use. .       Discussed colonoscopy screening. Mammogram today.     Willing to schedule c/s with dr. Sudha Perez, she has not followed

## 2021-04-14 NOTE — PROGRESS NOTES
Today's Date: 2021  Patient's Name: Maco Cameron  Patient's age: 62 y.o., 1960    Subjective:  Maco Cameron  is her for follow up. She had COVID infection in 2021. She denies any dyspnea. She reports mild chest discomfort at time not related to exertion. She reports mild edema (not present today). No PND, no syncope or pre-syncope, no orthopnea. Past Medical History:   has a past medical history of Anxiety, Coronary artery disease, Depression, Diabetes mellitus, type 2 ([facility] [address] Examination of participant in clinical trial, Gastroesophageal reflux disease, Hyperlipidemia, Hypertension, Hypomagnesemia, Insomnia, Normal left ventricular systolic function, Obesity, Obstructive sleep apnea, and Sciatic nerve disease. Past Surgical History:   has a past surgical history that includes Hysterectomy (); fracture surgery (Right, 10/1998); Upper gastrointestinal endoscopy (2005);  section ();  section (1985); Finger trigger release (Left, 2015); Coronary angioplasty (2009); Coronary angioplasty (2013); Breast biopsy (Right, 9-15-14); Breast lumpectomy (Right, 10-1-2014); Cholecystectomy (); Mouth surgery; Cardiac catheterization (2018); Cardiac catheterization (2020); Coronary angioplasty with stent (2011); Coronary angioplasty with stent (2008); Coronary angioplasty with stent (2013); and Coronary angioplasty with stent (2009). Home Medications:  Prior to Admission medications    Medication Sig Start Date End Date Taking?  Authorizing Provider   glimepiride (AMARYL) 4 MG tablet TAKE 1 TABLET BY MOUTH TWICE DAILY WITH MEALS 21  Yes Symone Ferrell MD   traZODone (DESYREL) 100 MG tablet Take 1 tablet by mouth nightly 10/15/20  Yes Symone Ferrell MD   buPROPion [facility] SR) 150 MG extended release tablet 1 po daily 10/15/20  Yes Symone Ferrell MD   citalopram (CELEXA) 40 MG tablet 1 po daily 10/15/20  Yes Delicia Andersen edema  Neurological:  · Alert and oriented. Cardiac Data:  EKG: NSR, NL ECG    Labs:     CBC: No results for input(s): WBC, HGB, HCT, PLT in the last 72 hours. BMP: No results for input(s): NA, K, CO2, BUN, CREATININE, LABGLOM, GLUCOSE in the last 72 hours. PT/INR: No results for input(s): PROTIME, INR in the last 72 hours. FASTING LIPID PANEL:  Lab Results   Component Value Date    HDL 52 10/10/2019    TRIG 74 10/10/2019     LIVER PROFILE:No results for input(s): AST, ALT, LABALBU in the last 72 hours. Cardiac cath 8/16/18:   Patent distal LAD and proximal Ramus stents.   Otherwise, non-obstructive CAD.   Normal LV systolic function.     Cardiac cath 9/22/2020   Patent distal LAD stent.   Successful PTCA/ZINA of proximal LAD.   Unable to pass wire into OM2 due to hairpin loop.   Unable to cross RPL lesion, possible local bridging collaterals.     Assessment / Acute Cardiac Problems:      1-CAD: with multiple PCIs in the past last one 04/2013: Nuclear stress test 02/2016. Cardiac cath 8/16/18 for recurrent chest pain showed patent distal LAD and proximal ramus stents with otherwise non-obstructive CAD. Cardiac cath 9/22/2020 PTCA/ZINA of proximal LAD. Patent distal LAD stent. Distal OM2 lesion, unable to pass wire due to hairpin loop and unable to cross RPL lesion due to possible local bridging collaterals. 2-HTN: well controlled. 3-HLP: on statin. LDL 65 on 10/10/19  4-Preserved LV systolic function. 5-Non-cardiac musculoskeletal chest pain   6-Recurrent palpitations now resolved with event monitor 06/2016 showed NSR with rare short runs of PATs. 8-[id number] per primary MD     Plan of Treatment:      1-Continue current medical treatment with ASA, Plavix, BB, ARB, CCB, Nitrates, Ranexa and statin. Obtain lipid panel. 2-Aggressive risk factors modifications. 3-Diet, exercise and loose weight. 4-Patient to discuss need for repeat sleep study.  She reports that she had JUMA on sleep study many years ago but insurance wont cover CPAP. 5- RTC 6 months.       Kalani Estrada [id number]           [phone]

## 2021-04-15 LAB
ESTIMATED AVERAGE GLUCOSE: 183 MG/DL
HBA1C MFR BLD: 8 % (ref 4–6)

## 2021-04-19 DIAGNOSIS — E78.5 HYPERLIPIDEMIA, UNSPECIFIED HYPERLIPIDEMIA TYPE: Primary | ICD-10-CM

## 2021-04-19 RX ORDER — EZETIMIBE 10 MG/1
10 TABLET ORAL DAILY
Qty: 30 TABLET | Refills: 5 | Status: SHIPPED | OUTPATIENT
Start: 2021-04-19 | End: 2021-11-11 | Stop reason: SDUPTHER

## 2021-04-30 ENCOUNTER — HOSPITAL ENCOUNTER (OUTPATIENT)
Dept: LAB | Age: 61
Discharge: HOME | End: 2021-04-30
Payer: MEDICARE

## 2021-04-30 ENCOUNTER — OFFICE VISIT (OUTPATIENT)
Dept: FAMILY MEDICINE CLINIC | Age: 61
End: 2021-04-30
Payer: MEDICARE

## 2021-04-30 VITALS
SYSTOLIC BLOOD PRESSURE: 160 MMHG | BODY MASS INDEX: 34.17 KG/M2 | HEART RATE: 72 BPM | HEIGHT: 61 IN | WEIGHT: 181 LBS | DIASTOLIC BLOOD PRESSURE: 84 MMHG

## 2021-04-30 DIAGNOSIS — G44.52 NEW DAILY PERSISTENT HEADACHE: ICD-10-CM

## 2021-04-30 DIAGNOSIS — G44.52 NEW DAILY PERSISTENT HEADACHE: Primary | ICD-10-CM

## 2021-04-30 LAB — SEDIMENTATION RATE, ERYTHROCYTE: 14 MM (ref 0–30)

## 2021-04-30 PROCEDURE — 1036F TOBACCO NON-USER: CPT | Performed by: FAMILY MEDICINE

## 2021-04-30 PROCEDURE — 99214 OFFICE O/P EST MOD 30 MIN: CPT | Performed by: FAMILY MEDICINE

## 2021-04-30 PROCEDURE — 85651 RBC SED RATE NONAUTOMATED: CPT

## 2021-04-30 PROCEDURE — G8417 CALC BMI ABV UP PARAM F/U: HCPCS | Performed by: FAMILY MEDICINE

## 2021-04-30 PROCEDURE — 36415 COLL VENOUS BLD VENIPUNCTURE: CPT

## 2021-04-30 PROCEDURE — G8427 DOCREV CUR MEDS BY ELIG CLIN: HCPCS | Performed by: FAMILY MEDICINE

## 2021-04-30 PROCEDURE — 99213 OFFICE O/P EST LOW 20 MIN: CPT | Performed by: FAMILY MEDICINE

## 2021-04-30 PROCEDURE — 3017F COLORECTAL CA SCREEN DOC REV: CPT | Performed by: FAMILY MEDICINE

## 2021-04-30 RX ORDER — TRAZODONE HYDROCHLORIDE 100 MG/1
TABLET ORAL
Qty: 90 TABLET | Refills: 1 | Status: SHIPPED | OUTPATIENT
Start: 2021-04-30 | End: 2021-11-11 | Stop reason: SDUPTHER

## 2021-04-30 RX ORDER — CLOPIDOGREL BISULFATE 75 MG/1
TABLET ORAL
Qty: 90 TABLET | Refills: 1 | Status: SHIPPED | OUTPATIENT
Start: 2021-04-30 | End: 2021-11-11 | Stop reason: SDUPTHER

## 2021-04-30 RX ORDER — HYDROCHLOROTHIAZIDE 50 MG/1
50 TABLET ORAL DAILY
Qty: 30 TABLET | Refills: 3 | Status: SHIPPED | OUTPATIENT
Start: 2021-04-30 | End: 2021-11-11 | Stop reason: SDUPTHER

## 2021-04-30 RX ORDER — METFORMIN HYDROCHLORIDE 500 MG/1
TABLET, EXTENDED RELEASE ORAL
Qty: 180 TABLET | Refills: 1 | Status: SHIPPED | OUTPATIENT
Start: 2021-04-30 | End: 2021-11-11 | Stop reason: SDUPTHER

## 2021-04-30 RX ORDER — METOPROLOL TARTRATE 100 MG/1
TABLET ORAL
Qty: 180 TABLET | Refills: 1 | Status: SHIPPED | OUTPATIENT
Start: 2021-04-30 | End: 2021-11-11 | Stop reason: SDUPTHER

## 2021-04-30 RX ORDER — CITALOPRAM 40 MG/1
TABLET ORAL
Qty: 90 TABLET | Refills: 1 | Status: SHIPPED | OUTPATIENT
Start: 2021-04-30 | End: 2021-11-11 | Stop reason: SDUPTHER

## 2021-04-30 RX ORDER — LOSARTAN POTASSIUM 100 MG/1
TABLET ORAL
Qty: 90 TABLET | Refills: 1 | Status: SHIPPED | OUTPATIENT
Start: 2021-04-30 | End: 2021-11-11 | Stop reason: SDUPTHER

## 2021-04-30 RX ORDER — ISOSORBIDE MONONITRATE 30 MG/1
30 TABLET, EXTENDED RELEASE ORAL DAILY
Qty: 90 TABLET | Refills: 1 | Status: SHIPPED | OUTPATIENT
Start: 2021-04-30 | End: 2021-11-11 | Stop reason: SDUPTHER

## 2021-04-30 RX ORDER — BUPROPION HYDROCHLORIDE 150 MG/1
TABLET, EXTENDED RELEASE ORAL
Qty: 90 TABLET | Refills: 1 | Status: SHIPPED | OUTPATIENT
Start: 2021-04-30 | End: 2021-11-11 | Stop reason: SDUPTHER

## 2021-04-30 ASSESSMENT — PATIENT HEALTH QUESTIONNAIRE - PHQ9
SUM OF ALL RESPONSES TO PHQ QUESTIONS 1-9: 0
SUM OF ALL RESPONSES TO PHQ9 QUESTIONS 1 & 2: 0

## 2021-04-30 ASSESSMENT — ENCOUNTER SYMPTOMS
ALLERGIC/IMMUNOLOGIC NEGATIVE: 1
EYES NEGATIVE: 1
GASTROINTESTINAL NEGATIVE: 1
WHEEZING: 0
SHORTNESS OF BREATH: 0
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
## 2021-05-01 ENCOUNTER — HOSPITAL ENCOUNTER (EMERGENCY)
Age: 61
Discharge: HOME | End: 2021-05-01
Attending: EMERGENCY MEDICINE
Payer: MEDICARE

## 2021-05-01 ENCOUNTER — APPOINTMENT (OUTPATIENT)
Dept: CT IMAGING | Age: 61
End: 2021-05-01
Payer: MEDICARE

## 2021-05-01 VITALS
HEART RATE: 87 BPM | OXYGEN SATURATION: 97 % | BODY MASS INDEX: 34.17 KG/M2 | WEIGHT: 181 LBS | HEIGHT: 61 IN | RESPIRATION RATE: 20 BRPM | SYSTOLIC BLOOD PRESSURE: 173 MMHG | TEMPERATURE: 97.4 F | DIASTOLIC BLOOD PRESSURE: 86 MMHG

## 2021-05-01 DIAGNOSIS — R42 DIZZINESS: Primary | ICD-10-CM

## 2021-05-01 DIAGNOSIS — R51.9 NONINTRACTABLE EPISODIC HEADACHE, UNSPECIFIED HEADACHE TYPE: ICD-10-CM

## 2021-05-01 PROCEDURE — 99285 EMERGENCY DEPT VISIT HI MDM: CPT

## 2021-05-01 PROCEDURE — 70450 CT HEAD/BRAIN W/O DYE: CPT

## 2021-05-01 PROCEDURE — 6370000000 HC RX 637 (ALT 250 FOR IP): Performed by: EMERGENCY MEDICINE

## 2021-05-01 RX ORDER — MECLIZINE HYDROCHLORIDE 25 MG/1
25 TABLET ORAL 3 TIMES DAILY
Qty: 15 TABLET | Refills: 0 | Status: SHIPPED | OUTPATIENT
Start: 2021-05-01 | End: 2021-05-06

## 2021-05-01 RX ORDER — MECLIZINE HCL 12.5 MG/1
25 TABLET ORAL ONCE
Status: COMPLETED | OUTPATIENT
Start: 2021-05-01 | End: 2021-05-01

## 2021-05-01 RX ADMIN — MECLIZINE 25 MG: 12.5 TABLET ORAL at 22:45

## 2021-05-01 ASSESSMENT — PAIN DESCRIPTION - FREQUENCY: FREQUENCY: CONTINUOUS

## 2021-05-01 ASSESSMENT — PAIN DESCRIPTION - ORIENTATION: ORIENTATION: LEFT

## 2021-05-01 ASSESSMENT — PAIN DESCRIPTION - PROGRESSION: CLINICAL_PROGRESSION: NOT CHANGED

## 2021-05-01 ASSESSMENT — PAIN DESCRIPTION - PAIN TYPE: TYPE: ACUTE PAIN
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
## 2021-05-02 NOTE — ED PROVIDER NOTES
eMERGENCY dEPARTMENT eNCOUnter      Pt Name: Jessika Helton  MRN: [id number]  [place] 1960  Date of evaluation: 2021      CHIEF COMPLAINT       Chief Complaint   Patient presents with    Headache    Dizziness    Fatigue         HISTORY OF PRESENT ILLNESS    Jessika Helton is a 62 y.o. female who presents with headache and dizziness. Patient states for about a week she has been having this kind of mild headache throughout the frontal and left side of her head she states occasionally she gets a severe sharp pain that shoots up last for secondary to rates that would as an 8 otherwise to baseline is about a 4 she saw her primary yesterday who ordered a sed rate sed rate is normal at this time she is concerned because last night she started having some dizziness and feels like she has difficulty walking because she wants to fall below for no other numbness tingling weakness no chest pain no shortness of breath        REVIEW OF SYSTEMS       Review of systems are all reviewed and negative except stated above in HPI    [address]    has a past medical history of Anxiety, Coronary artery disease, Depression, Diabetes mellitus, type 2 ([address] Examination of participant in clinical trial, Gastroesophageal reflux disease, Hyperlipidemia, Hypertension, Hypomagnesemia, Insomnia, Normal left ventricular systolic function, Obesity, Obstructive sleep apnea, and Sciatic nerve disease. SURGICAL HISTORY      has a past surgical history that includes Hysterectomy (); fracture surgery (Right, 10/1998); Upper gastrointestinal endoscopy (2005);  section ();  section (1985); Finger trigger release (Left, 2015); Coronary angioplasty (2009); Coronary angioplasty (2013); Breast biopsy (Right, 9-15-14); Breast lumpectomy (Right, 10-1-2014); Cholecystectomy (); Mouth surgery; Cardiac catheterization (2018); Cardiac catheterization (2020);  Coronary tablet,R-1Normal      amLODIPine (NORVASC) 10 MG tablet TAKE 1 TABLET BY MOUTH ONCE DAILY, Disp-90 tablet,R-1Normal      ranolazine (RANEXA) 1000 MG extended release tablet Take 1 tablet by mouth 2 times daily, Disp-60 tablet,R-6Normal      Lancets MISC Disp-200 each, R-3, PrintLancet device and lancets. Test 2 times an day and prn. Type II Diabetes uncontrolled not on insulin      aspirin 81 MG tablet Take 81 mg by mouth daily. ALLERGIES     is allergic to codeine and morphine. FAMILY HISTORY     She indicated that her mother is . She indicated that her father is . She indicated that her maternal grandmother is . She indicated that her maternal grandfather is . She indicated that her paternal grandmother is . She indicated that her paternal grandfather is . She indicated that her daughter is alive. She indicated that the status of her neg hx is unknown.     family history includes Breast Cancer (age of onset: 62) in her mother; Cataracts in her mother; Diabetes in her father; Heart Attack in her father; Heart Disease in her father; Hypertension in her mother. SOCIAL HISTORY      reports that she has never smoked. She has never used smokeless tobacco. She reports that she does not drink alcohol or use drugs. PHYSICAL EXAM     INITIAL VITALS:  height is 5' 1\" (1.549 m) and weight is 181 lb (82.1 kg). Her tympanic temperature is 97.4 °F (36.3 °C). Her blood pressure is 173/86 (abnormal) and her pulse is 87. Her respiration is 20 and oxygen saturation is 97%.       General: Patient alert nontoxic-appearing elderly female no apparent distress  HEENT: Head is atraumatic conjunctiva are clear pupils are equal and reactive extraocular muscles are intact without nystagmus TMs are clear  Neck: Supple no meningismus no significant lymphadenopathy  Respiratory: Lung sounds are clear bilateral  Cardiac: Heart is regular rate and rhythm  Neuro: Patient has no gross focal neurological deficits at bedside exam with the exception when we got her up to ambulate and tandem gait she continues to fall backwards but she does catch herself    DIFFERENTIAL DIAGNOSIS/ MDM:     Obtain CAT scan    DIAGNOSTIC RESULTS     EKG: All EKG's are interpreted by the Emergency Department Physician who either signs or Co-signs this chart in the absence of a cardiologist.        RADIOLOGY:   I directly visualized the following  images and reviewed the radiologist interpretations:  CT HEAD WO CONTRAST   Final Result   No acute intracranial abnormality. Mild chronic microvascular disease. LABS:  Labs Reviewed - No data to display      EMERGENCY DEPARTMENT COURSE:   Vitals:    Vitals:    05/01/21 2036 05/01/21 2223 05/01/21 2247   BP: (!) 175/87 (!) 175/78 (!) 173/86   Pulse: 80 72 87   Resp: 22 20 20   Temp: 97.4 °F (36.3 °C)     TempSrc: Tympanic     SpO2: 95% 98% 97%   Weight: 181 lb (82.1 kg)     Height: 5' 1\" (1.549 m)       -------------------------  BP: (!) 173/86, Temp: 97.4 °F (36.3 °C), Pulse: 87, Resp: 20    Orders Placed This Encounter   Medications    meclizine (ANTIVERT) tablet 25 mg    meclizine (ANTIVERT) 25 MG tablet     Sig: Take 1 tablet by mouth three times daily for 5 days     Dispense:  15 tablet     Refill:  0           Re-evaluation Notes    CT per radiology shows nothing acute at this point I did speak with neurologist at [facility] [place] [facility] V's not have any further suggestions other than follow-up will instruct patient follow-up with primary on Monday for referral to neurology return immediately for worsening symptoms we will give her a short course of Antivert to see if we are able to help her with her symptoms    CRITICAL CARE:   None      CONSULTS:      PROCEDURES:  None    FINAL IMPRESSION      1. Dizziness    2.  Nonintractable episodic headache, unspecified headache type          DISPOSITION/PLAN   DISPOSITION Decision To Discharge 05/01/2021 10:27:19 PM      Condition on Disposition    Stable    PATIENT REFERRED TO:  Sandhya Boothe MD  [address] [facility]  [phone]    In 1 day        DISCHARGE MEDICATIONS:  Discharge Medication List as of 5/1/2021 10:28 PM      START taking these medications    Details   meclizine (ANTIVERT) 25 MG tablet Take 1 tablet by mouth three times daily for 5 days, Disp-15 tablet, R-0Normal             (Please note that portions of this note were completed with a voice recognition program.  Efforts were made to edit the dictations but occasionally words are mis-transcribed.)    Clark MD, F.A.C.E.P.   Attending Emergency Physician        Breanna Deleon MD  05/02/21 [id number]

## 2021-05-03 ENCOUNTER — TELEPHONE (OUTPATIENT)
Dept: FAMILY MEDICINE CLINIC | Age: 61
End: 2021-05-03

## 2021-05-03 RX ORDER — BLOOD-GLUCOSE METER
EACH MISCELLANEOUS
Qty: 1 KIT | Refills: 0 | Status: SHIPPED | OUTPATIENT
Start: 2021-05-03 | End: 2021-05-04 | Stop reason: SDUPTHER

## 2021-05-03 NOTE — TELEPHONE ENCOUNTER
Desiree Martinez called requesting a refill of the below medication which has been pended for you:     Requested Prescriptions     Pending Prescriptions Disp Refills    Blood Glucose Monitoring Suppl (ACCU-CHEK GUIDE ME) w/Device KIT [Pharmacy Med Name: ACCU-CHEK GUIDE ME   KIT] 1 kit 0     Sig: USE TO CHECK GLUCOSE ONCE DAILY       Last Appointment Date: 4/30/2021  Next Appointment Date: 5/14/2021    Allergies   Allergen Reactions    Codeine Nausea And Vomiting    Morphine Nausea And Vomiting

## 2021-05-04 DIAGNOSIS — E11.9 TYPE 2 DIABETES MELLITUS WITHOUT COMPLICATION, WITHOUT LONG-TERM CURRENT USE OF INSULIN (HCC): Primary | ICD-10-CM

## 2021-05-04 RX ORDER — BLOOD-GLUCOSE METER
EACH MISCELLANEOUS
Qty: 1 KIT | Refills: 0 | Status: SHIPPED | OUTPATIENT
Start: 2021-05-04

## 2021-06-01 ENCOUNTER — TELEPHONE (OUTPATIENT)
Dept: CARDIOLOGY | Age: 61
End: 2021-06-01

## 2021-06-01 NOTE — TELEPHONE ENCOUNTER
Left message for patient to call office. See below mychart message not read. Per Dr Julián Lunsford cardiology     Your LDL up to 84 from 72. Continue lipitor and will add zetia 10mg po qday. Your target LDL range is under 70. We have sent your Rx for Zetia to [facility] in [place]. If you have any questions please call our office and speak to nursing staff.    Thank you

## 2021-07-15 RX ORDER — GLIMEPIRIDE 4 MG/1
TABLET ORAL
Qty: 180 TABLET | Refills: 0 | Status: SHIPPED | OUTPATIENT
Start: 2021-07-15 | End: 2021-11-18

## 2021-07-19 ENCOUNTER — HOSPITAL ENCOUNTER (OUTPATIENT)
Dept: INTERVENTIONAL RADIOLOGY/VASCULAR | Age: 61
Discharge: HOME | End: 2021-07-21
Payer: MEDICARE

## 2021-07-19 ENCOUNTER — HOSPITAL ENCOUNTER (OUTPATIENT)
Dept: MAMMOGRAPHY | Age: 61
Discharge: HOME | End: 2021-07-21
Payer: MEDICARE

## 2021-07-19 DIAGNOSIS — N63.0 LUMP OF BREAST: Primary | ICD-10-CM

## 2021-07-19 DIAGNOSIS — Z12.31 ENCOUNTER FOR SCREENING MAMMOGRAM FOR BREAST CANCER: ICD-10-CM

## 2021-07-19 DIAGNOSIS — N63.0 LUMP OF BREAST: ICD-10-CM

## 2021-07-19 PROCEDURE — 76642 ULTRASOUND BREAST LIMITED: CPT

## 2021-07-19 PROCEDURE — G0279 TOMOSYNTHESIS, MAMMO: HCPCS

## 2021-08-11 ENCOUNTER — NURSE TRIAGE (OUTPATIENT)
Dept: OTHER | Facility: CLINIC | Age: 61
End: 2021-08-11

## 2021-08-11 NOTE — TELEPHONE ENCOUNTER
Received call from [facility] stating that patient needed an appt in the next three days. Writer advised [facility] that there were no openings and to transfer the patient to the writer so she can be advised to go to Urgent Care. Patient was transferred and patient was advised that Dr. Siena Manning is in Urgent care tomorrow and that she can come in between 8am and 8pm and see Dr Siena Manning. Patient voiced understanding and states that she will come in tomorrow.

## 2021-08-11 NOTE — TELEPHONE ENCOUNTER
Reason for Disposition   MODERATE pain (e.g., interferes with normal activities, limping) and present > 3 days   Numbness (i.e., loss of sensation) in hand or fingers    Answer Assessment - Initial Assessment Questions  1. ONSET: \"When did the pain start?\"       1 month    2. LOCATION: \"Where is the pain located? \"   Left leg from hip to foot    3. PAIN: \"How bad is the pain? \"    (Scale 1-10; or mild, moderate, severe)    -  MILD (1-3): doesn't interfere with normal activities     -  MODERATE (4-7): interferes with normal activities (e.g., work or school) or awakens from sleep, limping     -  SEVERE (8-10): excruciating pain, unable to do any normal activities, unable to walk  5/10    4. WORK OR EXERCISE: \"Has there been any recent work or exercise that involved this part of the body? \"     None    5. CAUSE: \"What do you think is causing the leg pain? \"     No idea    6. OTHER SYMPTOMS: \"Do you have any other symptoms? \" (e.g., chest pain, back pain, breathing difficulty, swelling, rash, fever, numbness, weakness)  Off/on swelling in left leg-mildly present now    7. PREGNANCY: \"Is there any chance you are pregnant? \" \"When was your last menstrual period? \"     n/a    Answer Assessment - Initial Assessment Questions  1. ONSET: \"When did the pain start? \"      2 months but worse in th past week    2. LOCATION: \"Where is the pain located? \"  Left shoulder    3. PAIN: \"How bad is the pain? \" (Scale 1-10; or mild, moderate, severe)    - MILD (1-3): doesn't interfere with normal activities    - MODERATE (4-7): interferes with normal activities (e.g., work or school) or awakens from sleep    - SEVERE (8-10): excruciating pain, unable to do any normal activities, unable to move arm at all due to pain  7/10    4. WORK OR EXERCISE: \"Has there been any recent work or exercise that involved this part of the body? \"   none    5. CAUSE: \"What do you think is causing the shoulder pain? \"  No idea    6.  OTHER SYMPTOMS: \"Do you have any

## 2021-11-11 ENCOUNTER — OFFICE VISIT (OUTPATIENT)
Dept: FAMILY MEDICINE CLINIC | Age: 61
End: 2021-11-11
Payer: MEDICARE

## 2021-11-11 VITALS
SYSTOLIC BLOOD PRESSURE: 136 MMHG | DIASTOLIC BLOOD PRESSURE: 72 MMHG | HEIGHT: 61 IN | HEART RATE: 72 BPM | WEIGHT: 184 LBS | BODY MASS INDEX: 34.74 KG/M2

## 2021-11-11 DIAGNOSIS — E78.5 HYPERLIPIDEMIA, UNSPECIFIED HYPERLIPIDEMIA TYPE: ICD-10-CM

## 2021-11-11 DIAGNOSIS — E66.09 CLASS 2 OBESITY DUE TO EXCESS CALORIES WITHOUT SERIOUS COMORBIDITY WITH BODY MASS INDEX (BMI) OF 37.0 TO 37.9 IN ADULT: ICD-10-CM

## 2021-11-11 DIAGNOSIS — I25.119 CORONARY ARTERY DISEASE INVOLVING NATIVE CORONARY ARTERY OF NATIVE HEART WITH ANGINA PECTORIS (HCC): Primary | ICD-10-CM

## 2021-11-11 DIAGNOSIS — K21.9 GASTROESOPHAGEAL REFLUX DISEASE WITHOUT ESOPHAGITIS: ICD-10-CM

## 2021-11-11 DIAGNOSIS — F33.42 RECURRENT MAJOR DEPRESSIVE DISORDER, IN FULL REMISSION (HCC): ICD-10-CM

## 2021-11-11 DIAGNOSIS — Z12.31 ENCOUNTER FOR SCREENING MAMMOGRAM FOR BREAST CANCER: ICD-10-CM

## 2021-11-11 DIAGNOSIS — R91.1 LUNG NODULE: ICD-10-CM

## 2021-11-11 DIAGNOSIS — G47.00 INSOMNIA, UNSPECIFIED TYPE: ICD-10-CM

## 2021-11-11 DIAGNOSIS — I10 ESSENTIAL HYPERTENSION: ICD-10-CM

## 2021-11-11 DIAGNOSIS — E83.42 HYPOMAGNESEMIA: ICD-10-CM

## 2021-11-11 DIAGNOSIS — E11.9 TYPE 2 DIABETES MELLITUS WITHOUT COMPLICATION, WITHOUT LONG-TERM CURRENT USE OF INSULIN (HCC): ICD-10-CM

## 2021-11-11 DIAGNOSIS — G47.33 OBSTRUCTIVE SLEEP APNEA: ICD-10-CM

## 2021-11-11 PROCEDURE — G8427 DOCREV CUR MEDS BY ELIG CLIN: HCPCS | Performed by: FAMILY MEDICINE

## 2021-11-11 PROCEDURE — 3017F COLORECTAL CA SCREEN DOC REV: CPT | Performed by: FAMILY MEDICINE

## 2021-11-11 PROCEDURE — G8417 CALC BMI ABV UP PARAM F/U: HCPCS | Performed by: FAMILY MEDICINE

## 2021-11-11 PROCEDURE — 99213 OFFICE O/P EST LOW 20 MIN: CPT | Performed by: FAMILY MEDICINE

## 2021-11-11 PROCEDURE — G8484 FLU IMMUNIZE NO ADMIN: HCPCS | Performed by: FAMILY MEDICINE

## 2021-11-11 PROCEDURE — 3052F HG A1C>EQUAL 8.0%<EQUAL 9.0%: CPT | Performed by: FAMILY MEDICINE

## 2021-11-11 PROCEDURE — 99214 OFFICE O/P EST MOD 30 MIN: CPT | Performed by: FAMILY MEDICINE

## 2021-11-11 PROCEDURE — 1036F TOBACCO NON-USER: CPT | Performed by: FAMILY MEDICINE

## 2021-11-11 PROCEDURE — 2022F DILAT RTA XM EVC RTNOPTHY: CPT | Performed by: FAMILY MEDICINE

## 2021-11-11 RX ORDER — CLOPIDOGREL BISULFATE 75 MG/1
TABLET ORAL
Qty: 90 TABLET | Refills: 1 | Status: SHIPPED | OUTPATIENT
Start: 2021-11-11 | End: 2022-05-03

## 2021-11-11 RX ORDER — ATORVASTATIN CALCIUM 40 MG/1
TABLET, FILM COATED ORAL
Qty: 90 TABLET | Refills: 1 | Status: SHIPPED | OUTPATIENT
Start: 2021-11-11 | End: 2022-03-31

## 2021-11-11 RX ORDER — METOPROLOL TARTRATE 100 MG/1
TABLET ORAL
Qty: 180 TABLET | Refills: 1 | Status: SHIPPED | OUTPATIENT
Start: 2021-11-11 | End: 2022-05-11 | Stop reason: SDUPTHER

## 2021-11-11 RX ORDER — BLOOD-GLUCOSE METER
1 KIT MISCELLANEOUS DAILY
Qty: 1 KIT | Refills: 0 | Status: SHIPPED | OUTPATIENT
Start: 2021-11-11 | End: 2022-08-09

## 2021-11-11 RX ORDER — METFORMIN HYDROCHLORIDE 500 MG/1
TABLET, EXTENDED RELEASE ORAL
Qty: 180 TABLET | Refills: 1 | Status: SHIPPED | OUTPATIENT
Start: 2021-11-11 | End: 2022-05-11 | Stop reason: SDUPTHER

## 2021-11-11 RX ORDER — HYDROCHLOROTHIAZIDE 50 MG/1
50 TABLET ORAL DAILY
Qty: 30 TABLET | Refills: 3 | Status: SHIPPED | OUTPATIENT
Start: 2021-11-11 | End: 2022-05-11 | Stop reason: SDUPTHER

## 2021-11-11 RX ORDER — BUPROPION HYDROCHLORIDE 150 MG/1
TABLET, EXTENDED RELEASE ORAL
Qty: 90 TABLET | Refills: 1 | Status: SHIPPED | OUTPATIENT
Start: 2021-11-11 | End: 2022-05-03

## 2021-11-11 RX ORDER — LOSARTAN POTASSIUM 100 MG/1
TABLET ORAL
Qty: 90 TABLET | Refills: 1 | Status: SHIPPED | OUTPATIENT
Start: 2021-11-11 | End: 2022-05-11 | Stop reason: SDUPTHER

## 2021-11-11 RX ORDER — AMLODIPINE BESYLATE 10 MG/1
TABLET ORAL
Qty: 90 TABLET | Refills: 1 | Status: SHIPPED | OUTPATIENT
Start: 2021-11-11 | End: 2022-05-11 | Stop reason: SDUPTHER

## 2021-11-11 RX ORDER — ISOSORBIDE MONONITRATE 30 MG/1
30 TABLET, EXTENDED RELEASE ORAL DAILY
Qty: 90 TABLET | Refills: 1 | Status: SHIPPED | OUTPATIENT
Start: 2021-11-11 | End: 2022-05-11 | Stop reason: SDUPTHER

## 2021-11-11 RX ORDER — EZETIMIBE 10 MG/1
10 TABLET ORAL DAILY
Qty: 90 TABLET | Refills: 1 | Status: SHIPPED | OUTPATIENT
Start: 2021-11-11 | End: 2022-05-11 | Stop reason: SDUPTHER

## 2021-11-11 RX ORDER — TRAZODONE HYDROCHLORIDE 100 MG/1
TABLET ORAL
Qty: 90 TABLET | Refills: 1 | Status: SHIPPED | OUTPATIENT
Start: 2021-11-11 | End: 2022-05-11 | Stop reason: SDUPTHER

## 2021-11-11 RX ORDER — CITALOPRAM 40 MG/1
TABLET ORAL
Qty: 90 TABLET | Refills: 1 | Status: SHIPPED | OUTPATIENT
Start: 2021-11-11 | End: 2022-05-03

## 2021-11-11 ASSESSMENT — ENCOUNTER SYMPTOMS
GASTROINTESTINAL NEGATIVE: 1
ALLERGIC/IMMUNOLOGIC NEGATIVE: 1
SHORTNESS OF BREATH: 0
EYES NEGATIVE: 1
WHEEZING: 0

## 2021-11-11 ASSESSMENT — PATIENT HEALTH QUESTIONNAIRE - PHQ9
2. FEELING DOWN, DEPRESSED OR HOPELESS: 0
SUM OF ALL RESPONSES TO PHQ9 QUESTIONS 1 & 2: 0
SUM OF ALL RESPONSES TO PHQ QUESTIONS 1-9: 0
1. LITTLE INTEREST OR PLEASURE IN DOING THINGS: 0
SUM OF ALL RESPONSES TO PHQ QUESTIONS 1-9: 0
SUM OF ALL RESPONSES TO PHQ QUESTIONS 1-9: 0

## 2021-11-11 NOTE — PROGRESS NOTES
Subjective:      Patient ID: Filippo Fitch is a 62 y.o. female. Diabetes  Pertinent negatives for hypoglycemia include no nervousness/anxiousness. Associated symptoms include fatigue. Pertinent negatives for diabetes include no chest pain. Hypertension  Pertinent negatives include no chest pain, palpitations or shortness of breath. Hyperlipidemia  Associated symptoms include leg pain and myalgias. Pertinent negatives include no chest pain or shortness of breath. Coronary Artery Disease  Symptoms include leg swelling (mild, varies). Pertinent negatives include no chest pain, palpitations or shortness of breath. Risk factors include hyperlipidemia. Gastroesophageal Reflux  She reports no chest pain or no wheezing. Associated symptoms include fatigue. Shoulder Pain     Leg Pain         Routine follow up on chronic medical conditions, refills, and review of updated labs. I have reviewed the patient's medical history in detail and updated the computerized patient record. Currently living in [facility] with her parents. Step father with liver cancer and passed in April. She is staying with her mother 80 to help her out after the loss of her . Glucometer broke and not checking much recently,. No lows of concern. Using small snack at night. Feels she needs a new meter at present. Depression good, no mood concerns. She has some winter time exacerbations and is watching things closely. Overall doing well. Sleeping issues, mostly falling asleep taking an hour to 2 hours. Off caffeine. Has prn trazodone. .  Compliant with medicaitons. Not compliant with cpap. She reports insurance wouldn't cover enough for her to afford. Discussed buying her own machine. Mole in right axilla to look at today. Left shoulder pain more prominent at present. No night pain. Mostly with certain movements. Leg pain fairly persistent .   Prolonged driving leads to trouble ambulating for a bit getting out of the care. Pain also with prolonged standing or lying. Past Medical History:   Diagnosis Date    Anxiety     Coronary artery disease     with history of multiple chest pain episodes, MI ruled out, with stents placed in , , and  to the ramus artery, PTCA only to small diagonal.    Depression     with chronic dysthymia, prior intentional overdose on Ambien and Phenergan, history of agoraphobia symptoms.  Diabetes mellitus, type 2 ([address]     adult onset.  Examination of participant in clinical trial 14    End date 14    Gastroesophageal reflux disease     Hyperlipidemia     Hypertension     Hypomagnesemia     Insomnia     Normal left ventricular systolic function     Obesity     Obstructive sleep apnea     NO MACHINE USED    Sciatic nerve disease      Past Surgical History:   Procedure Laterality Date    BREAST BIOPSY Right 9-15-14    US guided -benign bx, mass in another location    CARDIAC CATHETERIZATION  2018    Patent distal LAD and proximal Ramus stents.  CARDIAC CATHETERIZATION  2020     SECTION       SECTION  1985    CHOLECYSTECTOMY  1992    [facility]    CORONARY ANGIOPLASTY  2013    LAD--stent    CORONARY ANGIOPLASTY WITH STENT PLACEMENT  2011    LAD with diffuse plaque disease, left circumflex with mild irregularities, RCA with diffuse plaque disease, status post drug eluting stent to the proximal ramus.  CORONARY ANGIOPLASTY WITH STENT PLACEMENT  2008    LAD with minor proximal disease, distal smooth 50 to 60% stenosis, ramus intermedius with proximal 30 to 40% stenosis followed by a mid segment 90% stenosis, left circumflex was a relatively small vessel with ostial 50% stenosis, RCA was a large dominant vessel with mid segment irregularity and 30% stenosis, status post stent to the ramus intermedius.      CORONARY ANGIOPLASTY WITH STENT PLACEMENT  04/11/2013    LAD with minor proximal disease, distal smooth 50 to 60% stenosis, ramus intermedius with proximal 30 to 40% stenosis followed by a mid segment 90% stenosis, left circumflex was a relatively small vessel with ostial 50% stenosis, RCA was a large dominant vessel with mid segment irregularity and 30% stenosis, status post stent to the ramus intermedius.  CORONARY ANGIOPLASTY WITH STENT PLACEMENT  09/2009    LAD with minor proximal disease, distal smooth 50 to 60% stenosis, ramus intermedius with proximal 30 to 40% stenosis followed by a mid segment 90% stenosis, left circumflex was a relatively small vessel with ostial 50% stenosis, RCA was a large dominant vessel with mid segment irregularity and 30% stenosis, status post stent to the ramus intermedius.  FINGER TRIGGER RELEASE Left 7/13/2015    FRACTURE SURGERY Right 10/1998    ORIF, elbow, supracondylar fracture.  HYSTERECTOMY  1986    ovaries spared.  MOUTH SURGERY      teeth pulled for dentures    UPPER GASTROINTESTINAL ENDOSCOPY  08/09/2005    erythema and congestion in the antrum compatible with mild gastritis, polyps in the stomach body.       Current Outpatient Medications   Medication Sig Dispense Refill    glimepiride (AMARYL) 4 MG tablet TAKE 1 TABLET BY MOUTH TWICE DAILY WITH MEALS 180 tablet 0    citalopram (CELEXA) 40 MG tablet 1 po daily 90 tablet 1    buPROPion (WELLBUTRIN SR) 150 MG extended release tablet 1 po daily 90 tablet 1    traZODone (DESYREL) 100 MG tablet Take 1 tablet by mouth nightly 90 tablet 1    clopidogrel (PLAVIX) 75 MG tablet 1 po daily 90 tablet 1    metFORMIN (GLUCOPHAGE-XR) 500 MG extended release tablet 1 po  tablet 1    linagliptin (TRADJENTA) 5 MG tablet TAKE 1 TABLET BY MOUTH ONCE DAILY 90 tablet 1    isosorbide mononitrate (IMDUR) 30 MG extended release tablet Take 1 tablet by mouth daily 90 tablet 1    losartan (COZAAR) 100 MG tablet TAKE ONE TABLET BY MOUTH ONCE DAILY 90 tablet 1    metoprolol (LOPRESSOR) 100 MG tablet Take 1 tablet by mouth twice daily 180 tablet 1    hydroCHLOROthiazide (HYDRODIURIL) 50 MG tablet Take 1 tablet by mouth daily 30 tablet 3    ezetimibe (ZETIA) 10 MG tablet Take 1 tablet by mouth daily 30 tablet 5    nitroGLYCERIN (NITROSTAT) 0.4 MG SL tablet Place 1 tablet under the tongue every 5 minutes as needed for Chest pain 25 tablet 1    atorvastatin (LIPITOR) 40 MG tablet 1 po daily 90 tablet 1    amLODIPine (NORVASC) 10 MG tablet TAKE 1 TABLET BY MOUTH ONCE DAILY 90 tablet 1    ranolazine (RANEXA) 1000 MG extended release tablet Take 1 tablet by mouth 2 times daily 60 tablet 6    aspirin 81 MG tablet Take 81 mg by mouth daily.  Blood Glucose Monitoring Suppl (ACCU-CHEK GUIDE ME) w/Device KIT USE TO CHECK GLUCOSE ONCE DAILY 1 kit 0    blood glucose monitor strips Test 1 time a day 200 strip 3    Lancets MISC Lancet device and lancets. Test 2 times an day and prn. Type II Diabetes uncontrolled not on insulin 200 each 3     No current facility-administered medications for this visit. Allergies   Allergen Reactions    Codeine Nausea And Vomiting    Morphine Nausea And Vomiting       Review of Systems   Constitutional: Positive for fatigue. HENT: Negative. Eyes: Negative. Respiratory: Negative for shortness of breath and wheezing. Cardiovascular: Positive for leg swelling (mild, varies). Negative for chest pain and palpitations. Gastrointestinal: Negative. Endocrine: Negative. Genitourinary: Negative. Musculoskeletal: Positive for arthralgias (right elbow off an on s/p surgery) and myalgias. Skin: Negative. Allergic/Immunologic: Negative. Neurological: Negative. Hematological: Negative. Psychiatric/Behavioral: Positive for sleep disturbance. Negative for dysphoric mood. The patient is not nervous/anxious.         Objective:   Physical Exam  Constitutional:       General: She is not in acute distress. Appearance: She is well-developed. HENT:      Head: Normocephalic and atraumatic. Right Ear: External ear normal.      Left Ear: External ear normal.      Mouth/Throat:      Pharynx: No oropharyngeal exudate. Eyes:      General: No scleral icterus. Conjunctiva/sclera: Conjunctivae normal.   Neck:      Thyroid: No thyromegaly. Cardiovascular:      Rate and Rhythm: Normal rate and regular rhythm. Heart sounds: Normal heart sounds. No murmur heard. Pulmonary:      Effort: Pulmonary effort is normal. No respiratory distress. Breath sounds: Normal breath sounds. No wheezing. Abdominal:      General: Bowel sounds are normal. There is no distension. Palpations: Abdomen is soft. Tenderness: There is no abdominal tenderness. There is no rebound. Musculoskeletal:         General: No tenderness. Normal range of motion. Cervical back: Neck supple. Skin:     General: Skin is warm and dry. Findings: No erythema or rash. Neurological:      Mental Status: She is alert and oriented to person, place, and time. Psychiatric:         Behavior: Behavior normal.         Thought Content: Thought content normal.         Judgment: Judgment normal.     /72 (Site: Left Upper Arm, Position: Sitting, Cuff Size: Large Adult)   Pulse 72   Ht 5' 1\" (1.549 m)   Wt 184 lb (83.5 kg)   LMP  (LMP Unknown)   BMI 34.77 kg/m²        Assessment:       Encounter Diagnoses   Name Primary?     Coronary artery disease involving native coronary artery of native heart with angina pectoris ([facility] [address] Yes    Encounter for screening mammogram for breast cancer     Essential hypertension     Recurrent major depressive disorder, in full remission ([facility] [address]     Type 2 diabetes mellitus without complication, without long-term current use of insulin (HCC)     Hyperlipidemia, unspecified hyperlipidemia type     Obstructive sleep apnea     Gastroesophageal reflux disease without esophagitis  Hypomagnesemia     Class 2 obesity due to excess calories without serious comorbidity with body mass index (BMI) of 37.0 to 37.9 in adult     Lung nodule     Insomnia, unspecified type          Awaiting updated labs at present. Plan:          CAD: per most recent cardiology follow up: \" CAD: with multiple PCIs in the past last one 04/2013: Nuclear stress test 02/2016. Cardiac cath 8/16/18 for recurrent chest pain showed patent distal LAD and proximal ramus stents with otherwise non-obstructive CAD. Cardiac cath 9/22/2020 PTCA/ZINA of proximal LAD. Patent distal LAD stent. Distal OM2 lesion, unable to pass wire due to hairpin loop and unable to cross RPL lesion due to possible local bridging collaterals. \"  Rare , recurrent chest pain. Less than prior to sept. Stent. She uses ntg prn .no use in the last 6 months. Associated headache. Cont. Ranexa, lopressor, statin, imdur. abnormal mammogram: biopsy right breast mass at 9 o'clock position. Benign fibroadenoma. She had further excisional biopsy due to concerns for correct clip placement/differences in mammogram vs ultrasound locations. Pathology benign/negative for malignancy. Updated Mammogram 3/4/16 with lumpectomy changes without evidence for malignancy. 6 month mammogram 9/26/16: benign. Returning to annual screening. Last couple without concern. Cont. Annually. Due for update, will order.       Htn:  cont. Current dosing of meds:norvasc, cozaar, lopressor.      Depression and anxiety: no depression concerns. Anxiety at baseline at present. Cont. Citalopram at 40mg /day. Using ativan prn.  she reports winter blues. Doing ok at present.      Aodm:  She relates compliance with meds: metformin 500xr bid, amaryl 4mg/day, tradjenta 5 mg /day. She has had a couple of lows during the night down to '35\".   Adding a snack at bedtime and encouraging more bs checks in the evening and am.    Reminder for eye exam. Sensory exam of the foot is normal, tested with the monofilament. Good pulses, no lesions or ulcers, good peripheral pulses. Scheduling updated eye exam this visit.         Hyperlipidemia: on lipitor. Good control at present. Plan to cont. Same. labs pending.      Will: non compliant. She reports today that she doesn't have one. Insurance would not cover all of it. She couldn't afford it. Denies daytime somnolence. Consider purchasing own machine     Gerd: mild, intermittent symptoms, improved after getting rid of carbonated beverages. Doing ok with lifestyle mods at present.      Hypomagnesemia: she stopped the supplement and levels went low again. She has not restarted yet.      Obesity: wt. Fairly stable . She continues to try and loose wt.  rec. Restricted carb. Diet.         Lung nodule; Incidental lung nodules found on cxr and CT scan 8/25/14. Unknown significance. No thoracic lymph nodes. No smoking history  CT confirms a 10 mm nodule in the left upper lobe (series 3, image 36)    with an adjacent 4 mm at the cardiac border. Linear densities in the    right middle lobe and both lower lobes compatible with subsegmental    atelectasis versus scarring. No pulmonary infiltrate.        No pleural or pericardial effusion.        No enlarged thoracic lymph node by CT criteria.          Updated CT showed unchanged nodularity 3/10/15; IMPRESSION:    1. Unchanged pulmonary nodularity, largest 11 mm in the left upper lobe. Stable findings in the left hilum and left superior mediastinum, may    represent small lymph nodes though of uncertain etiology or significance. None of these are clearly calcified. PET/CT could be considered for    metabolic assessment and planning for any potential biopsy. Otherwise,    close CT followup would be recommended to ensure 2 years of stability.     2. Small hypodense thyroid nodules, which could be further    evaluated/followed up with ultrasound.               CT 7/30/18:     Impression    Stable CT with re- demonstration of few lung nodules largest again measuring    1.1 cm in the lingula with subtle calcification suggesting calcified    granuloma.  No acute process with few incidental/chronic findings as    described. Chronic insomnia: good initial response to trazodone. Starting to see decreased efficacy, likely due to tolerance. Increased to 100mg hs. Variable efficacy at present. Works mostly, some nights doesn't seem to work. Continues prn use. .       Discussed colonoscopy screening. Mammogram today. Willing to schedule c/s with dr. Lizeth Lagos, she has not followed through. Rescheduling her today. xz    Flu shot today    Seb. Keratosis lesion biceps wall of right axilla. Cryotherapy x 2 today.

## 2021-11-18 RX ORDER — GLIMEPIRIDE 4 MG/1
TABLET ORAL
Qty: 180 TABLET | Refills: 0 | Status: SHIPPED | OUTPATIENT
Start: 2021-11-18 | End: 2022-03-31

## 2021-11-18 NOTE — TELEPHONE ENCOUNTER
Salome Peña called requesting a refill of the below medication which has been pended for you:     Requested Prescriptions     Pending Prescriptions Disp Refills    glimepiride (AMARYL) 4 MG tablet [Pharmacy Med Name: Glimepiride 4 MG Oral Tablet] 180 tablet 0     Sig: TAKE 1 TABLET BY MOUTH TWICE DAILY WITH MEALS       Last Appointment Date: 11/11/2021  Next Appointment Date: 5/11/2022    Allergies   Allergen Reactions    Codeine Nausea And Vomiting    Morphine Nausea And Vomiting

## 2022-01-03 RX ORDER — RANOLAZINE 1000 MG/1
TABLET, EXTENDED RELEASE ORAL
Qty: 60 TABLET | Refills: 0 | Status: SHIPPED | OUTPATIENT
Start: 2022-01-03 | End: 2022-05-11 | Stop reason: SDUPTHER

## 2022-03-31 RX ORDER — GLIMEPIRIDE 4 MG/1
TABLET ORAL
Qty: 180 TABLET | Refills: 0 | Status: SHIPPED | OUTPATIENT
Start: 2022-03-31 | End: 2022-05-03

## 2022-03-31 RX ORDER — ATORVASTATIN CALCIUM 40 MG/1
TABLET, FILM COATED ORAL
Qty: 90 TABLET | Refills: 0 | Status: SHIPPED | OUTPATIENT
Start: 2022-03-31 | End: 2022-05-03

## 2022-05-03 RX ORDER — BUPROPION HYDROCHLORIDE 150 MG/1
TABLET, EXTENDED RELEASE ORAL
Qty: 30 TABLET | Refills: 0 | Status: SHIPPED | OUTPATIENT
Start: 2022-05-03 | End: 2022-05-11 | Stop reason: SDUPTHER

## 2022-05-03 RX ORDER — CITALOPRAM 40 MG/1
TABLET ORAL
Qty: 30 TABLET | Refills: 0 | Status: SHIPPED | OUTPATIENT
Start: 2022-05-03 | End: 2022-05-11 | Stop reason: SDUPTHER

## 2022-05-03 RX ORDER — CLOPIDOGREL BISULFATE 75 MG/1
TABLET ORAL
Qty: 30 TABLET | Refills: 0 | Status: SHIPPED | OUTPATIENT
Start: 2022-05-03 | End: 2022-05-11 | Stop reason: SDUPTHER

## 2022-05-03 RX ORDER — GLIMEPIRIDE 4 MG/1
TABLET ORAL
Qty: 60 TABLET | Refills: 0 | Status: SHIPPED | OUTPATIENT
Start: 2022-05-03 | End: 2022-05-11 | Stop reason: SDUPTHER

## 2022-05-03 RX ORDER — ATORVASTATIN CALCIUM 40 MG/1
TABLET, FILM COATED ORAL
Qty: 30 TABLET | Refills: 0 | Status: SHIPPED | OUTPATIENT
Start: 2022-05-03 | End: 2022-10-24

## 2022-05-03 NOTE — TELEPHONE ENCOUNTER
LM for patient to return call to see if she has enough medication until OV 5/11/22.  Also to remind patient of fasting labs to have completed

## 2022-05-03 NOTE — TELEPHONE ENCOUNTER
Lenin León called requesting a refill of the below medication which has been pended for you:     Dr. Castillo Sterling is out of the office, Patient does not have enough until next OV    Requested Prescriptions     Pending Prescriptions Disp Refills    atorvastatin (LIPITOR) 40 MG tablet [Pharmacy Med Name: Atorvastatin Calcium 40 MG Oral Tablet] 30 tablet 0     Sig: Take 1 tablet by mouth once daily    buPROPion (WELLBUTRIN SR) 150 MG extended release tablet [Pharmacy Med Name: buPROPion HCl ER (SR) 150 MG Oral Tablet Extended Release 12 Hour] 30 tablet 0     Sig: Take 1 tablet by mouth once daily    citalopram (CELEXA) 40 MG tablet [Pharmacy Med Name: Citalopram Hydrobromide 40 MG Oral Tablet] 30 tablet 0     Sig: Take 1 tablet by mouth once daily    clopidogrel (PLAVIX) 75 MG tablet [Pharmacy Med Name: Clopidogrel Bisulfate 75 MG Oral Tablet] 30 tablet 0     Sig: Take 1 tablet by mouth once daily    glimepiride (AMARYL) 4 MG tablet [Pharmacy Med Name: Glimepiride 4 MG Oral Tablet] 60 tablet 0     Sig: TAKE 1 TABLET BY MOUTH TWICE DAILY WITH MEALS       Last Appointment Date: 11/11/2021  Next Appointment Date: 5/11/2022    Allergies   Allergen Reactions    Codeine Nausea And Vomiting    Morphine Nausea And Vomiting

## 2022-05-03 NOTE — TELEPHONE ENCOUNTER
Patient states that she will not have enough medications until her appointment.    Patient was reminded to do lab work

## 2022-05-11 ENCOUNTER — OFFICE VISIT (OUTPATIENT)
Dept: FAMILY MEDICINE CLINIC | Age: 62
End: 2022-05-11
Payer: MEDICARE

## 2022-05-11 ENCOUNTER — HOSPITAL ENCOUNTER (OUTPATIENT)
Dept: LAB | Age: 62
Discharge: HOME | End: 2022-05-11
Payer: MEDICARE

## 2022-05-11 VITALS
HEIGHT: 61 IN | SYSTOLIC BLOOD PRESSURE: 130 MMHG | DIASTOLIC BLOOD PRESSURE: 80 MMHG | BODY MASS INDEX: 34.55 KG/M2 | WEIGHT: 183 LBS | HEART RATE: 72 BPM

## 2022-05-11 DIAGNOSIS — G47.00 INSOMNIA, UNSPECIFIED TYPE: ICD-10-CM

## 2022-05-11 DIAGNOSIS — Z12.31 ENCOUNTER FOR SCREENING MAMMOGRAM FOR BREAST CANCER: ICD-10-CM

## 2022-05-11 DIAGNOSIS — I10 ESSENTIAL HYPERTENSION: ICD-10-CM

## 2022-05-11 DIAGNOSIS — G47.33 OBSTRUCTIVE SLEEP APNEA: ICD-10-CM

## 2022-05-11 DIAGNOSIS — E83.42 HYPOMAGNESEMIA: ICD-10-CM

## 2022-05-11 DIAGNOSIS — E78.5 HYPERLIPIDEMIA, UNSPECIFIED HYPERLIPIDEMIA TYPE: ICD-10-CM

## 2022-05-11 DIAGNOSIS — I25.119 CORONARY ARTERY DISEASE INVOLVING NATIVE CORONARY ARTERY OF NATIVE HEART WITH ANGINA PECTORIS (HCC): Primary | ICD-10-CM

## 2022-05-11 DIAGNOSIS — E66.09 CLASS 2 OBESITY DUE TO EXCESS CALORIES WITHOUT SERIOUS COMORBIDITY WITH BODY MASS INDEX (BMI) OF 37.0 TO 37.9 IN ADULT: ICD-10-CM

## 2022-05-11 DIAGNOSIS — E11.9 TYPE 2 DIABETES MELLITUS WITHOUT COMPLICATION, WITHOUT LONG-TERM CURRENT USE OF INSULIN (HCC): ICD-10-CM

## 2022-05-11 DIAGNOSIS — F33.42 RECURRENT MAJOR DEPRESSIVE DISORDER, IN FULL REMISSION (HCC): ICD-10-CM

## 2022-05-11 DIAGNOSIS — R91.1 LUNG NODULE: ICD-10-CM

## 2022-05-11 DIAGNOSIS — K21.9 GASTROESOPHAGEAL REFLUX DISEASE WITHOUT ESOPHAGITIS: ICD-10-CM

## 2022-05-11 LAB
ABSOLUTE EOS #: 0.15 K/UL (ref 0–0.44)
ABSOLUTE IMMATURE GRANULOCYTE: 0.03 K/UL (ref 0–0.3)
ABSOLUTE LYMPH #: 1.93 K/UL (ref 1.1–3.7)
ABSOLUTE MONO #: 0.47 K/UL (ref 0.1–1.2)
ALBUMIN SERPL-MCNC: 3.9 G/DL (ref 3.5–5.2)
ALBUMIN/GLOBULIN RATIO: 1.3 (ref 1–2.5)
ALP BLD-CCNC: 95 U/L (ref 35–104)
ALT SERPL-CCNC: 16 U/L (ref 5–33)
ANION GAP SERPL CALCULATED.3IONS-SCNC: 10 MMOL/L (ref 9–17)
AST SERPL-CCNC: 13 U/L
BASOPHILS # BLD: 1 % (ref 0–2)
BASOPHILS ABSOLUTE: 0.05 K/UL (ref 0–0.2)
BILIRUB SERPL-MCNC: 0.47 MG/DL (ref 0.3–1.2)
BUN BLDV-MCNC: 16 MG/DL (ref 8–23)
BUN/CREAT BLD: 14 (ref 9–20)
CALCIUM SERPL-MCNC: 8.9 MG/DL (ref 8.6–10.4)
CHLORIDE BLD-SCNC: 102 MMOL/L (ref 98–107)
CHOLESTEROL/HDL RATIO: 3.6
CHOLESTEROL: 153 MG/DL
CO2: 27 MMOL/L (ref 20–31)
CREAT SERPL-MCNC: 1.11 MG/DL (ref 0.5–0.9)
EOSINOPHILS RELATIVE PERCENT: 2 % (ref 1–4)
GFR AFRICAN AMERICAN: >60 ML/MIN
GFR NON-AFRICAN AMERICAN: 50 ML/MIN
GFR SERPL CREATININE-BSD FRML MDRD: ABNORMAL ML/MIN/{1.73_M2}
GLUCOSE BLD-MCNC: 188 MG/DL (ref 70–99)
HCT VFR BLD CALC: 36.3 % (ref 36.3–47.1)
HDLC SERPL-MCNC: 42 MG/DL
HEMOGLOBIN: 12.1 G/DL (ref 11.9–15.1)
IMMATURE GRANULOCYTES: 0 %
LDL CHOLESTEROL: 90 MG/DL (ref 0–130)
LYMPHOCYTES # BLD: 26 % (ref 24–43)
MCH RBC QN AUTO: 31.8 PG (ref 25.2–33.5)
MCHC RBC AUTO-ENTMCNC: 33.3 G/DL (ref 25.2–33.5)
MCV RBC AUTO: 95.3 FL (ref 82.6–102.9)
MONOCYTES # BLD: 6 % (ref 3–12)
NRBC AUTOMATED: 0 PER 100 WBC
PDW BLD-RTO: 12.5 % (ref 11.8–14.4)
PLATELET # BLD: 272 K/UL (ref 138–453)
PMV BLD AUTO: 10.4 FL (ref 8.1–13.5)
POTASSIUM SERPL-SCNC: 4 MMOL/L (ref 3.7–5.3)
RBC # BLD: 3.81 M/UL (ref 3.95–5.11)
SEG NEUTROPHILS: 65 % (ref 36–65)
SEGMENTED NEUTROPHILS ABSOLUTE COUNT: 4.79 K/UL (ref 1.5–8.1)
SODIUM BLD-SCNC: 139 MMOL/L (ref 135–144)
TOTAL PROTEIN: 6.8 G/DL (ref 6.4–8.3)
TRIGL SERPL-MCNC: 104 MG/DL
WBC # BLD: 7.4 K/UL (ref 3.5–11.3)

## 2022-05-11 PROCEDURE — 83036 HEMOGLOBIN GLYCOSYLATED A1C: CPT

## 2022-05-11 PROCEDURE — 1036F TOBACCO NON-USER: CPT | Performed by: FAMILY MEDICINE

## 2022-05-11 PROCEDURE — 99213 OFFICE O/P EST LOW 20 MIN: CPT

## 2022-05-11 PROCEDURE — 99214 OFFICE O/P EST MOD 30 MIN: CPT | Performed by: FAMILY MEDICINE

## 2022-05-11 PROCEDURE — G8427 DOCREV CUR MEDS BY ELIG CLIN: HCPCS | Performed by: FAMILY MEDICINE

## 2022-05-11 PROCEDURE — G8417 CALC BMI ABV UP PARAM F/U: HCPCS | Performed by: FAMILY MEDICINE

## 2022-05-11 PROCEDURE — 80053 COMPREHEN METABOLIC PANEL: CPT

## 2022-05-11 PROCEDURE — 85025 COMPLETE CBC W/AUTO DIFF WBC: CPT

## 2022-05-11 PROCEDURE — 2022F DILAT RTA XM EVC RTNOPTHY: CPT | Performed by: FAMILY MEDICINE

## 2022-05-11 PROCEDURE — 36415 COLL VENOUS BLD VENIPUNCTURE: CPT

## 2022-05-11 PROCEDURE — 3046F HEMOGLOBIN A1C LEVEL >9.0%: CPT | Performed by: FAMILY MEDICINE

## 2022-05-11 PROCEDURE — 80061 LIPID PANEL: CPT

## 2022-05-11 PROCEDURE — 3017F COLORECTAL CA SCREEN DOC REV: CPT | Performed by: FAMILY MEDICINE

## 2022-05-11 RX ORDER — TRAZODONE HYDROCHLORIDE 100 MG/1
TABLET ORAL
Qty: 90 TABLET | Refills: 1 | Status: SHIPPED | OUTPATIENT
Start: 2022-05-11 | End: 2022-06-06

## 2022-05-11 RX ORDER — ESOMEPRAZOLE MAGNESIUM 40 MG/1
40 CAPSULE, DELAYED RELEASE ORAL DAILY
Qty: 30 CAPSULE | Refills: 3 | Status: SHIPPED | OUTPATIENT
Start: 2022-05-11

## 2022-05-11 RX ORDER — CITALOPRAM 40 MG/1
TABLET ORAL
Qty: 90 TABLET | Refills: 1 | Status: SHIPPED | OUTPATIENT
Start: 2022-05-11

## 2022-05-11 RX ORDER — LOSARTAN POTASSIUM 100 MG/1
TABLET ORAL
Qty: 90 TABLET | Refills: 1 | Status: SHIPPED | OUTPATIENT
Start: 2022-05-11 | End: 2022-09-06

## 2022-05-11 RX ORDER — METOPROLOL TARTRATE 100 MG/1
TABLET ORAL
Qty: 180 TABLET | Refills: 1 | Status: SHIPPED | OUTPATIENT
Start: 2022-05-11

## 2022-05-11 RX ORDER — EZETIMIBE 10 MG/1
10 TABLET ORAL DAILY
Qty: 90 TABLET | Refills: 1 | Status: SHIPPED | OUTPATIENT
Start: 2022-05-11

## 2022-05-11 RX ORDER — METFORMIN HYDROCHLORIDE 500 MG/1
TABLET, EXTENDED RELEASE ORAL
Qty: 180 TABLET | Refills: 1 | Status: SHIPPED | OUTPATIENT
Start: 2022-05-11

## 2022-05-11 RX ORDER — CLOPIDOGREL BISULFATE 75 MG/1
TABLET ORAL
Qty: 90 TABLET | Refills: 1 | Status: SHIPPED | OUTPATIENT
Start: 2022-05-11

## 2022-05-11 RX ORDER — BUPROPION HYDROCHLORIDE 150 MG/1
TABLET, EXTENDED RELEASE ORAL
Qty: 90 TABLET | Refills: 1 | Status: SHIPPED | OUTPATIENT
Start: 2022-05-11

## 2022-05-11 RX ORDER — HYDROCHLOROTHIAZIDE 50 MG/1
50 TABLET ORAL DAILY
Qty: 90 TABLET | Refills: 1 | Status: SHIPPED | OUTPATIENT
Start: 2022-05-11

## 2022-05-11 RX ORDER — RANOLAZINE 1000 MG/1
TABLET, EXTENDED RELEASE ORAL
Qty: 180 TABLET | Refills: 1 | Status: SHIPPED | OUTPATIENT
Start: 2022-05-11

## 2022-05-11 RX ORDER — GLIMEPIRIDE 4 MG/1
TABLET ORAL
Qty: 180 TABLET | Refills: 1 | Status: SHIPPED | OUTPATIENT
Start: 2022-05-11

## 2022-05-11 RX ORDER — AMLODIPINE BESYLATE 10 MG/1
TABLET ORAL
Qty: 90 TABLET | Refills: 1 | Status: SHIPPED | OUTPATIENT
Start: 2022-05-11

## 2022-05-11 RX ORDER — ISOSORBIDE MONONITRATE 30 MG/1
30 TABLET, EXTENDED RELEASE ORAL DAILY
Qty: 90 TABLET | Refills: 1 | Status: SHIPPED | OUTPATIENT
Start: 2022-05-11

## 2022-05-11 ASSESSMENT — ENCOUNTER SYMPTOMS
EYES NEGATIVE: 1
ALLERGIC/IMMUNOLOGIC NEGATIVE: 1
WHEEZING: 0
SHORTNESS OF BREATH: 0
DIARRHEA: 1

## 2022-05-11 NOTE — PROGRESS NOTES
Subjective:      Patient ID: Stewart Lundberg is a 65 y.o. female. Diabetes  Pertinent negatives for hypoglycemia include no nervousness/anxiousness. Associated symptoms include fatigue. Pertinent negatives for diabetes include no chest pain. Shoulder Pain     Leg Pain     Hypertension  Pertinent negatives include no chest pain, palpitations or shortness of breath. Hyperlipidemia  Associated symptoms include leg pain and myalgias. Pertinent negatives include no chest pain or shortness of breath. Coronary Artery Disease  Symptoms include leg swelling (mild, varies). Pertinent negatives include no chest pain, palpitations or shortness of breath. Risk factors include hyperlipidemia. Gastroesophageal Reflux  She reports no chest pain or no wheezing. Associated symptoms include fatigue. Other  Associated symptoms include arthralgias (right elbow off an on s/p surgery), fatigue and myalgias. Pertinent negatives include no chest pain. Routine follow up on chronic medical conditions, refills, and review of updated labs. I have reviewed the patient's medical history in detail and updated the computerized patient record. Currently living in hospitals with her parents. Step father with liver cancer and passed in April. She is staying with her mother 80 to help her out after the loss of her . Glucometer broke and not checking much recently,. No lows of concern. Using small snack at night. Feels she needs a new meter at present. Still hasnt gotten a new one. Depression good, no mood concerns. She has some winter time exacerbations and is watching things closely. Did well overall this winter. Overall doing well. Sleeping issues, mostly falling asleep taking an hour to 2 hours. Off caffeine. Has prn trazodone. . this is working well when needed. Compliant with medicaitons. Not compliant with cpap. She reports insurance wouldn't cover enough for her to afford.   Discussed buying her own machine. Some gi issues, stomach pain, diarrhea, heartburn, burping, in the last 3 months. No medication additions or changes to report. Can have several days without issue, then cramping, gas, and pain . Past Medical History:   Diagnosis Date    Anxiety     Coronary artery disease     with history of multiple chest pain episodes, MI ruled out, with stents placed in , , and  to the ramus artery, PTCA only to small diagonal.    Depression     with chronic dysthymia, prior intentional overdose on Ambien and Phenergan, history of agoraphobia symptoms.  Diabetes mellitus, type 2 ([facility] [address]     adult onset.  Examination of participant in clinical trial 14    End date 14    Gastroesophageal reflux disease     Hyperlipidemia     Hypertension     Hypomagnesemia     Insomnia     Normal left ventricular systolic function     Obesity     Obstructive sleep apnea     NO MACHINE USED    Sciatic nerve disease      Past Surgical History:   Procedure Laterality Date    BREAST BIOPSY Right 9-15-14    US guided -benign bx, mass in another location    CARDIAC CATHETERIZATION  2018    Patent distal LAD and proximal Ramus stents.  CARDIAC CATHETERIZATION  2020     SECTION       SECTION  1985    CHOLECYSTECTOMY  1992    CORON[facility]OPLASTY  2009    [facility]    CORONARY ANGIOPLASTY  2013    LAD--stent    CORONARY ANGIOPLASTY WITH STENT PLACEMENT  2011    LAD with diffuse plaque disease, left circumflex with mild irregularities, RCA with diffuse plaque disease, status post drug eluting stent to the proximal ramus.      CORONARY ANGIOPLASTY WITH STENT PLACEMENT  2008    LAD with minor proximal disease, distal smooth 50 to 60% stenosis, ramus intermedius with proximal 30 to 40% stenosis followed by a mid segment 90% stenosis, left circumflex was a relatively small vessel with ostial 50% stenosis, RCA was a large dominant vessel with mid segment irregularity and 30% stenosis, status post stent to the ramus intermedius.  CORONARY ANGIOPLASTY WITH STENT PLACEMENT  04/11/2013    LAD with minor proximal disease, distal smooth 50 to 60% stenosis, ramus intermedius with proximal 30 to 40% stenosis followed by a mid segment 90% stenosis, left circumflex was a relatively small vessel with ostial 50% stenosis, RCA was a large dominant vessel with mid segment irregularity and 30% stenosis, status post stent to the ramus intermedius.  CORONARY ANGIOPLASTY WITH STENT PLACEMENT  09/2009    LAD with minor proximal disease, distal smooth 50 to 60% stenosis, ramus intermedius with proximal 30 to 40% stenosis followed by a mid segment 90% stenosis, left circumflex was a relatively small vessel with ostial 50% stenosis, RCA was a large dominant vessel with mid segment irregularity and 30% stenosis, status post stent to the ramus intermedius.  FINGER TRIGGER RELEASE Left 7/13/2015    FRACTURE SURGERY Right 10/1998    ORIF, elbow, supracondylar fracture.  HYSTERECTOMY  1986    ovaries spared.  MOUTH SURGERY      teeth pulled for dentures    UPPER GASTROINTESTINAL ENDOSCOPY  08/09/2005    erythema and congestion in the antrum compatible with mild gastritis, polyps in the stomach body.       Current Outpatient Medications   Medication Sig Dispense Refill    atorvastatin (LIPITOR) 40 MG tablet Take 1 tablet by mouth once daily 30 tablet 0    buPROPion (WELLBUTRIN SR) 150 MG extended release tablet Take 1 tablet by mouth once daily 30 tablet 0    citalopram (CELEXA) 40 MG tablet Take 1 tablet by mouth once daily 30 tablet 0    clopidogrel (PLAVIX) 75 MG tablet Take 1 tablet by mouth once daily 30 tablet 0    glimepiride (AMARYL) 4 MG tablet TAKE 1 TABLET BY MOUTH TWICE DAILY WITH MEALS 60 tablet 0    ranolazine (RANEXA) 1000 MG extended release tablet Take 1 tablet by mouth twice daily 60 tablet 0    traZODone (DESYREL) 100 MG tablet Take 1 tablet by mouth nightly 90 tablet 1    linagliptin (TRADJENTA) 5 MG tablet TAKE 1 TABLET BY MOUTH ONCE DAILY 90 tablet 1    isosorbide mononitrate (IMDUR) 30 MG extended release tablet Take 1 tablet by mouth daily 90 tablet 1    losartan (COZAAR) 100 MG tablet TAKE ONE TABLET BY MOUTH ONCE DAILY 90 tablet 1    metoprolol (LOPRESSOR) 100 MG tablet Take 1 tablet by mouth twice daily 180 tablet 1    hydroCHLOROthiazide (HYDRODIURIL) 50 MG tablet Take 1 tablet by mouth daily 30 tablet 3    ezetimibe (ZETIA) 10 MG tablet Take 1 tablet by mouth daily 90 tablet 1    amLODIPine (NORVASC) 10 MG tablet TAKE 1 TABLET BY MOUTH ONCE DAILY 90 tablet 1    nitroGLYCERIN (NITROSTAT) 0.4 MG SL tablet Place 1 tablet under the tongue every 5 minutes as needed for Chest pain 25 tablet 1    aspirin 81 MG tablet Take 81 mg by mouth daily.  metFORMIN (GLUCOPHAGE-XR) 500 MG extended release tablet 1 po  tablet 1    glucose monitoring (FREESTYLE FREEDOM) kit 1 kit by Does not apply route daily 1 kit 0    Blood Glucose Monitoring Suppl (ACCU-CHEK GUIDE ME) w/Device KIT USE TO CHECK GLUCOSE ONCE DAILY 1 kit 0    blood glucose monitor strips Test 1 time a day 200 strip 3    Lancets MISC Lancet device and lancets. Test 2 times an day and prn. Type II Diabetes uncontrolled not on insulin 200 each 3     No current facility-administered medications for this visit. Allergies   Allergen Reactions    Codeine Nausea And Vomiting    Morphine Nausea And Vomiting       Review of Systems   Constitutional: Positive for fatigue. HENT: Negative. Eyes: Negative. Respiratory: Negative for shortness of breath and wheezing. Cardiovascular: Positive for leg swelling (mild, varies). Negative for chest pain and palpitations. Gastrointestinal: Positive for diarrhea (3 months). Endocrine: Negative. Genitourinary: Negative.     Musculoskeletal: Positive for arthralgias (right elbow off an on s/p surgery) and myalgias. Skin: Negative. Allergic/Immunologic: Negative. Neurological: Negative. Hematological: Negative. Psychiatric/Behavioral: Positive for sleep disturbance. Negative for dysphoric mood. The patient is not nervous/anxious. Objective:   Physical Exam  Constitutional:       General: She is not in acute distress. Appearance: She is well-developed. HENT:      Head: Normocephalic and atraumatic. Right Ear: External ear normal.      Left Ear: External ear normal.      Mouth/Throat:      Pharynx: No oropharyngeal exudate. Eyes:      General: No scleral icterus. Conjunctiva/sclera: Conjunctivae normal.   Neck:      Thyroid: No thyromegaly. Cardiovascular:      Rate and Rhythm: Normal rate and regular rhythm. Heart sounds: Normal heart sounds. No murmur heard. Pulmonary:      Effort: Pulmonary effort is normal. No respiratory distress. Breath sounds: Normal breath sounds. No wheezing. Abdominal:      General: Bowel sounds are normal. There is no distension. Palpations: Abdomen is soft. Tenderness: There is no abdominal tenderness. There is no rebound. Musculoskeletal:         General: No tenderness. Normal range of motion. Cervical back: Neck supple. Skin:     General: Skin is warm and dry. Findings: No erythema or rash. Neurological:      Mental Status: She is alert and oriented to person, place, and time. Psychiatric:         Behavior: Behavior normal.         Thought Content:  Thought content normal.         Judgment: Judgment normal.     /80 (Site: Left Upper Arm, Position: Sitting, Cuff Size: Large Adult)   Pulse 72   Ht 5' 1\" (1.549 m)   Wt 183 lb (83 kg)   LMP  (LMP Unknown)   BMI 34.58 kg/m²    Hospital Outpatient Visit on 05/11/2022   Component Date Value Ref Range Status    Glucose 05/11/2022 188* 70 - 99 mg/dL Final    BUN 05/11/2022 16  8 - 23 mg/dL Final    CREATININE 05/11/2022 1.11* 0.50 - 0.90 mg/dL Final    Bun/Cre Ratio 05/11/2022 14  9 - 20 Final    Calcium 05/11/2022 8.9  8.6 - 10.4 mg/dL Final    Sodium 05/11/2022 139  135 - 144 mmol/L Final    Potassium 05/11/2022 4.0  3.7 - 5.3 mmol/L Final    Chloride 05/11/2022 102  98 - 107 mmol/L Final    CO2 05/11/2022 27  20 - 31 mmol/L Final    Anion Gap 05/11/2022 10  9 - 17 mmol/L Final    Alkaline Phosphatase 05/11/2022 95  35 - 104 U/L Final    ALT 05/11/2022 16  5 - 33 U/L Final    AST 05/11/2022 13  <32 U/L Final    Total Bilirubin 05/11/2022 0.47  0.3 - 1.2 mg/dL Final    Total Protein 05/11/2022 6.8  6.4 - 8.3 g/dL Final    Albumin 05/11/2022 3.9  3.5 - 5.2 g/dL Final    Albumin/Globulin Ratio 05/11/2022 1.3  1.0 - 2.5 Final    GFR Non- 05/11/2022 50* >60 mL/min Final    GFR  05/11/2022 >60  >60 mL/min Final    GFR Comment 05/11/2022        Final    WBC 05/11/2022 7.4  3.5 - 11.3 k/uL Final    RBC 05/11/2022 3.81* 3.95 - 5.11 m/uL Final    Hemoglobin 05/11/2022 12.1  11.9 - 15.1 g/dL Final    Hematocrit 05/11/2022 36.3  36.3 - 47.1 % Final    MCV 05/11/2022 95.3  82.6 - 102.9 fL Final    MCH 05/11/2022 31.8  25.2 - 33.5 pg Final    MCHC 05/11/2022 33.3  25.2 - 33.5 g/dL Final    RDW 05/11/2022 12.5  11.8 - 14.4 % Final    Platelets 66/72/7956 272  138 - 453 k/uL Final    MPV 05/11/2022 10.4  8.1 - 13.5 fL Final    NRBC Automated 05/11/2022 0.0  0.0 per 100 WBC Final    Seg Neutrophils 05/11/2022 65  36 - 65 % Final    Lymphocytes 05/11/2022 26  24 - 43 % Final    Monocytes 05/11/2022 6  3 - 12 % Final    Eosinophils % 05/11/2022 2  1 - 4 % Final    Basophils 05/11/2022 1  0 - 2 % Final    Immature Granulocytes 05/11/2022 0  0 % Final    Segs Absolute 05/11/2022 4.79  1.50 - 8.10 k/uL Final    Absolute Lymph # 05/11/2022 1.93  1.10 - 3.70 k/uL Final    Absolute Mono # 05/11/2022 0.47  0.10 - 1.20 k/uL Final    Absolute Eos # 05/11/2022 0.15  0.00 - 0.44 k/uL Final    Basophils Absolute 05/11/2022 0.05  0.00 - 0.20 k/uL Final    Absolute Immature Granulocyte 05/11/2022 0.03  0.00 - 0.30 k/uL Final         Assessment:       Encounter Diagnoses   Name Primary?  Coronary artery disease involving native coronary artery of native heart with angina pectoris ([facility] [address] Yes    Encounter for screening mammogram for breast cancer     Essential hypertension     [facility]     Type 2 diabetes mellitus without complication, without long-term current use of insulin (HCC)     Hyperlipidemia, unspecified hyperlipidemia type     Obstructive sleep apnea     Gastroesophageal reflux disease without esophagitis     Hypomagnesemia     Class 2 obesity due to excess calories without serious comorbidity with body mass index (BMI) of 37.0 to 37.9 in adult     Lung nodule     Insomnia, unspecified type            Awaiting updated labs at present. Plan:          CAD: per most recent cardiology follow up: \" CAD: with multiple PCIs in the past last one 04/2013: Nuclear stress test 02/2016. Cardiac cath 8/16/18 for recurrent chest pain showed patent distal LAD and proximal ramus stents with otherwise non-obstructive CAD. Cardiac cath 9/22/2020 PTCA/ZINA of proximal LAD. Patent distal LAD stent. Distal OM2 lesion, unable to pass wire due to hairpin loop and unable to cross RPL lesion due to possible local bridging collaterals. \"  Rare , recurrent chest pain. Less than prior to sept. Stent. She uses ntg prn .no use in the last 6 months. Associated headache. Cont. Ranexa, lopressor, statin, imdur. Rec. Follow up with cardiology. She relates having issues with cardiology not answering phone?    abnormal mammogram: biopsy right breast mass at 9 o'clock position. Benign fibroadenoma. She had further excisional biopsy due to concerns for correct clip placement/differences in mammogram vs ultrasound locations. Pathology benign/negative for malignancy. Updated Mammogram 3/4/16 with lumpectomy changes without evidence for malignancy. 6 month mammogram 9/26/16: benign. Returning to annual screening. Last couple without concern. Cont. Annually. Due for update, will order.       Htn:  cont. Current dosing of meds:norvasc, cozaar, lopressor.      Depression and anxiety: no depression concerns. Anxiety at baseline at present. Cont. Citalopram at 40mg /day. Using ativan prn.  she reports winter blues. Doing ok at present.      Aodm:  She relates compliance with meds: metformin 500xr bid, amaryl 4mg/day, tradjenta 5 mg /day. She has had a couple of lows during the night down to '35\". Adding a snack at bedtime and encouraging more bs checks in the evening and am.    Reminder for eye exam. Sensory exam of the foot is normal, tested with the monofilament. Good pulses, no lesions or ulcers, good peripheral pulses. Awaiting a1c, sent to [facility] today and not available at the time of appt.        Hyperlipidemia: on lipitor. Good control at present. Plan to cont. Same. labs pending.      Will: non compliant. She reports today that she doesn't have one. Insurance would not cover all of it. She couldn't afford it. Denies daytime somnolence. Consider purchasing own machine     Gerd: mild, intermittent symptoms, improved after getting rid of carbonated beverages. Doing ok with lifestyle mods at present. Recent upper gi symptoms and diarrhea/gas/bloating in the last 3 months. Recurrent heartburn regularly. Adding nexium daily. Hopefully see improvement in both upper and lower symptoms.       Hypomagnesemia: she stopped the supplement and levels went low again. She has not restarted yet.      Obesity: wt. Fairly stable . She continues to try and loose wt.  rec. Restricted carb. Diet.         Lung nodule; Incidental lung nodules found on cxr and CT scan 8/25/14. Unknown significance. No thoracic lymph nodes.  No smoking history  CT confirms a 10 mm nodule in the left upper lobe (series 3, image 36)    with an adjacent 4 mm at the cardiac border. Linear densities in the    right middle lobe and both lower lobes compatible with subsegmental    atelectasis versus scarring. No pulmonary infiltrate.        No pleural or pericardial effusion.        No enlarged thoracic lymph node by CT criteria.          Updated CT showed unchanged nodularity 3/10/15; IMPRESSION:    1. Unchanged pulmonary nodularity, largest 11 mm in the left upper lobe. Stable findings in the left hilum and left superior mediastinum, may    represent small lymph nodes though of uncertain etiology or significance. None of these are clearly calcified. PET/CT could be considered for    metabolic assessment and planning for any potential biopsy. Otherwise,    close CT followup would be recommended to ensure 2 years of stability. 2. Small hypodense thyroid nodules, which could be further    evaluated/followed up with ultrasound.               CT 7/30/18:     Impression    Stable CT with re- demonstration of few lung nodules largest again measuring    1.1 cm in the lingula with subtle calcification suggesting calcified    granuloma.  No acute process with few incidental/chronic findings as    described. Chronic insomnia: good initial response to trazodone. Starting to see decreased efficacy, likely due to tolerance. Increased to 100mg hs. Variable efficacy at present. Works mostly, some nights doesn't seem to work. Continues prn use. .       Discussed colonoscopy screening. Mammogram today. Willing to schedule c/s with dr. Lida Middleton, she has not followed through. Scheduled twice without follow through. Encouraged completion. Flu shot today    Seb. Keratosis lesion biceps wall of right axilla. Cryotherapy x 2 today.

## 2022-05-12 LAB
ESTIMATED AVERAGE GLUCOSE: 206 MG/DL
HBA1C MFR BLD: 8.8 % (ref 4–6)

## 2022-06-06 RX ORDER — TRAZODONE HYDROCHLORIDE 100 MG/1
TABLET ORAL
Qty: 90 TABLET | Refills: 0 | Status: SHIPPED | OUTPATIENT
Start: 2022-06-06

## 2022-07-13 DIAGNOSIS — Z12.31 ENCOUNTER FOR SCREENING MAMMOGRAM FOR MALIGNANT NEOPLASM OF BREAST: Primary | ICD-10-CM

## 2022-08-09 ENCOUNTER — OFFICE VISIT (OUTPATIENT)
Dept: FAMILY MEDICINE CLINIC | Age: 62
End: 2022-08-09
Payer: MEDICARE

## 2022-08-09 ENCOUNTER — IMMUNIZATION (OUTPATIENT)
Dept: LAB | Age: 62
End: 2022-08-09
Payer: MEDICARE

## 2022-08-09 ENCOUNTER — TELEPHONE (OUTPATIENT)
Dept: FAMILY MEDICINE CLINIC | Age: 62
End: 2022-08-09

## 2022-08-09 ENCOUNTER — TELEPHONE (OUTPATIENT)
Dept: SURGERY | Age: 62
End: 2022-08-09

## 2022-08-09 ENCOUNTER — HOSPITAL ENCOUNTER (OUTPATIENT)
Dept: MAMMOGRAPHY | Age: 62
Discharge: HOME | End: 2022-08-11
Payer: MEDICARE

## 2022-08-09 ENCOUNTER — OFFICE VISIT (OUTPATIENT)
Dept: CARDIOLOGY | Age: 62
End: 2022-08-09
Payer: MEDICARE

## 2022-08-09 VITALS
WEIGHT: 172 LBS | SYSTOLIC BLOOD PRESSURE: 130 MMHG | HEART RATE: 72 BPM | DIASTOLIC BLOOD PRESSURE: 68 MMHG | BODY MASS INDEX: 32.47 KG/M2 | HEIGHT: 61 IN

## 2022-08-09 VITALS
HEIGHT: 61 IN | DIASTOLIC BLOOD PRESSURE: 63 MMHG | BODY MASS INDEX: 32.47 KG/M2 | SYSTOLIC BLOOD PRESSURE: 135 MMHG | WEIGHT: 172 LBS | HEART RATE: 69 BPM

## 2022-08-09 VITALS — HEIGHT: 61 IN | WEIGHT: 177 LBS | BODY MASS INDEX: 33.42 KG/M2

## 2022-08-09 DIAGNOSIS — I10 ESSENTIAL HYPERTENSION: ICD-10-CM

## 2022-08-09 DIAGNOSIS — I47.1 PAT (PAROXYSMAL ATRIAL TACHYCARDIA) (HCC): ICD-10-CM

## 2022-08-09 DIAGNOSIS — E78.00 PURE HYPERCHOLESTEROLEMIA: ICD-10-CM

## 2022-08-09 DIAGNOSIS — E78.5 HYPERLIPIDEMIA, UNSPECIFIED HYPERLIPIDEMIA TYPE: Primary | ICD-10-CM

## 2022-08-09 DIAGNOSIS — Z98.890 S/P CARDIAC CATH: ICD-10-CM

## 2022-08-09 DIAGNOSIS — E11.9 TYPE 2 DIABETES MELLITUS WITHOUT COMPLICATION, WITHOUT LONG-TERM CURRENT USE OF INSULIN (HCC): ICD-10-CM

## 2022-08-09 DIAGNOSIS — E78.5 HYPERLIPIDEMIA, UNSPECIFIED HYPERLIPIDEMIA TYPE: ICD-10-CM

## 2022-08-09 DIAGNOSIS — E66.09 CLASS 1 OBESITY DUE TO EXCESS CALORIES WITH SERIOUS COMORBIDITY IN ADULT, UNSPECIFIED BMI: ICD-10-CM

## 2022-08-09 DIAGNOSIS — E11.9 CONTROLLED TYPE 2 DIABETES MELLITUS WITHOUT COMPLICATION, UNSPECIFIED WHETHER LONG TERM INSULIN USE (HCC): ICD-10-CM

## 2022-08-09 DIAGNOSIS — Z12.11 ENCOUNTER FOR SCREENING COLONOSCOPY: ICD-10-CM

## 2022-08-09 DIAGNOSIS — Z12.31 ENCOUNTER FOR SCREENING MAMMOGRAM FOR MALIGNANT NEOPLASM OF BREAST: ICD-10-CM

## 2022-08-09 DIAGNOSIS — I25.119 CORONARY ARTERY DISEASE INVOLVING NATIVE CORONARY ARTERY OF NATIVE HEART WITH ANGINA PECTORIS (HCC): ICD-10-CM

## 2022-08-09 DIAGNOSIS — G47.00 INSOMNIA, UNSPECIFIED TYPE: ICD-10-CM

## 2022-08-09 DIAGNOSIS — E83.42 HYPOMAGNESEMIA: ICD-10-CM

## 2022-08-09 DIAGNOSIS — F33.42 RECURRENT MAJOR DEPRESSIVE DISORDER, IN FULL REMISSION (HCC): ICD-10-CM

## 2022-08-09 DIAGNOSIS — E66.09 CLASS 2 OBESITY DUE TO EXCESS CALORIES WITHOUT SERIOUS COMORBIDITY WITH BODY MASS INDEX (BMI) OF 37.0 TO 37.9 IN ADULT: ICD-10-CM

## 2022-08-09 DIAGNOSIS — K21.9 GASTROESOPHAGEAL REFLUX DISEASE WITHOUT ESOPHAGITIS: ICD-10-CM

## 2022-08-09 DIAGNOSIS — I25.119 CORONARY ARTERY DISEASE INVOLVING NATIVE CORONARY ARTERY OF NATIVE HEART WITH ANGINA PECTORIS (HCC): Primary | ICD-10-CM

## 2022-08-09 DIAGNOSIS — Z23 NEED FOR VACCINATION: Primary | ICD-10-CM

## 2022-08-09 DIAGNOSIS — Z95.5 S/P CORONARY ARTERY STENT PLACEMENT: ICD-10-CM

## 2022-08-09 DIAGNOSIS — N63.0 LUMP OF BREAST: ICD-10-CM

## 2022-08-09 DIAGNOSIS — I25.10 CORONARY ARTERY DISEASE INVOLVING NATIVE CORONARY ARTERY OF NATIVE HEART WITHOUT ANGINA PECTORIS: ICD-10-CM

## 2022-08-09 DIAGNOSIS — G47.33 OBSTRUCTIVE SLEEP APNEA: ICD-10-CM

## 2022-08-09 DIAGNOSIS — I20.9 TYPICAL ANGINA (HCC): ICD-10-CM

## 2022-08-09 DIAGNOSIS — I10 HYPERTENSION, ESSENTIAL: ICD-10-CM

## 2022-08-09 DIAGNOSIS — R91.1 LUNG NODULE: ICD-10-CM

## 2022-08-09 PROCEDURE — 1036F TOBACCO NON-USER: CPT | Performed by: FAMILY MEDICINE

## 2022-08-09 PROCEDURE — 1036F TOBACCO NON-USER: CPT | Performed by: INTERNAL MEDICINE

## 2022-08-09 PROCEDURE — G8417 CALC BMI ABV UP PARAM F/U: HCPCS | Performed by: INTERNAL MEDICINE

## 2022-08-09 PROCEDURE — 91309 COVID-19, MODERNA PURPLE BORDER, (AGE 18Y+ BOOSTER, 6Y-11Y SERIES), IM, 50 MCG/0.5 ML: CPT

## 2022-08-09 PROCEDURE — 99214 OFFICE O/P EST MOD 30 MIN: CPT | Performed by: INTERNAL MEDICINE

## 2022-08-09 PROCEDURE — 99214 OFFICE O/P EST MOD 30 MIN: CPT | Performed by: FAMILY MEDICINE

## 2022-08-09 PROCEDURE — G8417 CALC BMI ABV UP PARAM F/U: HCPCS | Performed by: FAMILY MEDICINE

## 2022-08-09 PROCEDURE — 77063 BREAST TOMOSYNTHESIS BI: CPT

## 2022-08-09 PROCEDURE — G8427 DOCREV CUR MEDS BY ELIG CLIN: HCPCS | Performed by: FAMILY MEDICINE

## 2022-08-09 PROCEDURE — PBSHW COVID-19, MODERNA PURPLE BORDER, (AGE 18Y+ BOOSTER, 6Y-11Y SERIES), IM, 50 MCG/0.5 ML: Performed by: FAMILY MEDICINE

## 2022-08-09 PROCEDURE — 3017F COLORECTAL CA SCREEN DOC REV: CPT | Performed by: FAMILY MEDICINE

## 2022-08-09 PROCEDURE — 2022F DILAT RTA XM EVC RTNOPTHY: CPT | Performed by: INTERNAL MEDICINE

## 2022-08-09 PROCEDURE — G8427 DOCREV CUR MEDS BY ELIG CLIN: HCPCS | Performed by: INTERNAL MEDICINE

## 2022-08-09 PROCEDURE — 3052F HG A1C>EQUAL 8.0%<EQUAL 9.0%: CPT | Performed by: FAMILY MEDICINE

## 2022-08-09 PROCEDURE — 93005 ELECTROCARDIOGRAM TRACING: CPT | Performed by: INTERNAL MEDICINE

## 2022-08-09 PROCEDURE — 3017F COLORECTAL CA SCREEN DOC REV: CPT | Performed by: INTERNAL MEDICINE

## 2022-08-09 PROCEDURE — 2022F DILAT RTA XM EVC RTNOPTHY: CPT | Performed by: FAMILY MEDICINE

## 2022-08-09 PROCEDURE — 3052F HG A1C>EQUAL 8.0%<EQUAL 9.0%: CPT | Performed by: INTERNAL MEDICINE

## 2022-08-09 PROCEDURE — 93010 ELECTROCARDIOGRAM REPORT: CPT | Performed by: INTERNAL MEDICINE

## 2022-08-09 RX ORDER — BLOOD-GLUCOSE METER
1 KIT MISCELLANEOUS DAILY
Qty: 1 KIT | Refills: 0 | Status: SHIPPED | OUTPATIENT
Start: 2022-08-09

## 2022-08-09 RX ORDER — NITROGLYCERIN 0.4 MG/1
0.4 TABLET SUBLINGUAL EVERY 5 MIN PRN
Qty: 25 TABLET | Refills: 3 | Status: SHIPPED | OUTPATIENT
Start: 2022-08-09

## 2022-08-09 RX ORDER — NITROGLYCERIN 0.4 MG/1
0.4 TABLET SUBLINGUAL EVERY 5 MIN PRN
Qty: 25 TABLET | Refills: 1 | Status: SHIPPED | OUTPATIENT
Start: 2022-08-09

## 2022-08-09 ASSESSMENT — PATIENT HEALTH QUESTIONNAIRE - PHQ9
2. FEELING DOWN, DEPRESSED OR HOPELESS: 0
9. THOUGHTS THAT YOU WOULD BE BETTER OFF DEAD, OR OF HURTING YOURSELF: 0
SUM OF ALL RESPONSES TO PHQ QUESTIONS 1-9: 0
1. LITTLE INTEREST OR PLEASURE IN DOING THINGS: 0
SUM OF ALL RESPONSES TO PHQ QUESTIONS 1-9: 0
7. TROUBLE CONCENTRATING ON THINGS, SUCH AS READING THE NEWSPAPER OR WATCHING TELEVISION: 0
3. TROUBLE FALLING OR STAYING ASLEEP: 0
4. FEELING TIRED OR HAVING LITTLE ENERGY: 0
10. IF YOU CHECKED OFF ANY PROBLEMS, HOW DIFFICULT HAVE THESE PROBLEMS MADE IT FOR YOU TO DO YOUR WORK, TAKE CARE OF THINGS AT HOME, OR GET ALONG WITH OTHER PEOPLE: 0
5. POOR APPETITE OR OVEREATING: 0
8. MOVING OR SPEAKING SO SLOWLY THAT OTHER PEOPLE COULD HAVE NOTICED. OR THE OPPOSITE, BEING SO FIGETY OR RESTLESS THAT YOU HAVE BEEN MOVING AROUND A LOT MORE THAN USUAL: 0
SUM OF ALL RESPONSES TO PHQ QUESTIONS 1-9: 0
6. FEELING BAD ABOUT YOURSELF - OR THAT YOU ARE A FAILURE OR HAVE LET YOURSELF OR YOUR FAMILY DOWN: 0
SUM OF ALL RESPONSES TO PHQ QUESTIONS 1-9: 0
SUM OF ALL RESPONSES TO PHQ9 QUESTIONS 1 & 2: 0

## 2022-08-09 ASSESSMENT — ENCOUNTER SYMPTOMS
DIARRHEA: 1
WHEEZING: 0
EYES NEGATIVE: 1
ALLERGIC/IMMUNOLOGIC NEGATIVE: 1
SHORTNESS OF BREATH: 0

## 2022-08-09 NOTE — PATIENT INSTRUCTIONS
Hospital Outpatient Visit on 05/11/2022   Component Date Value Ref Range Status    Hemoglobin A1C 05/11/2022 8.8 (A) 4.0 - 6.0 % Final    Estimated Avg Glucose 05/11/2022 206  mg/dL Final    Comment: The ADA and AACC recommend providing the estimated average glucose result to permit better   patient understanding of their HBA1c result. Cholesterol 05/11/2022 153  <200 mg/dL Final    Comment:    Cholesterol Guidelines:      <200  Desirable   200-240  Borderline      >240  Undesirable         HDL 05/11/2022 42  >40 mg/dL Final    Comment:    HDL Guidelines:    <40     Undesirable   40-59    Borderline    >59     Desirable         LDL Cholesterol 05/11/2022 90  0 - 130 mg/dL Final    Comment:    LDL Guidelines:     <100    Desirable   100-129   Near to/above Desirable   130-159   Borderline      >159   Undesirable     Direct (measured) LDL and calculated LDL are not interchangeable tests. Chol/HDL Ratio 05/11/2022 3.6  <5 Final            Triglycerides 05/11/2022 104  <150 mg/dL Final    Comment:    Triglyceride Guidelines:     <150   Desirable   150-199  Borderline   200-499  High     >499   Very high   Based on AHA Guidelines for fasting triglyceride, October 2012.          Glucose 05/11/2022 188 (A) 70 - 99 mg/dL Final    BUN 05/11/2022 16  8 - 23 mg/dL Final    Creatinine 05/11/2022 1.11 (A) 0.50 - 0.90 mg/dL Final    Bun/Cre Ratio 05/11/2022 14  9 - 20 Final    Calcium 05/11/2022 8.9  8.6 - 10.4 mg/dL Final    Sodium 05/11/2022 139  135 - 144 mmol/L Final    Potassium 05/11/2022 4.0  3.7 - 5.3 mmol/L Final    Chloride 05/11/2022 102  98 - 107 mmol/L Final    CO2 05/11/2022 27  20 - 31 mmol/L Final    Anion Gap 05/11/2022 10  9 - 17 mmol/L Final    Alkaline Phosphatase 05/11/2022 95  35 - 104 U/L Final    ALT 05/11/2022 16  5 - 33 U/L Final    AST 05/11/2022 13  <32 U/L Final    Total Bilirubin 05/11/2022 0.47  0.3 - 1.2 mg/dL Final    Total Protein 05/11/2022 6.8  6.4 - 8.3 g/dL Final    Albumin

## 2022-08-09 NOTE — PROGRESS NOTES
Today's Date: 2022  Patient's Name: Filippo Fitch  Patient's age: 65 y.o., 1960    HPI and CC:  Filippo Fitch  is her for follow up. She had COVID infection in 2021. She denies any dyspnea. She reports mild chest discomfort at time not related to exertion. She reports mild edema (not present today). No PND, no syncope or pre-syncope, no orthopnea. Past Medical History:   has a past medical history of Anxiety, Coronary artery disease, Depression, Diabetes mellitus, type 2 ([facility] [address] Examination of participant in clinical trial, Gastroesophageal reflux disease, Hyperlipidemia, Hypertension, Hypomagnesemia, Insomnia, Normal left ventricular systolic function, Obesity, Obstructive sleep apnea, and Sciatic nerve disease. Past Surgical History:   has a past surgical history that includes Hysterectomy (); fracture surgery (Right, 10/1998); Upper gastrointestinal endoscopy (2005);  section ();  section (1985); Finger trigger release (Left, 2015); Coronary angioplasty (2009); Coronary angioplasty (2013); Cholecystectomy (); Mouth surgery; Cardiac catheterization (2018); Cardiac catheterization (2020); Coronary angioplasty with stent (2011); Coronary angioplasty with stent (2008); Coronary angioplasty with stent (2013); Coronary angioplasty with stent (2009); and Breast biopsy (Right, 9-15-14). Home Medications:  Prior to Admission medications    Medication Sig Start Date End Date Taking?  Authorizing Provider   traZODone (DESYREL) 100 MG tablet Take 1 tablet by mouth nightly 22  Yes Leticia Clayton MD   metFORMIN (GLUCOPHAGE-XR) 500 MG extended release tablet 1 po BID 22  Yes Leticia Clayton MD   linagliptin (TRADJENTA) 5 MG tablet TAKE 1 TABLET BY MOUTH ONCE DAILY 22  Yes Leticia Clayton MD   isosorbide mononitrate (IMDUR) 30 MG extended release tablet Take 1 tablet by mouth daily 22  Yes Esau Duane Mikel Padron MD   losartan (COZAAR) 100 MG tablet TAKE ONE TABLET BY MOUTH ONCE DAILY 5/11/22  Yes Lynsey Gardner MD   metoprolol (LOPRESSOR) 100 MG tablet Take 1 tablet by mouth twice daily 5/11/22  Yes Lynsey Gardner MD   hydroCHLOROthiazide (HYDRODIURIL) 50 MG tablet Take 1 tablet by mouth daily 5/11/22  Yes Lynsey Gardner MD   ezetimibe (ZETIA) 10 MG tablet Take 1 tablet by mouth daily 5/11/22  Yes Lynsey Gardner MD   amLODIPine (NORVASC) 10 MG tablet TAKE 1 TABLET BY MOUTH ONCE DAILY 5/11/22  Yes Lynsey Gardner MD   ranolazine (RANEXA) 1000 MG extended release tablet Take 1 tablet by mouth twice daily 5/11/22  Yes Lynsey Gardner MD   glimepiride (AMARYL) 4 MG tablet TAKE 1 TABLET BY MOUTH TWICE DAILY WITH MEALS 5/11/22  Yes Lynsey Gardner MD   clopidogrel (PLAVIX) 75 MG tablet Take 1 tablet by mouth once daily 5/11/22  Yes Lynsey Gardner MD   citalopram (CELEXA) 40 MG tablet Take 1 tablet by mouth once daily 5/11/22  Yes Lynsey Gardner MD   buPROPion [facility]) 150 MG extended release tablet Take 1 tablet by mouth once daily 5/11/22  Yes Lynsey Gardner MD   esomeprazole ([employer]) 40 MG delayed release capsule Take 1 capsule by mouth daily 5/11/22  Yes Lynsey Gardner MD   atorvastatin (LIPITOR) 40 MG tablet Take 1 tablet by mouth once daily 5/3/22  Yes Americo Condon MD   glucose monitoring (FREESTYLE FREEDOM) kit 1 kit by Does not apply route daily 11/11/21  Yes Lynsey Gardner MD   Blood Glucose Monitoring Suppl (ACCU-CHEK GUIDE ME) w/Device KIT USE TO CHECK GLUCOSE ONCE DAILY 5/4/21  Yes Lynsey Gardner MD   nitroGLYCERIN (NITROSTAT) 0.4 MG SL tablet Place 1 tablet under the tongue every 5 minutes as needed for Chest pain 4/14/21  Yes Deuce Bob MD   blood glucose monitor strips Test 1 time a day 4/14/21  Yes Lynsey Gardner MD   Lancets MISC Lancet device and lancets. Test 2 times an day and prn.  Type II Diabetes uncontrolled not on insulin 6/11/18  Yes Lynsey Gardner MD   aspirin 81 MG tablet Take 81 mg by mouth daily. Yes Historical Provider, MD       Allergies:  Codeine and Morphine    Social History:   reports that she has never smoked. She has never used smokeless tobacco. She reports that she does not drink alcohol and does not use drugs. Family History: family history includes Breast Cancer (age of onset: 62) in her mother; Cataracts in her mother; Diabetes in her father; Heart Attack in her father; Heart Disease in her father; Hypertension in her mother. No h/o sudden cardiac death. No for premature CAD    REVIEW OF SYSTEMS:    Constitutional: there has been no unanticipated weight loss. There's been No change in energy level, No change in activity level. Eyes: No visual changes or diplopia. No scleral icterus. ENT: No Headaches, hearing loss or vertigo. No mouth sores or sore throat. Cardiovascular: see above  Respiratory: see above  Gastrointestinal: No abdominal pain, appetite loss, blood in stools. Genitourinary: No dysuria, trouble voiding, or hematuria. Musculoskeletal:  No gait disturbance, No weakness or joint complaints. Integumentary: No rash or pruritis. Neurological: No headache or diplopia. No tingling  Psychiatric: No anxiety, or depression. Endocrine: No temperature intolerance. Hematologic/Lymphatic: No abnormal bruising or bleeding, blood clots or swollen lymph nodes. Allergic/Immunologic: No nasal congestion or hives. PHYSICAL EXAM:      /63   Pulse 69   Ht 5' 1\" (1.549 m)   Wt 172 lb (78 kg)   LMP  (LMP Unknown)   BMI 32.50 kg/m²    General appearance: alert and cooperative with exam  HEENT: Head: Normocephalic, no lesions, without obvious abnormality.   Eyes: PERRL, EOMI  Ears: Not obvious deformations or lack of hearing  Neck: no carotid bruit, no JVD  Lungs: clear to auscultation bilaterally  Heart: regular rate and rhythm, S1, S2 normal, no murmur, click, rub or gallop  Abdomen: soft, non-tender; bowel sounds normal; no masses,  no organomegaly  Extremities: extremities normal, atraumatic, no cyanosis or edema  Neurologic: Mental status: Alert, oriented, thought content appropriate  Skin: WNL for age and condition, no obvious rashes or leasions      Cardiac Data:  EKG: NSR, NL ECG    Labs:     CBC: No results for input(s): WBC, HGB, HCT, PLT in the last 72 hours. BMP: No results for input(s): NA, K, CO2, BUN, CREATININE, LABGLOM, GLUCOSE in the last 72 hours. PT/INR: No results for input(s): PROTIME, INR in the last 72 hours. FASTING LIPID PANEL:  Lab Results   Component Value Date/Time    HDL 42 05/11/2022 01:36 PM    TRIG 104 05/11/2022 01:36 PM     LIVER PROFILE:No results for input(s): AST, ALT, LABALBU in the last 72 hours. Cardiac cath 8/16/18:   Patent distal LAD and proximal Ramus stents. Otherwise, non-obstructive CAD. Normal LV systolic function. Cardiac cath 9/22/2020   Patent distal LAD stent. Successful PTCA/ZINA of proximal LAD. Unable to pass wire into OM2 due to hairpin loop. Unable to cross RPL lesion, possible local bridging collaterals. Assessment / Acute Cardiac Problems:      1- Rapic heart rate? Currently 69bpm and stable. Gets some squeezing chest pain. Has known CAD that's not amenable to PCI. Will refill SL nitro, she will try that when it happens and let us know if it works. 1-CAD: with multiple PCIs in the past last one 04/2013: Nuclear stress test 02/2016. Cardiac cath 8/16/18 for recurrent chest pain showed patent distal LAD and proximal ramus stents with otherwise non-obstructive CAD. Cardiac cath 9/22/2020 PTCA/ZINA of proximal LAD. Patent distal LAD stent. Distal OM2 lesion, unable to pass wire due to hairpin loop and unable to cross RPL lesion due to possible local bridging collaterals. 2-HTN: well controlled. Stable, chronic  3-HLP: on statin. LDL 65 on 10/10/19. Stable, chronic  4-Preserved LV systolic function. 5-Non-cardiac musculoskeletal chest pain.  Stable, chronic  6-Recurrent palpitations now resolved with event monitor 06/2016 showed NSR with rare short runs of PATs. Stable, chronic  5-OX-gullfrf per primary MD     Plan of Treatment:      1-Continue current medical treatment with ASA, Plavix, BB, ARB, CCB, Nitrates, Ranexa and statin. Obtain lipid panel. 2-Aggressive risk factors modifications. 3-Diet, exercise and loose weight. 4-Patient to discuss need for repeat sleep study. She reports that she had JUMA on sleep study many years ago but insurance wont cover CPAP. 5- RTC 6 months. Thank you for allowing me to participate in the care of this patient, please do not hesitate to call if you have any questions. Hao Paige, [id number] [facility]  [facility]  [phone]

## 2022-08-09 NOTE — PROGRESS NOTES
Subjective:      Patient ID: Agnes Ferguson is a 65 y.o. female. Diabetes  Pertinent negatives for hypoglycemia include no nervousness/anxiousness. Associated symptoms include fatigue. Pertinent negatives for diabetes include no chest pain. Shoulder Pain     Leg Pain     Hypertension  Pertinent negatives include no chest pain, palpitations or shortness of breath. Hyperlipidemia  Associated symptoms include leg pain and myalgias. Pertinent negatives include no chest pain or shortness of breath. Coronary Artery Disease  Symptoms include leg swelling (mild, varies). Pertinent negatives include no chest pain, palpitations or shortness of breath. Risk factors include hyperlipidemia. Gastroesophageal Reflux  She reports no chest pain or no wheezing. Associated symptoms include fatigue. Other  Associated symptoms include arthralgias (right elbow off an on s/p surgery), fatigue and myalgias. Pertinent negatives include no chest pain. Routine follow up on chronic medical conditions, refills, and review of updated labs. I have reviewed the patient's medical history in detail and updated the computerized patient record. Currently living in [facility] with her parents. Step father with liver cancer and passed in April. She is staying with her mother 80 to help her out after the loss of her . Glucometer broke and not checking much recently,. No lows of concern. Using small snack at night. Feels she needs a new meter at present. Still hasnt gotten a new one. She says pharmacy wont give her one. Depression good, no mood concerns. She has some winter time exacerbations and is watching things closely. Did well overall this winter. Overall doing well. Sleeping issues, mostly falling asleep taking an hour to 2 hours. Off caffeine. Has prn trazodone. . this is working well when needed. Compliant with medicaitons. Not compliant with cpap.   She reports insurance wouldn't cover enough for her to afford. Discussed buying her own machine. Stomach ache better. Still has some diarrhea after meals. Past Medical History:   Diagnosis Date    Anxiety     Coronary artery disease     with history of multiple chest pain episodes, MI ruled out, with stents placed in , , and  to the ramus artery, PTCA only to small diagonal.    Depression     with chronic dysthymia, prior intentional overdose on Ambien and Phenergan, history of agoraphobia symptoms. Diabetes mellitus, type 2 ([address]     adult onset. Examination of participant in clinical trial 14    End date 14    Gastroesophageal reflux disease     Hyperlipidemia     Hypertension     Hypomagnesemia     Insomnia     Normal left ventricular systolic function     Obesity     Obstructive sleep apnea     NO MACHINE USED    Sciatic nerve disease      Past Surgical History:   Procedure Laterality Date    BREAST BIOPSY Right 9-15-14    US guided -benign bx, mass in another location    CARDIAC CATHETERIZATION  2018    Patent distal LAD and proximal Ramus stents. CARDIAC CATHETERIZATION  2020     SECTION       SECTION  1985    CHOLECYSTECTOMY      CORONARY ANGIOPLASTY  2009    [facility]    CORONARY ANGIOPLASTY  2013    LAD--stent    CORONARY ANGIOPLASTY WITH STENT PLACEMENT  2011    LAD with diffuse plaque disease, left circumflex with mild irregularities, RCA with diffuse plaque disease, status post drug eluting stent to the proximal ramus. CORONARY ANGIOPLASTY WITH STENT PLACEMENT  2008    LAD with minor proximal disease, distal smooth 50 to 60% stenosis, ramus intermedius with proximal 30 to 40% stenosis followed by a mid segment 90% stenosis, left circumflex was a relatively small vessel with ostial 50% stenosis, RCA was a large dominant vessel with mid segment irregularity and 30% stenosis, status post stent to the ramus intermedius. CORONARY ANGIOPLASTY WITH STENT PLACEMENT  04/11/2013    LAD with minor proximal disease, distal smooth 50 to 60% stenosis, ramus intermedius with proximal 30 to 40% stenosis followed by a mid segment 90% stenosis, left circumflex was a relatively small vessel with ostial 50% stenosis, RCA was a large dominant vessel with mid segment irregularity and 30% stenosis, status post stent to the ramus intermedius. CORONARY ANGIOPLASTY WITH STENT PLACEMENT  09/2009    LAD with minor proximal disease, distal smooth 50 to 60% stenosis, ramus intermedius with proximal 30 to 40% stenosis followed by a mid segment 90% stenosis, left circumflex was a relatively small vessel with ostial 50% stenosis, RCA was a large dominant vessel with mid segment irregularity and 30% stenosis, status post stent to the ramus intermedius. FINGER TRIGGER RELEASE Left 7/13/2015    FRACTURE SURGERY Right 10/1998    ORIF, elbow, supracondylar fracture. HYSTERECTOMY (CERVIX STATUS UNKNOWN)  1986    ovaries spared. MOUTH SURGERY      teeth pulled for dentures    UPPER GASTROINTESTINAL ENDOSCOPY  08/09/2005    erythema and congestion in the antrum compatible with mild gastritis, polyps in the stomach body.       Current Outpatient Medications   Medication Sig Dispense Refill    traZODone (DESYREL) 100 MG tablet Take 1 tablet by mouth nightly 90 tablet 0    metFORMIN (GLUCOPHAGE-XR) 500 MG extended release tablet 1 po  tablet 1    linagliptin (TRADJENTA) 5 MG tablet TAKE 1 TABLET BY MOUTH ONCE DAILY 90 tablet 1    isosorbide mononitrate (IMDUR) 30 MG extended release tablet Take 1 tablet by mouth daily 90 tablet 1    losartan (COZAAR) 100 MG tablet TAKE ONE TABLET BY MOUTH ONCE DAILY 90 tablet 1    metoprolol (LOPRESSOR) 100 MG tablet Take 1 tablet by mouth twice daily 180 tablet 1    hydroCHLOROthiazide (HYDRODIURIL) 50 MG tablet Take 1 tablet by mouth daily 90 tablet 1    ezetimibe (ZETIA) 10 MG tablet Take 1 tablet by mouth daily 90 tablet 1    amLODIPine (NORVASC) 10 MG tablet TAKE 1 TABLET BY MOUTH ONCE DAILY 90 tablet 1    ranolazine (RANEXA) 1000 MG extended release tablet Take 1 tablet by mouth twice daily 180 tablet 1    glimepiride (AMARYL) 4 MG tablet TAKE 1 TABLET BY MOUTH TWICE DAILY WITH MEALS 180 tablet 1    clopidogrel (PLAVIX) 75 MG tablet Take 1 tablet by mouth once daily 90 tablet 1    citalopram (CELEXA) 40 MG tablet Take 1 tablet by mouth once daily 90 tablet 1    buPROPion (WELLBUTRIN SR) 150 MG extended release tablet Take 1 tablet by mouth once daily 90 tablet 1    esomeprazole (NEXIUM) 40 MG delayed release capsule Take 1 capsule by mouth daily 30 capsule 3    atorvastatin (LIPITOR) 40 MG tablet Take 1 tablet by mouth once daily 30 tablet 0    nitroGLYCERIN (NITROSTAT) 0.4 MG SL tablet Place 1 tablet under the tongue every 5 minutes as needed for Chest pain 25 tablet 1    glucose monitoring (FREESTYLE FREEDOM) kit 1 kit by Does not apply route daily 1 kit 0    Blood Glucose Monitoring Suppl (ACCU-CHEK GUIDE ME) w/Device KIT USE TO CHECK GLUCOSE ONCE DAILY 1 kit 0    blood glucose monitor strips Test 1 time a day 200 strip 3    Lancets MISC Lancet device and lancets. Test 2 times an day and prn. Type II Diabetes uncontrolled not on insulin 200 each 3     No current facility-administered medications for this visit. Allergies   Allergen Reactions    Codeine Nausea And Vomiting    Morphine Nausea And Vomiting       Review of Systems   Constitutional: Positive for fatigue. HENT: Negative. Eyes: Negative. Respiratory: Negative for shortness of breath and wheezing. Cardiovascular: Positive for leg swelling (mild, varies). Negative for chest pain and palpitations. Gastrointestinal: Positive for diarrhea (3 months). Endocrine: Negative. Genitourinary: Negative. Musculoskeletal: Positive for arthralgias (right elbow off an on s/p surgery) and myalgias. Skin: Negative. Allergic/Immunologic: Negative. Neurological: Negative. Hematological: Negative. Psychiatric/Behavioral: Positive for sleep disturbance. Negative for dysphoric mood. The patient is not nervous/anxious. Objective:   Physical Exam  Constitutional:       General: She is not in acute distress. Appearance: She is well-developed. HENT:      Head: Normocephalic and atraumatic. Right Ear: External ear normal.      Left Ear: External ear normal.      Mouth/Throat:      Pharynx: No oropharyngeal exudate. Eyes:      General: No scleral icterus. Conjunctiva/sclera: Conjunctivae normal.   Neck:      Thyroid: No thyromegaly. Cardiovascular:      Rate and Rhythm: Normal rate and regular rhythm. Heart sounds: Normal heart sounds. No murmur heard. Pulmonary:      Effort: Pulmonary effort is normal. No respiratory distress. Breath sounds: Normal breath sounds. No wheezing. Abdominal:      General: Bowel sounds are normal. There is no distension. Palpations: Abdomen is soft. Tenderness: There is no abdominal tenderness. There is no rebound. Musculoskeletal:         General: No tenderness. Normal range of motion. Cervical back: Neck supple. Skin:     General: Skin is warm and dry. Findings: No erythema or rash. Neurological:      Mental Status: She is alert and oriented to person, place, and time. Psychiatric:         Behavior: Behavior normal.         Thought Content: Thought content normal.         Judgment: Judgment normal.     /72 (Site: Right Upper Arm, Position: Sitting, Cuff Size: Large Adult)   Pulse 72   Ht 5' 1\" (1.549 m)   Wt 172 lb (78 kg)   LMP  (LMP Unknown)   BMI 32.50 kg/m²    No visits with results within 1 Day(s) from this visit.    Latest known visit with results is:   Hospital Outpatient Visit on 05/11/2022   Component Date Value Ref Range Status    Hemoglobin A1C 05/11/2022 8.8 (A) 4.0 - 6.0 % Final    Estimated Avg Glucose 05/11/2022 206  mg/dL Final Cholesterol 05/11/2022 153  <200 mg/dL Final    HDL 05/11/2022 42  >40 mg/dL Final    LDL Cholesterol 05/11/2022 90  0 - 130 mg/dL Final    Chol/HDL Ratio 05/11/2022 3.6  <5 Final    Triglycerides 05/11/2022 104  <150 mg/dL Final    Glucose 05/11/2022 188 (A) 70 - 99 mg/dL Final    BUN 05/11/2022 16  8 - 23 mg/dL Final    Creatinine 05/11/2022 1.11 (A) 0.50 - 0.90 mg/dL Final    Bun/Cre Ratio 05/11/2022 14  9 - 20 Final    Calcium 05/11/2022 8.9  8.6 - 10.4 mg/dL Final    Sodium 05/11/2022 139  135 - 144 mmol/L Final    Potassium 05/11/2022 4.0  3.7 - 5.3 mmol/L Final    Chloride 05/11/2022 102  98 - 107 mmol/L Final    CO2 05/11/2022 27  20 - 31 mmol/L Final    Anion Gap 05/11/2022 10  9 - 17 mmol/L Final    Alkaline Phosphatase 05/11/2022 95  35 - 104 U/L Final    ALT 05/11/2022 16  5 - 33 U/L Final    AST 05/11/2022 13  <32 U/L Final    Total Bilirubin 05/11/2022 0.47  0.3 - 1.2 mg/dL Final    Total Protein 05/11/2022 6.8  6.4 - 8.3 g/dL Final    Albumin 05/11/2022 3.9  3.5 - 5.2 g/dL Final    Albumin/Globulin Ratio 05/11/2022 1.3  1.0 - 2.5 Final    GFR Non- 05/11/2022 50 (A) >60 mL/min Final    GFR  05/11/2022 >60  >60 mL/min Final    GFR Comment 05/11/2022        Final    WBC 05/11/2022 7.4  3.5 - 11.3 k/uL Final    RBC 05/11/2022 3.81 (A) 3.95 - 5.11 m/uL Final    Hemoglobin 05/11/2022 12.1  11.9 - 15.1 g/dL Final    Hematocrit 05/11/2022 36.3  36.3 - 47.1 % Final    MCV 05/11/2022 95.3  82.6 - 102.9 fL Final    MCH 05/11/2022 31.8  25.2 - 33.5 pg Final    MCHC 05/11/2022 33.3  25.2 - 33.5 g/dL Final    RDW 05/11/2022 12.5  11.8 - 14.4 % Final    Platelets 00/08/9466 272  138 - 453 k/uL Final    MPV 05/11/2022 10.4  8.1 - 13.5 fL Final    NRBC Automated 05/11/2022 0.0  0.0 per 100 WBC Final    Seg Neutrophils 05/11/2022 65  36 - 65 % Final    Lymphocytes 05/11/2022 26  24 - 43 % Final    Monocytes 05/11/2022 6  3 - 12 % Final    Eosinophils % 05/11/2022 2  1 - 4 % Final Basophils 05/11/2022 1  0 - 2 % Final    Immature Granulocytes 05/11/2022 0  0 % Final    Segs Absolute 05/11/2022 4.79  1.50 - 8.10 k/uL Final    Absolute Lymph # 05/11/2022 1.93  1.10 - 3.70 k/uL Final    Absolute Mono # 05/11/2022 0.47  0.10 - 1.20 k/uL Final    Absolute Eos # 05/11/2022 0.15  0.00 - 0.44 k/uL Final    Basophils Absolute 05/11/2022 0.05  0.00 - 0.20 k/uL Final    Absolute Immature Granulocyte 05/11/2022 0.03  0.00 - 0.30 k/uL Final         Assessment:       Encounter Diagnoses   Name Primary? Coronary artery disease involving native coronary artery of native heart with angina pectoris ([facility] [address] Yes    Lump of breast     Essential hypertension     Recurrent major depressive disorder, in full remission ([facility] [address]     Type 2 diabetes mellitus without complication, without long-term current use of insulin ([facility])     Hyperlipidemia, unspecified hyperlipidemia type     Obstructive sleep apnea     Gastroesophageal reflux disease without esophagitis     Hypomagnesemia     Class 2 obesity due to excess calories without serious comorbidity with body mass index (BMI) of 37.0 to 37.9 in adult     Lung nodule     Insomnia, unspecified type              Awaiting updated labs at present. Plan:          CAD: per most recent cardiology follow up: \" CAD: with multiple PCIs in the past last one 04/2013: Nuclear stress test 02/2016. Cardiac cath 8/16/18 for recurrent chest pain showed patent distal LAD and proximal ramus stents with otherwise non-obstructive CAD. Cardiac cath 9/22/2020 PTCA/ZINA of proximal LAD. Patent distal LAD stent. Distal OM2 lesion, unable to pass wire due to hairpin loop and unable to cross RPL lesion due to possible local bridging collaterals. \"  Rare , recurrent chest pain. Less than prior to sept. Stent. She uses ntg prn .no use in the last 6 months. Associated headache. Cont. Ranexa, lopressor, statin, imdur. Rec. Follow up with cardiology.   She relates having issues with cardiology not answering phone?    abnormal mammogram: biopsy right breast mass at 9 o'clock position. Benign fibroadenoma. She had further excisional biopsy due to concerns for correct clip placement/differences in mammogram vs ultrasound locations. Pathology benign/negative for malignancy. Updated Mammogram 3/4/16 with lumpectomy changes without evidence for malignancy. 6 month mammogram 9/26/16: benign. Returning to annual screening. Last couple without concern. Cont. Annually. 8/9/22:  Stable exam.  No mammographic evidence of malignancy       BIRADS:   BIRADS - CATEGORY 2       Benign Findings. Normal interval follow-up is recommended in 12 months. OVERALL ASSESSMENT - BENIGN        Htn:  cont. Current dosing of meds:norvasc, cozaar, lopressor. Depression and anxiety: no depression concerns. Anxiety at baseline at present. Cont. Citalopram at 40mg /day. Using ativan prn.  she reports winter blues. Doing ok at present. Aodm:  She relates compliance with meds: metformin 500xr bid, amaryl 4mg/day, tradjenta 5 mg /day. Not currently checking bs. She is aware of lows. Still has a couple of incidents where she had to take a snack. Adding a snack at bedtime and encouraging more bs checks in the evening and am.    Reminder for eye exam. Sensory exam of the foot is normal, tested with the monofilament. Good pulses, no lesions or ulcers, good peripheral pulses. A1c at 8.8     Hyperlipidemia: on lipitor. Good control at present. Plan to cont. Same. labs pending. Will: non compliant. She reports today that she doesn't have one. Insurance would not cover all of it. She couldn't afford it. Denies daytime somnolence. Consider purchasing own machine     Gerd: mild, intermittent symptoms, improved after getting rid of carbonated beverages. Doing ok with lifestyle mods at present. Recent upper gi symptoms and diarrhea/gas/bloating in the last 3 months. Recurrent heartburn regularly. Adding nexium daily. Hopefully see improvement in both upper and lower symptoms. Hypomagnesemia: she stopped the supplement and levels went low again. She has not restarted yet. Obesity: wt. Fairly stable . She continues to try and loose wt.  rec. Restricted carb. Diet. Lung nodule; Incidental lung nodules found on cxr and CT scan 8/25/14. Unknown significance. No thoracic lymph nodes. No smoking history  CT confirms a 10 mm nodule in the left upper lobe (series 3, image 36)    with an adjacent 4 mm at the cardiac border. Linear densities in the    right middle lobe and both lower lobes compatible with subsegmental    atelectasis versus scarring. No pulmonary infiltrate. No pleural or pericardial effusion. No enlarged thoracic lymph node by CT criteria. Updated CT showed unchanged nodularity 3/10/15; IMPRESSION:    1. Unchanged pulmonary nodularity, largest 11 mm in the left upper lobe. Stable findings in the left hilum and left superior mediastinum, may    represent small lymph nodes though of uncertain etiology or significance. None of these are clearly calcified. PET/CT could be considered for    metabolic assessment and planning for any potential biopsy. Otherwise,    close CT followup would be recommended to ensure 2 years of stability. 2. Small hypodense thyroid nodules, which could be further    evaluated/followed up with ultrasound. CT 7/30/18:     Impression    Stable CT with re- demonstration of few lung nodules largest again measuring    1.1 cm in the lingula with subtle calcification suggesting calcified    granuloma. No acute process with few incidental/chronic findings as    described. Chronic insomnia: good initial response to trazodone. Starting to see decreased efficacy, likely due to tolerance. Increased to 100mg hs. Variable efficacy at present. Works mostly, some nights doesn't seem to work. Continues prn use.   .       Discussed colonoscopy screening. Mammogram today. Willing to schedule c/s with dr. Petey Espinosa, she has not followed through. Scheduled twice without follow through. Encouraged completion.

## 2022-08-09 NOTE — TELEPHONE ENCOUNTER
Patient received 1 dose of Nitro due to chest pain via Lima Victor LPN. Patient then received COVID booster. When patient was leaving, she reported dizziness. Patient helped back to room by staff Harish Loya RN and Ambar Lux LPN   Blood pressure of 132/82. Patient declines any further chest pain.

## 2022-08-09 NOTE — TELEPHONE ENCOUNTER
Dr Zechariah Deleon colonoscopy paperwork sent. No issues per family practice. Patient request Dr Geovani Wheeler.

## 2022-09-06 RX ORDER — LOSARTAN POTASSIUM 100 MG/1
TABLET ORAL
Qty: 90 TABLET | Refills: 0 | Status: SHIPPED | OUTPATIENT
Start: 2022-09-06

## 2022-10-06 ENCOUNTER — IMMUNIZATION (OUTPATIENT)
Dept: LAB | Age: 62
End: 2022-10-06
Payer: MEDICARE

## 2022-10-06 PROCEDURE — PBSHW INFLUENZA, FLUARIX, (AGE 6 MO+),  IM, PF, 0.5 ML: Performed by: FAMILY MEDICINE

## 2022-10-06 PROCEDURE — 90686 IIV4 VACC NO PRSV 0.5 ML IM: CPT | Performed by: FAMILY MEDICINE

## 2022-10-24 DIAGNOSIS — E78.5 HYPERLIPIDEMIA, UNSPECIFIED HYPERLIPIDEMIA TYPE: ICD-10-CM

## 2022-10-24 DIAGNOSIS — I25.119 CORONARY ARTERY DISEASE INVOLVING NATIVE CORONARY ARTERY OF NATIVE HEART WITH ANGINA PECTORIS (HCC): Primary | ICD-10-CM

## 2022-10-24 RX ORDER — ATORVASTATIN CALCIUM 40 MG/1
TABLET, FILM COATED ORAL
Qty: 30 TABLET | Refills: 0 | Status: SHIPPED | OUTPATIENT
Start: 2022-10-24

## 2022-10-24 NOTE — TELEPHONE ENCOUNTER
Silas Apley called requesting a refill of the below medication which has been pended for you:     Requested Prescriptions     Pending Prescriptions Disp Refills    atorvastatin (LIPITOR) 40 MG tablet [Pharmacy Med Name: Atorvastatin Calcium 40 MG Oral Tablet] 30 tablet 0     Sig: Take 1 tablet by mouth once daily       Last Appointment Date: 08/09/2022  Next Appointment Date: 11/14/2022    Allergies   Allergen Reactions    Codeine Nausea And Vomiting    Morphine Nausea And Vomiting

## 2022-12-22 DIAGNOSIS — I25.119 CORONARY ARTERY DISEASE INVOLVING NATIVE CORONARY ARTERY OF NATIVE HEART WITH ANGINA PECTORIS (HCC): ICD-10-CM

## 2022-12-22 RX ORDER — ATORVASTATIN CALCIUM 40 MG/1
TABLET, FILM COATED ORAL
Qty: 90 TABLET | Refills: 0 | Status: SHIPPED | OUTPATIENT
Start: 2022-12-22

## 2022-12-22 RX ORDER — LOSARTAN POTASSIUM 100 MG/1
TABLET ORAL
Qty: 90 TABLET | Refills: 0 | Status: SHIPPED | OUTPATIENT
Start: 2022-12-22

## 2023-01-13 NOTE — TELEPHONE ENCOUNTER
Sunita called requesting a refill of the below medication which has been pended for you:     Requested Prescriptions     Pending Prescriptions Disp Refills    linagliptin (TRADJENTA) 5 MG tablet [Pharmacy Med Name: Tradjenta 5 MG Oral Tablet] 30 tablet 0     Sig: TAKE 1 TABLET BY MOUTH ONCE DAILY       Last Appointment Date: 8/9/2022  Next Appointment Date: 2/13/2023    Allergies   Allergen Reactions    Codeine Nausea And Vomiting    Morphine Nausea And Vomiting

## 2023-02-14 RX ORDER — BUPROPION HYDROCHLORIDE 150 MG/1
TABLET, EXTENDED RELEASE ORAL
Qty: 90 TABLET | Refills: 0 | Status: SHIPPED | OUTPATIENT
Start: 2023-02-14 | End: 2023-02-16 | Stop reason: SDUPTHER

## 2023-02-16 ENCOUNTER — OFFICE VISIT (OUTPATIENT)
Dept: FAMILY MEDICINE CLINIC | Age: 63
End: 2023-02-16
Payer: MEDICARE

## 2023-02-16 VITALS
WEIGHT: 179 LBS | HEART RATE: 68 BPM | BODY MASS INDEX: 33.79 KG/M2 | HEIGHT: 61 IN | SYSTOLIC BLOOD PRESSURE: 128 MMHG | DIASTOLIC BLOOD PRESSURE: 72 MMHG

## 2023-02-16 DIAGNOSIS — E66.09 CLASS 2 OBESITY DUE TO EXCESS CALORIES WITHOUT SERIOUS COMORBIDITY WITH BODY MASS INDEX (BMI) OF 37.0 TO 37.9 IN ADULT: ICD-10-CM

## 2023-02-16 DIAGNOSIS — E83.42 HYPOMAGNESEMIA: ICD-10-CM

## 2023-02-16 DIAGNOSIS — I10 ESSENTIAL HYPERTENSION: ICD-10-CM

## 2023-02-16 DIAGNOSIS — E11.9 TYPE 2 DIABETES MELLITUS WITHOUT COMPLICATION, WITHOUT LONG-TERM CURRENT USE OF INSULIN (HCC): ICD-10-CM

## 2023-02-16 DIAGNOSIS — Z12.11 COLON CANCER SCREENING: ICD-10-CM

## 2023-02-16 DIAGNOSIS — R91.1 LUNG NODULE: ICD-10-CM

## 2023-02-16 DIAGNOSIS — N63.14 MASS OF LOWER INNER QUADRANT OF RIGHT BREAST: Primary | ICD-10-CM

## 2023-02-16 DIAGNOSIS — E78.5 HYPERLIPIDEMIA, UNSPECIFIED HYPERLIPIDEMIA TYPE: ICD-10-CM

## 2023-02-16 DIAGNOSIS — E11.3299 BACKGROUND DIABETIC RETINOPATHY (HCC): ICD-10-CM

## 2023-02-16 DIAGNOSIS — K21.9 GASTROESOPHAGEAL REFLUX DISEASE WITHOUT ESOPHAGITIS: ICD-10-CM

## 2023-02-16 DIAGNOSIS — G47.33 OBSTRUCTIVE SLEEP APNEA: ICD-10-CM

## 2023-02-16 DIAGNOSIS — I25.119 CORONARY ARTERY DISEASE INVOLVING NATIVE CORONARY ARTERY OF NATIVE HEART WITH ANGINA PECTORIS (HCC): ICD-10-CM

## 2023-02-16 DIAGNOSIS — G47.00 INSOMNIA, UNSPECIFIED TYPE: ICD-10-CM

## 2023-02-16 PROCEDURE — 3078F DIAST BP <80 MM HG: CPT | Performed by: FAMILY MEDICINE

## 2023-02-16 PROCEDURE — 3074F SYST BP LT 130 MM HG: CPT | Performed by: FAMILY MEDICINE

## 2023-02-16 PROCEDURE — 3017F COLORECTAL CA SCREEN DOC REV: CPT | Performed by: FAMILY MEDICINE

## 2023-02-16 PROCEDURE — 1036F TOBACCO NON-USER: CPT | Performed by: FAMILY MEDICINE

## 2023-02-16 PROCEDURE — G8482 FLU IMMUNIZE ORDER/ADMIN: HCPCS | Performed by: FAMILY MEDICINE

## 2023-02-16 PROCEDURE — 3046F HEMOGLOBIN A1C LEVEL >9.0%: CPT | Performed by: FAMILY MEDICINE

## 2023-02-16 PROCEDURE — 99213 OFFICE O/P EST LOW 20 MIN: CPT | Performed by: FAMILY MEDICINE

## 2023-02-16 PROCEDURE — G8427 DOCREV CUR MEDS BY ELIG CLIN: HCPCS | Performed by: FAMILY MEDICINE

## 2023-02-16 PROCEDURE — 99214 OFFICE O/P EST MOD 30 MIN: CPT | Performed by: FAMILY MEDICINE

## 2023-02-16 PROCEDURE — 2022F DILAT RTA XM EVC RTNOPTHY: CPT | Performed by: FAMILY MEDICINE

## 2023-02-16 PROCEDURE — G8417 CALC BMI ABV UP PARAM F/U: HCPCS | Performed by: FAMILY MEDICINE

## 2023-02-16 RX ORDER — CLOPIDOGREL BISULFATE 75 MG/1
TABLET ORAL
Qty: 90 TABLET | Refills: 1 | Status: SHIPPED | OUTPATIENT
Start: 2023-02-16

## 2023-02-16 RX ORDER — HYDROCHLOROTHIAZIDE 50 MG/1
50 TABLET ORAL DAILY
Qty: 90 TABLET | Refills: 1 | Status: SHIPPED | OUTPATIENT
Start: 2023-02-16

## 2023-02-16 RX ORDER — BUPROPION HYDROCHLORIDE 150 MG/1
TABLET, EXTENDED RELEASE ORAL
Qty: 90 TABLET | Refills: 1 | Status: SHIPPED | OUTPATIENT
Start: 2023-02-16

## 2023-02-16 RX ORDER — ESOMEPRAZOLE MAGNESIUM 40 MG/1
40 CAPSULE, DELAYED RELEASE ORAL DAILY
Qty: 90 CAPSULE | Refills: 1 | Status: SHIPPED | OUTPATIENT
Start: 2023-02-16

## 2023-02-16 RX ORDER — CITALOPRAM 40 MG/1
TABLET ORAL
Qty: 90 TABLET | Refills: 1 | Status: SHIPPED | OUTPATIENT
Start: 2023-02-16

## 2023-02-16 RX ORDER — EZETIMIBE 10 MG/1
10 TABLET ORAL DAILY
Qty: 90 TABLET | Refills: 1 | Status: SHIPPED | OUTPATIENT
Start: 2023-02-16

## 2023-02-16 RX ORDER — GLIMEPIRIDE 4 MG/1
TABLET ORAL
Qty: 180 TABLET | Refills: 1 | Status: SHIPPED | OUTPATIENT
Start: 2023-02-16

## 2023-02-16 RX ORDER — METOPROLOL TARTRATE 100 MG/1
TABLET ORAL
Qty: 180 TABLET | Refills: 1 | Status: SHIPPED | OUTPATIENT
Start: 2023-02-16

## 2023-02-16 RX ORDER — ISOSORBIDE MONONITRATE 30 MG/1
30 TABLET, EXTENDED RELEASE ORAL DAILY
Qty: 90 TABLET | Refills: 1 | Status: SHIPPED | OUTPATIENT
Start: 2023-02-16

## 2023-02-16 RX ORDER — ATORVASTATIN CALCIUM 40 MG/1
TABLET, FILM COATED ORAL
Qty: 90 TABLET | Refills: 1 | Status: SHIPPED | OUTPATIENT
Start: 2023-02-16

## 2023-02-16 RX ORDER — LOSARTAN POTASSIUM 100 MG/1
TABLET ORAL
Qty: 90 TABLET | Refills: 1 | Status: SHIPPED | OUTPATIENT
Start: 2023-02-16

## 2023-02-16 RX ORDER — METFORMIN HYDROCHLORIDE 500 MG/1
TABLET, EXTENDED RELEASE ORAL
Qty: 180 TABLET | Refills: 1 | Status: SHIPPED | OUTPATIENT
Start: 2023-02-16

## 2023-02-16 RX ORDER — TRAZODONE HYDROCHLORIDE 100 MG/1
TABLET ORAL
Qty: 90 TABLET | Refills: 1 | Status: SHIPPED | OUTPATIENT
Start: 2023-02-16

## 2023-02-16 RX ORDER — AMLODIPINE BESYLATE 10 MG/1
TABLET ORAL
Qty: 90 TABLET | Refills: 1 | Status: SHIPPED | OUTPATIENT
Start: 2023-02-16

## 2023-02-16 SDOH — ECONOMIC STABILITY: INCOME INSECURITY: HOW HARD IS IT FOR YOU TO PAY FOR THE VERY BASICS LIKE FOOD, HOUSING, MEDICAL CARE, AND HEATING?: NOT HARD AT ALL

## 2023-02-16 SDOH — ECONOMIC STABILITY: FOOD INSECURITY: WITHIN THE PAST 12 MONTHS, YOU WORRIED THAT YOUR FOOD WOULD RUN OUT BEFORE YOU GOT MONEY TO BUY MORE.: NEVER TRUE

## 2023-02-16 SDOH — ECONOMIC STABILITY: FOOD INSECURITY: WITHIN THE PAST 12 MONTHS, THE FOOD YOU BOUGHT JUST DIDN'T LAST AND YOU DIDN'T HAVE MONEY TO GET MORE.: NEVER TRUE

## 2023-02-16 SDOH — ECONOMIC STABILITY: HOUSING INSECURITY
IN THE LAST 12 MONTHS, WAS THERE A TIME WHEN YOU DID NOT HAVE A STEADY PLACE TO SLEEP OR SLEPT IN A SHELTER (INCLUDING NOW)?: NO

## 2023-02-16 ASSESSMENT — PATIENT HEALTH QUESTIONNAIRE - PHQ9
2. FEELING DOWN, DEPRESSED OR HOPELESS: 0
9. THOUGHTS THAT YOU WOULD BE BETTER OFF DEAD, OR OF HURTING YOURSELF: 0
7. TROUBLE CONCENTRATING ON THINGS, SUCH AS READING THE NEWSPAPER OR WATCHING TELEVISION: 0
5. POOR APPETITE OR OVEREATING: 0
SUM OF ALL RESPONSES TO PHQ QUESTIONS 1-9: 0
3. TROUBLE FALLING OR STAYING ASLEEP: 0
4. FEELING TIRED OR HAVING LITTLE ENERGY: 0
1. LITTLE INTEREST OR PLEASURE IN DOING THINGS: 0
SUM OF ALL RESPONSES TO PHQ9 QUESTIONS 1 & 2: 0
8. MOVING OR SPEAKING SO SLOWLY THAT OTHER PEOPLE COULD HAVE NOTICED. OR THE OPPOSITE, BEING SO FIGETY OR RESTLESS THAT YOU HAVE BEEN MOVING AROUND A LOT MORE THAN USUAL: 0
SUM OF ALL RESPONSES TO PHQ QUESTIONS 1-9: 0
6. FEELING BAD ABOUT YOURSELF - OR THAT YOU ARE A FAILURE OR HAVE LET YOURSELF OR YOUR FAMILY DOWN: 0
SUM OF ALL RESPONSES TO PHQ QUESTIONS 1-9: 0
SUM OF ALL RESPONSES TO PHQ QUESTIONS 1-9: 0
10. IF YOU CHECKED OFF ANY PROBLEMS, HOW DIFFICULT HAVE THESE PROBLEMS MADE IT FOR YOU TO DO YOUR WORK, TAKE CARE OF THINGS AT HOME, OR GET ALONG WITH OTHER PEOPLE: 0

## 2023-02-16 ASSESSMENT — ENCOUNTER SYMPTOMS
SHORTNESS OF BREATH: 0
ALLERGIC/IMMUNOLOGIC NEGATIVE: 1
DIARRHEA: 1
EYES NEGATIVE: 1
WHEEZING: 0

## 2023-02-16 NOTE — PROGRESS NOTES
Subjective:      Patient ID: Sunday Cao is a 59 y.o. female. Diabetes  Pertinent negatives for hypoglycemia include no nervousness/anxiousness. Associated symptoms include fatigue. Pertinent negatives for diabetes include no chest pain. Other  Associated symptoms include arthralgias (right elbow off an on s/p surgery), fatigue and myalgias. Pertinent negatives include no chest pain. Leg Pain     Shoulder Pain     Hypertension  Pertinent negatives include no chest pain, palpitations or shortness of breath. Hyperlipidemia  Associated symptoms include leg pain and myalgias. Pertinent negatives include no chest pain or shortness of breath. Coronary Artery Disease  Symptoms include leg swelling (mild, varies). Pertinent negatives include no chest pain, palpitations or shortness of breath. Risk factors include hyperlipidemia. Gastroesophageal Reflux  She reports no chest pain or no wheezing. Associated symptoms include fatigue. Diarrhea   Associated symptoms include arthralgias (right elbow off an on s/p surgery) and myalgias. Routine follow up on chronic medical conditions, refills, and review of updated labs. I have reviewed the patient's medical history in detail and updated the computerized patient record. Currently living in [facility] with her parents. Step father with liver cancer and passed in April. She is staying with her mother 80 to help her out after the loss of her . Glucometer broke and not checking much, has new meter now. No lows of concern. Using small snack at night. Feels she needs a new meter at present. Still hasnt gotten a new one. She says pharmacy wont give her one. Depression good, no mood concerns. She has some winter time exacerbations and is watching things closely. Did well overall this winter. Overall doing well. Sleeping issues, mostly falling asleep taking an hour to 2 hours. Off caffeine. Has prn trazodone. . this is working well when needed. Compliant with medicaitons. Not compliant with cpap. She reports insurance wouldn't cover enough for her to afford. Discussed buying her own machine. Stomach ache better. Still has some diarrhea after meals. Mild crampy sensation. Soft stools the norm. Some foods related. Metformin likely contributing. Past Medical History:   Diagnosis Date    Anxiety     Coronary artery disease     with history of multiple chest pain episodes, MI ruled out, with stents placed in , , and  to the ramus artery, PTCA only to small diagonal.    Depression     with chronic dysthymia, prior intentional overdose on Ambien and Phenergan, history of agoraphobia symptoms. Diabetes mellitus, type 2 ([address]     adult onset. Examination of participant in clinical trial 14    End date 14    Gastroesophageal reflux disease     Hyperlipidemia     Hypertension     Hypomagnesemia     Insomnia     Normal left ventricular systolic function     Obesity     Obstructive sleep apnea     NO MACHINE USED    Sciatic nerve disease      Past Surgical History:   Procedure Laterality Date    BREAST BIOPSY Right 9-15-14    US guided -benign bx, mass in another location    CARDIAC CATHETERIZATION  2018    Patent distal LAD and proximal Ramus stents. CARDIAC CATHETERIZATION  2020     SECTION       SECTION  1985    CHOLECYSTECTOMY      CORONARY ANGIOPLASTY  2009    [facility]    CORONARY ANGIOPLASTY  2013    LAD--stent    CORONARY ANGIOPLASTY WITH STENT PLACEMENT  2011    LAD with diffuse plaque disease, left circumflex with mild irregularities, RCA with diffuse plaque disease, status post drug eluting stent to the proximal ramus.      CORONARY ANGIOPLASTY WITH STENT PLACEMENT  2008    LAD with minor proximal disease, distal smooth 50 to 60% stenosis, ramus intermedius with proximal 30 to 40% stenosis followed by a mid segment 90% stenosis, left circumflex was a relatively small vessel with ostial 50% stenosis, RCA was a large dominant vessel with mid segment irregularity and 30% stenosis, status post stent to the ramus intermedius. CORONARY ANGIOPLASTY WITH STENT PLACEMENT  04/11/2013    LAD with minor proximal disease, distal smooth 50 to 60% stenosis, ramus intermedius with proximal 30 to 40% stenosis followed by a mid segment 90% stenosis, left circumflex was a relatively small vessel with ostial 50% stenosis, RCA was a large dominant vessel with mid segment irregularity and 30% stenosis, status post stent to the ramus intermedius. CORONARY ANGIOPLASTY WITH STENT PLACEMENT  09/2009    LAD with minor proximal disease, distal smooth 50 to 60% stenosis, ramus intermedius with proximal 30 to 40% stenosis followed by a mid segment 90% stenosis, left circumflex was a relatively small vessel with ostial 50% stenosis, RCA was a large dominant vessel with mid segment irregularity and 30% stenosis, status post stent to the ramus intermedius. FINGER TRIGGER RELEASE Left 7/13/2015    FRACTURE SURGERY Right 10/1998    ORIF, elbow, supracondylar fracture. HYSTERECTOMY (CERVIX STATUS UNKNOWN)  1986    ovaries spared. MOUTH SURGERY      teeth pulled for dentures    UPPER GASTROINTESTINAL ENDOSCOPY  08/09/2005    erythema and congestion in the antrum compatible with mild gastritis, polyps in the stomach body.       Current Outpatient Medications   Medication Sig Dispense Refill    buPROPion (WELLBUTRIN SR) 150 MG extended release tablet Take 1 tablet by mouth once daily 90 tablet 0    atorvastatin (LIPITOR) 40 MG tablet Take 1 tablet by mouth once daily 90 tablet 0    losartan (COZAAR) 100 MG tablet Take 1 tablet by mouth once daily 90 tablet 0    nitroGLYCERIN (NITROSTAT) 0.4 MG SL tablet Place 1 tablet under the tongue every 5 minutes as needed for Chest pain 25 tablet 1    traZODone (DESYREL) 100 MG tablet Take 1 tablet by mouth nightly 90 tablet 0    metFORMIN (GLUCOPHAGE-XR) 500 MG extended release tablet 1 po  tablet 1    isosorbide mononitrate (IMDUR) 30 MG extended release tablet Take 1 tablet by mouth daily 90 tablet 1    metoprolol (LOPRESSOR) 100 MG tablet Take 1 tablet by mouth twice daily 180 tablet 1    hydroCHLOROthiazide (HYDRODIURIL) 50 MG tablet Take 1 tablet by mouth daily 90 tablet 1    ezetimibe (ZETIA) 10 MG tablet Take 1 tablet by mouth daily 90 tablet 1    amLODIPine (NORVASC) 10 MG tablet TAKE 1 TABLET BY MOUTH ONCE DAILY 90 tablet 1    glimepiride (AMARYL) 4 MG tablet TAKE 1 TABLET BY MOUTH TWICE DAILY WITH MEALS 180 tablet 1    clopidogrel (PLAVIX) 75 MG tablet Take 1 tablet by mouth once daily 90 tablet 1    citalopram (CELEXA) 40 MG tablet Take 1 tablet by mouth once daily 90 tablet 1    esomeprazole (NEXIUM) 40 MG delayed release capsule Take 1 capsule by mouth daily 30 capsule 3    linagliptin (TRADJENTA) 5 MG tablet TAKE 1 TABLET BY MOUTH ONCE DAILY 30 tablet 0    glucose monitoring (FREESTYLE FREEDOM) kit 1 kit by Does not apply route daily 1 kit 0    Blood Glucose Monitoring Suppl (ACCU-CHEK GUIDE ME) w/Device KIT USE TO CHECK GLUCOSE ONCE DAILY 1 kit 0    blood glucose monitor strips Test 1 time a day 200 strip 3    Lancets MISC Lancet device and lancets. Test 2 times an day and prn. Type II Diabetes uncontrolled not on insulin 200 each 3     No current facility-administered medications for this visit. Allergies   Allergen Reactions    Codeine Nausea And Vomiting    Morphine Nausea And Vomiting       Review of Systems   Constitutional:  Positive for fatigue. HENT: Negative. Eyes: Negative. Respiratory:  Negative for shortness of breath and wheezing. Cardiovascular:  Positive for leg swelling (mild, varies). Negative for chest pain and palpitations. Gastrointestinal:  Positive for diarrhea. Endocrine: Negative. Genitourinary: Negative.     Musculoskeletal:  Positive for arthralgias (right elbow off an on s/p surgery) and myalgias. Skin: Negative. Allergic/Immunologic: Negative. Neurological: Negative. Hematological: Negative. Psychiatric/Behavioral:  Positive for sleep disturbance. Negative for dysphoric mood. The patient is not nervous/anxious. Objective:   Physical Exam  Constitutional:       General: She is not in acute distress. Appearance: She is well-developed. She is obese. HENT:      Head: Normocephalic and atraumatic. Right Ear: External ear normal.      Left Ear: External ear normal.      Mouth/Throat:      Pharynx: No oropharyngeal exudate. Eyes:      General: No scleral icterus. Conjunctiva/sclera: Conjunctivae normal.   Neck:      Thyroid: No thyromegaly. Cardiovascular:      Rate and Rhythm: Normal rate and regular rhythm. Heart sounds: Normal heart sounds. No murmur heard. Pulmonary:      Effort: Pulmonary effort is normal. No respiratory distress. Breath sounds: Normal breath sounds. No wheezing. Abdominal:      General: Bowel sounds are normal. There is no distension. Palpations: Abdomen is soft. Tenderness: There is no abdominal tenderness. There is no rebound. Musculoskeletal:         General: No tenderness. Normal range of motion. Cervical back: Neck supple. Skin:     General: Skin is warm and dry. Findings: No erythema or rash. Neurological:      Mental Status: She is alert and oriented to person, place, and time. Psychiatric:         Behavior: Behavior normal.         Thought Content: Thought content normal.         Judgment: Judgment normal.   /72 (Site: Right Upper Arm, Position: Sitting, Cuff Size: Large Adult)   Pulse 68   Ht 5' 1\" (1.549 m)   Wt 179 lb (81.2 kg)   LMP  (LMP Unknown)   BMI 33.82 kg/m²          Assessment:       Encounter Diagnoses   Name Primary?     Coronary artery disease involving native coronary artery of native heart with angina pectoris [facility])     Mass of lower inner quadrant of right breast Yes    Essential hypertension     Hyperlipidemia, unspecified hyperlipidemia type     Obstructive sleep apnea     Gastroesophageal reflux disease without esophagitis     Hypomagnesemia     Class 2 obesity due to excess calories without serious comorbidity with body mass index (BMI) of 37.0 to 37.9 in adult     Lung nodule     Insomnia, unspecified type     Colon cancer screening     Type 2 diabetes mellitus without complication, without long-term current use of insulin (HCC)     Background diabetic retinopathy ([facility] [address]              Awaiting updated labs at present. Plan:          CAD: per most recent cardiology follow up: \" CAD: with multiple PCIs in the past last one 04/2013: Nuclear stress test 02/2016. Cardiac cath 8/16/18 for recurrent chest pain showed patent distal LAD and proximal ramus stents with otherwise non-obstructive CAD. Cardiac cath 9/22/2020 PTCA/ZINA of proximal LAD. Patent distal LAD stent. Distal OM2 lesion, unable to pass wire due to hairpin loop and unable to cross RPL lesion due to possible local bridging collaterals. \"  Rare , recurrent chest pain. Less than prior to sept. Stent. She uses ntg prn .no use in the last 6 months. Associated headache. Cont. Ranexa, lopressor, statin, imdur. Rec. Follow up with cardiology. She relates having issues with cardiology not answering phone?    abnormal mammogram: biopsy right breast mass at 9 o'clock position. Benign fibroadenoma. She had further excisional biopsy due to concerns for correct clip placement/differences in mammogram vs ultrasound locations. Pathology benign/negative for malignancy. Updated Mammogram 3/4/16 with lumpectomy changes without evidence for malignancy. 6 month mammogram 9/26/16: benign. Returning to annual screening. Last couple without concern. Cont. Annually. 8/9/22:  Stable exam.  No mammographic evidence of malignancy       BIRADS:   BIRADS - CATEGORY 2       Benign Findings. Normal interval follow-up is recommended in 12 months. OVERALL ASSESSMENT - BENIGN        Htn:  cont. Current dosing of meds:norvasc, cozaar, lopressor. Depression and anxiety: no depression concerns. Anxiety at baseline at present. Cont. Citalopram at 40mg /day. Using ativan prn.  she reports winter blues. Doing ok at present. Aodm:  She relates compliance with meds: metformin 500xr bid, amaryl 4mg/day, tradjenta 5 mg /day. Not currently checking bs. She is aware of lows. Still has a couple of incidents where she had to take a snack. Adding a snack at bedtime and encouraging more bs checks in the evening and am.    Reminder for eye exam. Sensory exam of the foot is normal, tested with the monofilament. Good pulses, no lesions or ulcers, good peripheral pulses. A1c at 8.8.  updated lab pending draw. Some known retinopathy, reminder for regular ophthalmology follow up. Hyperlipidemia: on lipitor. Good control at present. Plan to cont. Same. labs pending. Will: non compliant. She reports today that she doesn't have one. Insurance would not cover all of it. She couldn't afford it. Denies daytime somnolence. Consider purchasing own machine     Gerd: mild, intermittent symptoms, improved after getting rid of carbonated beverages. Doing ok with lifestyle mods at present. Recent upper gi symptoms and diarrhea/gas/bloating in the last 3 months. Recurrent heartburn regularly. Adding nexium daily. Hopefully see improvement in both upper and lower symptoms. Hypomagnesemia: she stopped the supplement and levels went low again. She has not restarted yet. Obesity: wt. Fairly stable . She continues to try and loose wt.  rec. Restricted carb. Diet. Lung nodule; Incidental lung nodules found on cxr and CT scan 8/25/14. Unknown significance. No thoracic lymph nodes.  No smoking history  CT confirms a 10 mm nodule in the left upper lobe (series 3, image 36)    with an adjacent 4 mm at the cardiac border. Linear densities in the    right middle lobe and both lower lobes compatible with subsegmental    atelectasis versus scarring. No pulmonary infiltrate. No pleural or pericardial effusion. No enlarged thoracic lymph node by CT criteria. Updated CT showed unchanged nodularity 3/10/15; IMPRESSION:    1. Unchanged pulmonary nodularity, largest 11 mm in the left upper lobe. Stable findings in the left hilum and left superior mediastinum, may    represent small lymph nodes though of uncertain etiology or significance. None of these are clearly calcified. PET/CT could be considered for    metabolic assessment and planning for any potential biopsy. Otherwise,    close CT followup would be recommended to ensure 2 years of stability. 2. Small hypodense thyroid nodules, which could be further    evaluated/followed up with ultrasound. CT 7/30/18:     Impression    Stable CT with re- demonstration of few lung nodules largest again measuring    1.1 cm in the lingula with subtle calcification suggesting calcified    granuloma. No acute process with few incidental/chronic findings as    described. Chronic insomnia: good initial response to trazodone. Starting to see decreased efficacy, likely due to tolerance. Increased to 100mg hs. Variable efficacy at present. Works mostly, some nights doesn't seem to work. Continues prn use. .       Discussed colonoscopy screening. Mammogram today. Willing to schedule c/s with dr. Kvng Malone, she has not followed through. Scheduled twice without follow through. Encouraged completion.

## 2023-04-19 RX ORDER — TRAZODONE HYDROCHLORIDE 100 MG/1
TABLET ORAL
Qty: 90 TABLET | Refills: 0 | Status: SHIPPED | OUTPATIENT
Start: 2023-04-19

## 2023-07-17 ENCOUNTER — TELEPHONE (OUTPATIENT)
Dept: FAMILY MEDICINE CLINIC | Age: 63
End: 2023-07-17

## 2023-07-17 DIAGNOSIS — Z12.31 VISIT FOR SCREENING MAMMOGRAM: Primary | ICD-10-CM

## 2023-08-16 ENCOUNTER — OFFICE VISIT (OUTPATIENT)
Dept: FAMILY MEDICINE CLINIC | Age: 63
End: 2023-08-16
Payer: MEDICARE

## 2023-08-16 ENCOUNTER — HOSPITAL ENCOUNTER (OUTPATIENT)
Age: 63
Discharge: HOME | End: 2023-08-16
Payer: MEDICARE

## 2023-08-16 ENCOUNTER — HOSPITAL ENCOUNTER (OUTPATIENT)
Dept: MAMMOGRAPHY | Age: 63
Discharge: HOME | End: 2023-08-18
Payer: MEDICARE

## 2023-08-16 VITALS
DIASTOLIC BLOOD PRESSURE: 70 MMHG | HEART RATE: 74 BPM | OXYGEN SATURATION: 97 % | HEIGHT: 61 IN | WEIGHT: 179 LBS | SYSTOLIC BLOOD PRESSURE: 122 MMHG | BODY MASS INDEX: 33.79 KG/M2

## 2023-08-16 VITALS — HEIGHT: 61 IN | WEIGHT: 179 LBS | BODY MASS INDEX: 33.79 KG/M2

## 2023-08-16 DIAGNOSIS — Z12.31 VISIT FOR SCREENING MAMMOGRAM: ICD-10-CM

## 2023-08-16 DIAGNOSIS — I10 ESSENTIAL HYPERTENSION: ICD-10-CM

## 2023-08-16 DIAGNOSIS — E11.69 DIABETES MELLITUS TYPE 2 IN OBESE (HCC): ICD-10-CM

## 2023-08-16 DIAGNOSIS — I25.119 CORONARY ARTERY DISEASE INVOLVING NATIVE CORONARY ARTERY OF NATIVE HEART WITH ANGINA PECTORIS (HCC): Primary | ICD-10-CM

## 2023-08-16 DIAGNOSIS — E11.9 TYPE 2 DIABETES MELLITUS WITHOUT COMPLICATION, WITHOUT LONG-TERM CURRENT USE OF INSULIN (HCC): ICD-10-CM

## 2023-08-16 DIAGNOSIS — E78.5 HYPERLIPIDEMIA, UNSPECIFIED HYPERLIPIDEMIA TYPE: ICD-10-CM

## 2023-08-16 DIAGNOSIS — Z12.11 COLON CANCER SCREENING: ICD-10-CM

## 2023-08-16 DIAGNOSIS — G47.00 INSOMNIA, UNSPECIFIED TYPE: ICD-10-CM

## 2023-08-16 DIAGNOSIS — K21.9 GASTROESOPHAGEAL REFLUX DISEASE WITHOUT ESOPHAGITIS: ICD-10-CM

## 2023-08-16 DIAGNOSIS — E66.9 DIABETES MELLITUS TYPE 2 IN OBESE (HCC): ICD-10-CM

## 2023-08-16 DIAGNOSIS — E66.09 CLASS 2 OBESITY DUE TO EXCESS CALORIES WITHOUT SERIOUS COMORBIDITY WITH BODY MASS INDEX (BMI) OF 37.0 TO 37.9 IN ADULT: ICD-10-CM

## 2023-08-16 DIAGNOSIS — D24.2 BENIGN NEOPLASM OF LEFT BREAST: ICD-10-CM

## 2023-08-16 DIAGNOSIS — N63.20 MASS OF LEFT BREAST, UNSPECIFIED QUADRANT: Primary | ICD-10-CM

## 2023-08-16 DIAGNOSIS — R91.1 LUNG NODULE: ICD-10-CM

## 2023-08-16 DIAGNOSIS — E83.42 HYPOMAGNESEMIA: ICD-10-CM

## 2023-08-16 DIAGNOSIS — G47.33 OBSTRUCTIVE SLEEP APNEA: ICD-10-CM

## 2023-08-16 LAB
ALBUMIN SERPL-MCNC: 4 G/DL (ref 3.5–5.2)
ALBUMIN/GLOB SERPL: 1.5 {RATIO} (ref 1–2.5)
ALP SERPL-CCNC: 95 U/L (ref 35–104)
ALT SERPL-CCNC: 52 U/L (ref 5–33)
ANION GAP SERPL CALCULATED.3IONS-SCNC: 6 MMOL/L (ref 9–17)
AST SERPL-CCNC: 19 U/L
BASOPHILS # BLD: 0.04 K/UL (ref 0–0.2)
BASOPHILS NFR BLD: 1 % (ref 0–2)
BILIRUB SERPL-MCNC: 0.3 MG/DL (ref 0.3–1.2)
BUN SERPL-MCNC: 18 MG/DL (ref 8–23)
BUN/CREAT SERPL: 18 (ref 9–20)
CALCIUM SERPL-MCNC: 9.3 MG/DL (ref 8.6–10.4)
CHLORIDE SERPL-SCNC: 101 MMOL/L (ref 98–107)
CHOLEST SERPL-MCNC: 127 MG/DL
CHOLESTEROL/HDL RATIO: 3
CO2 SERPL-SCNC: 28 MMOL/L (ref 20–31)
CREAT SERPL-MCNC: 1 MG/DL (ref 0.5–0.9)
EOSINOPHIL # BLD: 0.18 K/UL (ref 0–0.44)
EOSINOPHILS RELATIVE PERCENT: 2 % (ref 1–4)
ERYTHROCYTE [DISTWIDTH] IN BLOOD BY AUTOMATED COUNT: 12.8 % (ref 11.8–14.4)
GFR SERPL CREATININE-BSD FRML MDRD: >60 ML/MIN/1.73M2
GLUCOSE SERPL-MCNC: 338 MG/DL (ref 70–99)
HCT VFR BLD AUTO: 33.9 % (ref 36.3–47.1)
HDLC SERPL-MCNC: 43 MG/DL
HGB BLD-MCNC: 11.3 G/DL (ref 11.9–15.1)
IMM GRANULOCYTES # BLD AUTO: 0.03 K/UL (ref 0–0.3)
IMM GRANULOCYTES NFR BLD: 0 %
LDLC SERPL CALC-MCNC: 65 MG/DL (ref 0–130)
LYMPHOCYTES NFR BLD: 1.79 K/UL (ref 1.1–3.7)
LYMPHOCYTES RELATIVE PERCENT: 22 % (ref 24–43)
MCH RBC QN AUTO: 31.4 PG (ref 25.2–33.5)
MCHC RBC AUTO-ENTMCNC: 33.3 G/DL (ref 25.2–33.5)
MCV RBC AUTO: 94.2 FL (ref 82.6–102.9)
MONOCYTES NFR BLD: 0.52 K/UL (ref 0.1–1.2)
MONOCYTES NFR BLD: 6 % (ref 3–12)
NEUTROPHILS NFR BLD: 69 % (ref 36–65)
NEUTS SEG NFR BLD: 5.67 K/UL (ref 1.5–8.1)
NRBC BLD-RTO: 0 PER 100 WBC
PLATELET # BLD AUTO: 255 K/UL (ref 138–453)
PMV BLD AUTO: 10.3 FL (ref 8.1–13.5)
POTASSIUM SERPL-SCNC: 5.4 MMOL/L (ref 3.7–5.3)
PROT SERPL-MCNC: 6.7 G/DL (ref 6.4–8.3)
RBC # BLD AUTO: 3.6 M/UL (ref 3.95–5.11)
SODIUM SERPL-SCNC: 135 MMOL/L (ref 135–144)
TRIGL SERPL-MCNC: 93 MG/DL
WBC OTHER # BLD: 8.2 K/UL (ref 3.5–11.3)

## 2023-08-16 PROCEDURE — 83036 HEMOGLOBIN GLYCOSYLATED A1C: CPT

## 2023-08-16 PROCEDURE — 80053 COMPREHEN METABOLIC PANEL: CPT

## 2023-08-16 PROCEDURE — 3046F HEMOGLOBIN A1C LEVEL >9.0%: CPT | Performed by: FAMILY MEDICINE

## 2023-08-16 PROCEDURE — G8417 CALC BMI ABV UP PARAM F/U: HCPCS | Performed by: FAMILY MEDICINE

## 2023-08-16 PROCEDURE — 99213 OFFICE O/P EST LOW 20 MIN: CPT | Performed by: FAMILY MEDICINE

## 2023-08-16 PROCEDURE — 2022F DILAT RTA XM EVC RTNOPTHY: CPT | Performed by: FAMILY MEDICINE

## 2023-08-16 PROCEDURE — G8427 DOCREV CUR MEDS BY ELIG CLIN: HCPCS | Performed by: FAMILY MEDICINE

## 2023-08-16 PROCEDURE — 80061 LIPID PANEL: CPT

## 2023-08-16 PROCEDURE — 36415 COLL VENOUS BLD VENIPUNCTURE: CPT

## 2023-08-16 PROCEDURE — 1036F TOBACCO NON-USER: CPT | Performed by: FAMILY MEDICINE

## 2023-08-16 PROCEDURE — 3078F DIAST BP <80 MM HG: CPT | Performed by: FAMILY MEDICINE

## 2023-08-16 PROCEDURE — 85025 COMPLETE CBC W/AUTO DIFF WBC: CPT

## 2023-08-16 PROCEDURE — 99214 OFFICE O/P EST MOD 30 MIN: CPT | Performed by: FAMILY MEDICINE

## 2023-08-16 PROCEDURE — 3017F COLORECTAL CA SCREEN DOC REV: CPT | Performed by: FAMILY MEDICINE

## 2023-08-16 PROCEDURE — 3074F SYST BP LT 130 MM HG: CPT | Performed by: FAMILY MEDICINE

## 2023-08-16 ASSESSMENT — ENCOUNTER SYMPTOMS
DIARRHEA: 1
EYES NEGATIVE: 1
WHEEZING: 0
ALLERGIC/IMMUNOLOGIC NEGATIVE: 1
SHORTNESS OF BREATH: 0

## 2023-08-16 NOTE — PROGRESS NOTES
Subjective:      Patient ID: Hemant Benton is a 59 y.o. female. Diabetes  Pertinent negatives for hypoglycemia include no nervousness/anxiousness. Associated symptoms include fatigue. Pertinent negatives for diabetes include no chest pain. Other  Associated symptoms include arthralgias (right elbow off an on s/p surgery), fatigue and myalgias. Pertinent negatives include no chest pain. Leg Pain     Shoulder Pain     Hypertension  Pertinent negatives include no chest pain, palpitations or shortness of breath. Hyperlipidemia  Associated symptoms include leg pain and myalgias. Pertinent negatives include no chest pain or shortness of breath. Coronary Artery Disease  Symptoms include leg swelling (mild, varies). Pertinent negatives include no chest pain, palpitations or shortness of breath. Risk factors include hyperlipidemia. Gastroesophageal Reflux  She reports no chest pain or no wheezing. Associated symptoms include fatigue. Diarrhea   Associated symptoms include arthralgias (right elbow off an on s/p surgery) and myalgias. Routine follow up on chronic medical conditions, refills, and review of updated labs. I have reviewed the patient's medical history in detail and updated the computerized patient record. Currently living in [facility] with her parents. Step father with liver cancer and passed in April. She is staying with her mother 81 to help her out after the loss of her . Blood sugars fair by report. No lows of concern. Using small snack at night. Depression good, no mood concerns. She has some winter time exacerbations and is watching things closely. Did well overall this winter. Overall doing well. Sleeping issues, mostly falling asleep taking an hour to 2 hours. Off caffeine. Has prn trazodone. . this is working well when needed. Using 2 tablets at times. Compliant with medicaitons. Not compliant with cpap.   She reports insurance wouldn't cover

## 2023-08-17 ENCOUNTER — TELEPHONE (OUTPATIENT)
Dept: FAMILY MEDICINE CLINIC | Age: 63
End: 2023-08-17

## 2023-08-17 LAB
EST. AVERAGE GLUCOSE BLD GHB EST-MCNC: 186 MG/DL
HBA1C MFR BLD: 8.1 % (ref 4–6)

## 2023-08-17 NOTE — TELEPHONE ENCOUNTER
[facility] to do [facility], if patient is out of state then we cannot do.

## 2023-09-06 ENCOUNTER — HOSPITAL ENCOUNTER (OUTPATIENT)
Dept: ULTRASOUND IMAGING | Age: 63
Discharge: HOME | End: 2023-09-08
Attending: FAMILY MEDICINE
Payer: MEDICARE

## 2023-09-06 ENCOUNTER — HOSPITAL ENCOUNTER (OUTPATIENT)
Dept: MAMMOGRAPHY | Age: 63
Discharge: HOME | End: 2023-09-08
Attending: FAMILY MEDICINE
Payer: MEDICARE

## 2023-09-06 VITALS — WEIGHT: 179 LBS | HEIGHT: 61 IN | BODY MASS INDEX: 33.79 KG/M2

## 2023-09-06 DIAGNOSIS — N63.20 MASS OF LEFT BREAST, UNSPECIFIED QUADRANT: ICD-10-CM

## 2023-09-06 DIAGNOSIS — I25.119 CORONARY ARTERY DISEASE INVOLVING NATIVE CORONARY ARTERY OF NATIVE HEART WITH ANGINA PECTORIS (HCC): ICD-10-CM

## 2023-09-06 DIAGNOSIS — D24.2 BENIGN NEOPLASM OF LEFT BREAST: ICD-10-CM

## 2023-09-06 PROCEDURE — G0279 TOMOSYNTHESIS, MAMMO: HCPCS

## 2023-09-06 PROCEDURE — 76642 ULTRASOUND BREAST LIMITED: CPT

## 2023-09-06 NOTE — TELEPHONE ENCOUNTER
Tricia Fernandez called requesting a refill of the below medication which has been pended for you:     Requested Prescriptions     Pending Prescriptions Disp Refills    atorvastatin (LIPITOR) 40 MG tablet 90 tablet 1     Si po daily       Last Appointment Date: 2023  Next Appointment Date: 2024    Allergies   Allergen Reactions    Codeine Nausea And Vomiting    Morphine Nausea And Vomiting

## 2023-09-11 RX ORDER — ATORVASTATIN CALCIUM 40 MG/1
TABLET, FILM COATED ORAL
Qty: 90 TABLET | Refills: 1 | Status: SHIPPED | OUTPATIENT
Start: 2023-09-11

## 2023-11-14 RX ORDER — AMLODIPINE BESYLATE 10 MG/1
TABLET ORAL
Qty: 90 TABLET | Refills: 1 | Status: SHIPPED | OUTPATIENT
Start: 2023-11-14

## 2023-11-14 RX ORDER — METOPROLOL TARTRATE 100 MG/1
TABLET ORAL
Qty: 180 TABLET | Refills: 1 | Status: SHIPPED | OUTPATIENT
Start: 2023-11-14

## 2023-11-14 RX ORDER — GLIMEPIRIDE 4 MG/1
TABLET ORAL
Qty: 180 TABLET | Refills: 1 | Status: SHIPPED | OUTPATIENT
Start: 2023-11-14

## 2023-11-14 RX ORDER — ESOMEPRAZOLE MAGNESIUM 40 MG/1
40 CAPSULE, DELAYED RELEASE ORAL DAILY
Qty: 90 CAPSULE | Refills: 1 | Status: SHIPPED | OUTPATIENT
Start: 2023-11-14

## 2023-11-14 NOTE — TELEPHONE ENCOUNTER
Yasmin Chen called requesting a refill of the below medication which has been pended for you:     Requested Prescriptions     Pending Prescriptions Disp Refills    metoprolol (LOPRESSOR) 100 MG tablet [Pharmacy Med Name: Metoprolol Tartrate 100 MG Oral Tablet] 180 tablet 1     Sig: Take 1 tablet by mouth twice daily    amLODIPine (NORVASC) 10 MG tablet [Pharmacy Med Name: amLODIPine Besylate 10 MG Oral Tablet] 90 tablet 1     Sig: Take 1 tablet by mouth once daily    esomeprazole (NEXIUM) 40 MG delayed release capsule [Pharmacy Med Name: Esomeprazole Magnesium 40 MG Oral Capsule Delayed Release] 90 capsule 1     Sig: Take 1 capsule by mouth once daily    glimepiride (AMARYL) 4 MG tablet [Pharmacy Med Name: Glimepiride 4 MG Oral Tablet] 180 tablet 1     Sig: TAKE 1 TABLET BY MOUTH TWICE DAILY WITH MEALS       Last Appointment Date: 8/16/2023  Next Appointment Date: 2/19/2024    Allergies   Allergen Reactions    Codeine Nausea And Vomiting    Morphine Nausea And Vomiting

## 2023-12-06 RX ORDER — METFORMIN HYDROCHLORIDE 500 MG/1
TABLET, EXTENDED RELEASE ORAL
Qty: 180 TABLET | Refills: 0 | Status: SHIPPED | OUTPATIENT
Start: 2023-12-06

## 2023-12-06 RX ORDER — CITALOPRAM 40 MG/1
TABLET ORAL
Qty: 90 TABLET | Refills: 0 | Status: SHIPPED | OUTPATIENT
Start: 2023-12-06

## 2023-12-06 RX ORDER — CLOPIDOGREL BISULFATE 75 MG/1
TABLET ORAL
Qty: 90 TABLET | Refills: 0 | Status: SHIPPED | OUTPATIENT
Start: 2023-12-06

## 2023-12-06 NOTE — TELEPHONE ENCOUNTER
Kyle Barber called requesting a refill of the below medication which has been pended for you:     Requested Prescriptions     Pending Prescriptions Disp Refills    citalopram (CELEXA) 40 MG tablet [Pharmacy Med Name: Citalopram Hydrobromide 40 MG Oral Tablet] 90 tablet 0     Sig: Take 1 tablet by mouth once daily    clopidogrel (PLAVIX) 75 MG tablet [Pharmacy Med Name: Clopidogrel Bisulfate 75 MG Oral Tablet] 90 tablet 0     Sig: Take 1 tablet by mouth once daily    metFORMIN (GLUCOPHAGE-XR) 500 MG extended release tablet [Pharmacy Med Name: metFORMIN HCl  MG Oral Tablet Extended Release 24 Hour] 180 tablet 0     Sig: Take 1 tablet by mouth twice daily       Last Appointment Date: 8/16/2023  Next Appointment Date: 2/19/2024    Allergies   Allergen Reactions    Codeine Nausea And Vomiting    Morphine Nausea And Vomiting

## 2024-02-15 RX ORDER — TRAZODONE HYDROCHLORIDE 100 MG/1
TABLET ORAL
Qty: 90 TABLET | Refills: 0 | Status: SHIPPED | OUTPATIENT
Start: 2024-02-15

## 2024-02-15 NOTE — TELEPHONE ENCOUNTER
Sunita called requesting a refill of the below medication which has been pended for you:     Requested Prescriptions     Pending Prescriptions Disp Refills    traZODone (DESYREL) 100 MG tablet [Pharmacy Med Name: traZODone HCl 100 MG Oral Tablet] 90 tablet 0     Sig: Take 1 tablet by mouth once daily       Last Appointment Date: 8/16/2023  Next Appointment Date: 2/20/2024    Allergies   Allergen Reactions    Codeine Nausea And Vomiting    Morphine Nausea And Vomiting

## 2024-02-20 ENCOUNTER — OFFICE VISIT (OUTPATIENT)
Dept: FAMILY MEDICINE CLINIC | Age: 64
End: 2024-02-20
Payer: MEDICARE

## 2024-02-20 VITALS
BODY MASS INDEX: 33.99 KG/M2 | HEART RATE: 82 BPM | TEMPERATURE: 99 F | WEIGHT: 180 LBS | HEIGHT: 61 IN | DIASTOLIC BLOOD PRESSURE: 60 MMHG | OXYGEN SATURATION: 94 % | SYSTOLIC BLOOD PRESSURE: 116 MMHG

## 2024-02-20 DIAGNOSIS — K21.9 GASTROESOPHAGEAL REFLUX DISEASE WITHOUT ESOPHAGITIS: ICD-10-CM

## 2024-02-20 DIAGNOSIS — R91.1 LUNG NODULE: ICD-10-CM

## 2024-02-20 DIAGNOSIS — I10 ESSENTIAL HYPERTENSION: ICD-10-CM

## 2024-02-20 DIAGNOSIS — Z12.11 COLON CANCER SCREENING: ICD-10-CM

## 2024-02-20 DIAGNOSIS — G47.33 OBSTRUCTIVE SLEEP APNEA: ICD-10-CM

## 2024-02-20 DIAGNOSIS — Z12.31 VISIT FOR SCREENING MAMMOGRAM: Primary | ICD-10-CM

## 2024-02-20 DIAGNOSIS — I25.10 CORONARY ARTERY DISEASE INVOLVING NATIVE CORONARY ARTERY OF NATIVE HEART WITHOUT ANGINA PECTORIS: ICD-10-CM

## 2024-02-20 DIAGNOSIS — E11.9 TYPE 2 DIABETES MELLITUS WITHOUT COMPLICATION, WITHOUT LONG-TERM CURRENT USE OF INSULIN (HCC): ICD-10-CM

## 2024-02-20 DIAGNOSIS — E66.09 CLASS 2 OBESITY DUE TO EXCESS CALORIES WITHOUT SERIOUS COMORBIDITY WITH BODY MASS INDEX (BMI) OF 37.0 TO 37.9 IN ADULT: ICD-10-CM

## 2024-02-20 DIAGNOSIS — E78.5 HYPERLIPIDEMIA, UNSPECIFIED HYPERLIPIDEMIA TYPE: ICD-10-CM

## 2024-02-20 DIAGNOSIS — E83.42 HYPOMAGNESEMIA: ICD-10-CM

## 2024-02-20 DIAGNOSIS — G47.00 INSOMNIA, UNSPECIFIED TYPE: ICD-10-CM

## 2024-02-20 DIAGNOSIS — I25.119 CORONARY ARTERY DISEASE INVOLVING NATIVE CORONARY ARTERY OF NATIVE HEART WITH ANGINA PECTORIS (HCC): ICD-10-CM

## 2024-02-20 DIAGNOSIS — F33.42 RECURRENT MAJOR DEPRESSIVE DISORDER, IN FULL REMISSION (HCC): ICD-10-CM

## 2024-02-20 PROCEDURE — G8417 CALC BMI ABV UP PARAM F/U: HCPCS | Performed by: FAMILY MEDICINE

## 2024-02-20 PROCEDURE — 2022F DILAT RTA XM EVC RTNOPTHY: CPT | Performed by: FAMILY MEDICINE

## 2024-02-20 PROCEDURE — 99213 OFFICE O/P EST LOW 20 MIN: CPT

## 2024-02-20 PROCEDURE — 3074F SYST BP LT 130 MM HG: CPT | Performed by: FAMILY MEDICINE

## 2024-02-20 PROCEDURE — 99214 OFFICE O/P EST MOD 30 MIN: CPT | Performed by: FAMILY MEDICINE

## 2024-02-20 PROCEDURE — 3078F DIAST BP <80 MM HG: CPT | Performed by: FAMILY MEDICINE

## 2024-02-20 PROCEDURE — 3046F HEMOGLOBIN A1C LEVEL >9.0%: CPT | Performed by: FAMILY MEDICINE

## 2024-02-20 PROCEDURE — 3017F COLORECTAL CA SCREEN DOC REV: CPT | Performed by: FAMILY MEDICINE

## 2024-02-20 PROCEDURE — 1036F TOBACCO NON-USER: CPT | Performed by: FAMILY MEDICINE

## 2024-02-20 PROCEDURE — G8484 FLU IMMUNIZE NO ADMIN: HCPCS | Performed by: FAMILY MEDICINE

## 2024-02-20 PROCEDURE — G8427 DOCREV CUR MEDS BY ELIG CLIN: HCPCS | Performed by: FAMILY MEDICINE

## 2024-02-20 RX ORDER — EZETIMIBE 10 MG/1
10 TABLET ORAL DAILY
Qty: 90 TABLET | Refills: 1 | Status: SHIPPED | OUTPATIENT
Start: 2024-02-20

## 2024-02-20 RX ORDER — BUPROPION HYDROCHLORIDE 150 MG/1
TABLET, EXTENDED RELEASE ORAL
Qty: 90 TABLET | Refills: 1 | Status: SHIPPED | OUTPATIENT
Start: 2024-02-20

## 2024-02-20 RX ORDER — CITALOPRAM 40 MG/1
40 TABLET ORAL DAILY
Qty: 90 TABLET | Refills: 1 | Status: SHIPPED | OUTPATIENT
Start: 2024-02-20

## 2024-02-20 RX ORDER — ESOMEPRAZOLE MAGNESIUM 40 MG/1
40 CAPSULE, DELAYED RELEASE ORAL DAILY
Qty: 90 CAPSULE | Refills: 1 | Status: SHIPPED | OUTPATIENT
Start: 2024-02-20

## 2024-02-20 RX ORDER — METFORMIN HYDROCHLORIDE 500 MG/1
TABLET, EXTENDED RELEASE ORAL
Qty: 180 TABLET | Refills: 1 | Status: SHIPPED | OUTPATIENT
Start: 2024-02-20

## 2024-02-20 RX ORDER — GLIMEPIRIDE 4 MG/1
4 TABLET ORAL 2 TIMES DAILY WITH MEALS
Qty: 180 TABLET | Refills: 1 | Status: SHIPPED | OUTPATIENT
Start: 2024-02-20

## 2024-02-20 RX ORDER — AMLODIPINE BESYLATE 10 MG/1
10 TABLET ORAL DAILY
Qty: 90 TABLET | Refills: 1 | Status: SHIPPED | OUTPATIENT
Start: 2024-02-20

## 2024-02-20 RX ORDER — METOPROLOL TARTRATE 100 MG/1
100 TABLET ORAL 2 TIMES DAILY
Qty: 180 TABLET | Refills: 1 | Status: SHIPPED | OUTPATIENT
Start: 2024-02-20

## 2024-02-20 RX ORDER — CLOPIDOGREL BISULFATE 75 MG/1
75 TABLET ORAL DAILY
Qty: 90 TABLET | Refills: 1 | Status: SHIPPED | OUTPATIENT
Start: 2024-02-20

## 2024-02-20 RX ORDER — ATORVASTATIN CALCIUM 40 MG/1
TABLET, FILM COATED ORAL
Qty: 90 TABLET | Refills: 1 | Status: SHIPPED | OUTPATIENT
Start: 2024-02-20

## 2024-02-20 RX ORDER — ISOSORBIDE MONONITRATE 30 MG/1
30 TABLET, EXTENDED RELEASE ORAL DAILY
Qty: 90 TABLET | Refills: 1 | Status: SHIPPED | OUTPATIENT
Start: 2024-02-20

## 2024-02-20 RX ORDER — NITROGLYCERIN 0.4 MG/1
0.4 TABLET SUBLINGUAL EVERY 5 MIN PRN
Qty: 25 TABLET | Refills: 1 | Status: SHIPPED | OUTPATIENT
Start: 2024-02-20

## 2024-02-20 RX ORDER — HYDROCHLOROTHIAZIDE 50 MG/1
50 TABLET ORAL DAILY
Qty: 90 TABLET | Refills: 1 | Status: SHIPPED | OUTPATIENT
Start: 2024-02-20

## 2024-02-20 RX ORDER — LOSARTAN POTASSIUM 100 MG/1
TABLET ORAL
Qty: 90 TABLET | Refills: 1 | Status: SHIPPED | OUTPATIENT
Start: 2024-02-20

## 2024-02-20 SDOH — ECONOMIC STABILITY: FOOD INSECURITY: WITHIN THE PAST 12 MONTHS, YOU WORRIED THAT YOUR FOOD WOULD RUN OUT BEFORE YOU GOT MONEY TO BUY MORE.: NEVER TRUE

## 2024-02-20 SDOH — ECONOMIC STABILITY: INCOME INSECURITY: HOW HARD IS IT FOR YOU TO PAY FOR THE VERY BASICS LIKE FOOD, HOUSING, MEDICAL CARE, AND HEATING?: NOT HARD AT ALL

## 2024-02-20 SDOH — ECONOMIC STABILITY: FOOD INSECURITY: WITHIN THE PAST 12 MONTHS, THE FOOD YOU BOUGHT JUST DIDN'T LAST AND YOU DIDN'T HAVE MONEY TO GET MORE.: NEVER TRUE

## 2024-02-20 ASSESSMENT — PATIENT HEALTH QUESTIONNAIRE - PHQ9
SUM OF ALL RESPONSES TO PHQ QUESTIONS 1-9: 0
8. MOVING OR SPEAKING SO SLOWLY THAT OTHER PEOPLE COULD HAVE NOTICED. OR THE OPPOSITE, BEING SO FIGETY OR RESTLESS THAT YOU HAVE BEEN MOVING AROUND A LOT MORE THAN USUAL: 0
2. FEELING DOWN, DEPRESSED OR HOPELESS: 0
SUM OF ALL RESPONSES TO PHQ QUESTIONS 1-9: 0
10. IF YOU CHECKED OFF ANY PROBLEMS, HOW DIFFICULT HAVE THESE PROBLEMS MADE IT FOR YOU TO DO YOUR WORK, TAKE CARE OF THINGS AT HOME, OR GET ALONG WITH OTHER PEOPLE: 0
1. LITTLE INTEREST OR PLEASURE IN DOING THINGS: 0
SUM OF ALL RESPONSES TO PHQ QUESTIONS 1-9: 0
SUM OF ALL RESPONSES TO PHQ9 QUESTIONS 1 & 2: 0
4. FEELING TIRED OR HAVING LITTLE ENERGY: 0
7. TROUBLE CONCENTRATING ON THINGS, SUCH AS READING THE NEWSPAPER OR WATCHING TELEVISION: 0
6. FEELING BAD ABOUT YOURSELF - OR THAT YOU ARE A FAILURE OR HAVE LET YOURSELF OR YOUR FAMILY DOWN: 0
3. TROUBLE FALLING OR STAYING ASLEEP: 0
SUM OF ALL RESPONSES TO PHQ QUESTIONS 1-9: 0
9. THOUGHTS THAT YOU WOULD BE BETTER OFF DEAD, OR OF HURTING YOURSELF: 0
5. POOR APPETITE OR OVEREATING: 0

## 2024-02-20 ASSESSMENT — ENCOUNTER SYMPTOMS
ALLERGIC/IMMUNOLOGIC NEGATIVE: 1
DIARRHEA: 1
WHEEZING: 0
SHORTNESS OF BREATH: 0
EYES NEGATIVE: 1
CHEST TIGHTNESS: 1

## 2024-02-20 NOTE — PROGRESS NOTES
Patient advised she is due for her diabetic eye exam and colonoscopy screen. She states she will check into this around where she lives.   
carbonated beverages. Doing ok with lifestyle mods at present. Recent upper gi symptoms and diarrhea/gas/bloating in the last 3 months.  Recurrent heartburn regularly.  Adding nexium daily. Hopefully see improvement in both upper and lower symptoms.       Hypomagnesemia: she stopped the supplement and levels went low again. She has not restarted yet.      Obesity: wt. Fairly stable . She continues to try and loose wt.  rec. Restricted carb. Diet.         Lung nodule; Incidental lung nodules found on cxr and CT scan 8/25/14. Unknown significance. No thoracic lymph nodes. No smoking history  CT confirms a 10 mm nodule in the left upper lobe (series 3, image 36)    with an adjacent 4 mm at the cardiac border. Linear densities in the    right middle lobe and both lower lobes compatible with subsegmental    atelectasis versus scarring. No pulmonary infiltrate.        No pleural or pericardial effusion.        No enlarged thoracic lymph node by CT criteria.          Updated CT showed unchanged nodularity 3/10/15;   IMPRESSION:    1. Unchanged pulmonary nodularity, largest 11 mm in the left upper lobe.    Stable findings in the left hilum and left superior mediastinum, may    represent small lymph nodes though of uncertain etiology or significance.    None of these are clearly calcified. PET/CT could be considered for    metabolic assessment and planning for any potential biopsy. Otherwise,    close CT followup would be recommended to ensure 2 years of stability.    2. Small hypodense thyroid nodules, which could be further    evaluated/followed up with ultrasound.               CT 7/30/18:     Impression    Stable CT with re- demonstration of few lung nodules largest again measuring    1.1 cm in the lingula with subtle calcification suggesting calcified    granuloma.  No acute process with few incidental/chronic findings as    described.         Chronic insomnia: good initial response to trazodone. Starting to see

## 2024-05-15 RX ORDER — TRAZODONE HYDROCHLORIDE 100 MG/1
TABLET ORAL
Qty: 90 TABLET | Refills: 1 | Status: SHIPPED | OUTPATIENT
Start: 2024-05-15

## 2024-05-15 NOTE — TELEPHONE ENCOUNTER
Sunita called requesting a refill of the below medication which has been pended for you:     Requested Prescriptions     Pending Prescriptions Disp Refills    traZODone (DESYREL) 100 MG tablet [Pharmacy Med Name: traZODone HCl 100 MG Oral Tablet] 90 tablet 1     Sig: Take 1 tablet by mouth once daily       Last Appointment Date: 2/20/2024  Next Appointment Date: 9/10/2024    Allergies   Allergen Reactions    Codeine Nausea And Vomiting    Morphine Nausea And Vomiting

## 2024-09-13 ENCOUNTER — OFFICE VISIT (OUTPATIENT)
Dept: FAMILY MEDICINE CLINIC | Age: 64
End: 2024-09-13

## 2024-09-13 VITALS
DIASTOLIC BLOOD PRESSURE: 70 MMHG | HEART RATE: 79 BPM | BODY MASS INDEX: 33.46 KG/M2 | WEIGHT: 177.2 LBS | OXYGEN SATURATION: 96 % | HEIGHT: 61 IN | SYSTOLIC BLOOD PRESSURE: 118 MMHG

## 2024-09-13 DIAGNOSIS — E78.5 HYPERLIPIDEMIA, UNSPECIFIED HYPERLIPIDEMIA TYPE: ICD-10-CM

## 2024-09-13 DIAGNOSIS — E83.42 HYPOMAGNESEMIA: ICD-10-CM

## 2024-09-13 DIAGNOSIS — E66.09 CLASS 2 OBESITY DUE TO EXCESS CALORIES WITHOUT SERIOUS COMORBIDITY WITH BODY MASS INDEX (BMI) OF 37.0 TO 37.9 IN ADULT: ICD-10-CM

## 2024-09-13 DIAGNOSIS — R91.1 LUNG NODULE: ICD-10-CM

## 2024-09-13 DIAGNOSIS — Z12.11 COLON CANCER SCREENING: ICD-10-CM

## 2024-09-13 DIAGNOSIS — E11.9 TYPE 2 DIABETES MELLITUS WITHOUT COMPLICATION, WITHOUT LONG-TERM CURRENT USE OF INSULIN (HCC): ICD-10-CM

## 2024-09-13 DIAGNOSIS — K21.9 GASTROESOPHAGEAL REFLUX DISEASE WITHOUT ESOPHAGITIS: ICD-10-CM

## 2024-09-13 DIAGNOSIS — I25.119 CORONARY ARTERY DISEASE INVOLVING NATIVE CORONARY ARTERY OF NATIVE HEART WITH ANGINA PECTORIS (HCC): Primary | ICD-10-CM

## 2024-09-13 DIAGNOSIS — I10 ESSENTIAL HYPERTENSION: ICD-10-CM

## 2024-09-13 DIAGNOSIS — G47.00 INSOMNIA, UNSPECIFIED TYPE: ICD-10-CM

## 2024-09-13 DIAGNOSIS — E11.3299 BACKGROUND DIABETIC RETINOPATHY (HCC): ICD-10-CM

## 2024-09-13 DIAGNOSIS — Z12.31 VISIT FOR SCREENING MAMMOGRAM: ICD-10-CM

## 2024-09-13 DIAGNOSIS — G47.33 OBSTRUCTIVE SLEEP APNEA: ICD-10-CM

## 2024-09-13 DIAGNOSIS — F33.42 RECURRENT MAJOR DEPRESSIVE DISORDER, IN FULL REMISSION (HCC): ICD-10-CM

## 2024-09-13 LAB
ESTIMATED AVERAGE GLUCOSE: NORMAL
ESTIMATED AVERAGE GLUCOSE: NORMAL
HBA1C MFR BLD: 8.1 %
HBA1C MFR BLD: 8.1 %

## 2024-09-13 RX ORDER — ORAL SEMAGLUTIDE 7 MG/1
7 TABLET ORAL DAILY
Qty: 30 TABLET | Refills: 4 | Status: SHIPPED | OUTPATIENT
Start: 2024-09-13

## 2024-09-13 RX ORDER — RANOLAZINE 500 MG/1
500 TABLET, EXTENDED RELEASE ORAL 2 TIMES DAILY
COMMUNITY
Start: 2024-08-16 | End: 2025-08-16

## 2024-09-13 ASSESSMENT — ENCOUNTER SYMPTOMS
WHEEZING: 0
CHEST TIGHTNESS: 1
DIARRHEA: 1
EYES NEGATIVE: 1
SHORTNESS OF BREATH: 0
ALLERGIC/IMMUNOLOGIC NEGATIVE: 1

## 2024-10-04 DIAGNOSIS — I25.119 CORONARY ARTERY DISEASE INVOLVING NATIVE CORONARY ARTERY OF NATIVE HEART WITH ANGINA PECTORIS (HCC): ICD-10-CM

## 2024-10-04 DIAGNOSIS — I10 PRIMARY HYPERTENSION: Primary | ICD-10-CM

## 2024-10-04 RX ORDER — LOSARTAN POTASSIUM 100 MG/1
TABLET ORAL
Qty: 90 TABLET | Refills: 0 | Status: SHIPPED | OUTPATIENT
Start: 2024-10-04

## 2024-10-04 RX ORDER — ATORVASTATIN CALCIUM 40 MG/1
TABLET, FILM COATED ORAL
Qty: 90 TABLET | Refills: 1 | Status: SHIPPED | OUTPATIENT
Start: 2024-10-04

## 2024-10-04 NOTE — TELEPHONE ENCOUNTER
Sunita called requesting a refill of the below medication which has been pended for you:     Requested Prescriptions     Pending Prescriptions Disp Refills    losartan (COZAAR) 100 MG tablet [Pharmacy Med Name: Losartan Potassium 100 MG Oral Tablet] 90 tablet 0     Sig: Take 1 tablet by mouth once daily       Last Appointment Date: 9/13/2024  Next Appointment Date: 3/13/2025    Allergies   Allergen Reactions    Codeine Nausea And Vomiting    Morphine Nausea And Vomiting

## 2024-10-04 NOTE — TELEPHONE ENCOUNTER
Sunita called requesting a refill of the below medication which has been pended for you:     Requested Prescriptions     Pending Prescriptions Disp Refills    atorvastatin (LIPITOR) 40 MG tablet [Pharmacy Med Name: Atorvastatin Calcium 40 MG Oral Tablet] 90 tablet 1     Sig: Take 1 tablet by mouth once daily       Last Appointment Date: 9/13/2024  Next Appointment Date: 3/13/2025    Allergies   Allergen Reactions    Codeine Nausea And Vomiting    Morphine Nausea And Vomiting

## 2024-10-08 DIAGNOSIS — E78.5 HYPERLIPIDEMIA, UNSPECIFIED HYPERLIPIDEMIA TYPE: ICD-10-CM

## 2024-10-08 RX ORDER — BUPROPION HYDROCHLORIDE 150 MG/1
TABLET, EXTENDED RELEASE ORAL
Qty: 90 TABLET | Refills: 1 | Status: SHIPPED | OUTPATIENT
Start: 2024-10-08

## 2024-10-08 RX ORDER — CLOPIDOGREL BISULFATE 75 MG/1
75 TABLET ORAL DAILY
Qty: 90 TABLET | Refills: 1 | Status: SHIPPED | OUTPATIENT
Start: 2024-10-08

## 2024-10-08 RX ORDER — EZETIMIBE 10 MG/1
10 TABLET ORAL DAILY
Qty: 90 TABLET | Refills: 1 | Status: SHIPPED | OUTPATIENT
Start: 2024-10-08

## 2024-10-08 RX ORDER — CITALOPRAM HYDROBROMIDE 40 MG/1
40 TABLET ORAL DAILY
Qty: 90 TABLET | Refills: 1 | Status: SHIPPED | OUTPATIENT
Start: 2024-10-08

## 2024-10-08 NOTE — TELEPHONE ENCOUNTER
Sunita called requesting a refill of the below medication which has been pended for you:     Requested Prescriptions     Pending Prescriptions Disp Refills    buPROPion (WELLBUTRIN SR) 150 MG extended release tablet [Pharmacy Med Name: buPROPion HCl ER (SR) 150 MG Oral Tablet Extended Release 12 Hour] 90 tablet 1     Sig: Take 1 tablet by mouth once daily    ezetimibe (ZETIA) 10 MG tablet [Pharmacy Med Name: Ezetimibe 10 MG Oral Tablet] 90 tablet 1     Sig: Take 1 tablet by mouth once daily    citalopram (CELEXA) 40 MG tablet [Pharmacy Med Name: Citalopram Hydrobromide 40 MG Oral Tablet] 90 tablet 1     Sig: Take 1 tablet by mouth once daily    clopidogrel (PLAVIX) 75 MG tablet [Pharmacy Med Name: Clopidogrel Bisulfate 75 MG Oral Tablet] 90 tablet 1     Sig: Take 1 tablet by mouth once daily       Last Appointment Date: 9/13/2024  Next Appointment Date: 3/13/2025    Allergies   Allergen Reactions    Codeine Nausea And Vomiting    Morphine Nausea And Vomiting

## 2024-11-05 ENCOUNTER — TELEPHONE (OUTPATIENT)
Dept: FAMILY MEDICINE CLINIC | Age: 64
End: 2024-11-05

## 2024-11-12 DIAGNOSIS — G47.00 INSOMNIA, UNSPECIFIED TYPE: Primary | ICD-10-CM

## 2024-11-12 RX ORDER — METFORMIN HYDROCHLORIDE 500 MG/1
TABLET, EXTENDED RELEASE ORAL
Qty: 180 TABLET | Refills: 1 | Status: SHIPPED | OUTPATIENT
Start: 2024-11-12

## 2024-11-12 RX ORDER — TRAZODONE HYDROCHLORIDE 100 MG/1
TABLET ORAL
Qty: 90 TABLET | Refills: 1 | Status: SHIPPED | OUTPATIENT
Start: 2024-11-12

## 2024-11-12 NOTE — TELEPHONE ENCOUNTER
Sunita called requesting a refill of the below medication which has been pended for you:     Requested Prescriptions     Pending Prescriptions Disp Refills    traZODone (DESYREL) 100 MG tablet [Pharmacy Med Name: traZODone HCl 100 MG Oral Tablet] 90 tablet 1     Sig: Take 1 tablet by mouth once daily    metFORMIN (GLUCOPHAGE-XR) 500 MG extended release tablet [Pharmacy Med Name: metFORMIN HCl  MG Oral Tablet Extended Release 24 Hour] 180 tablet 1     Sig: Take 1 tablet by mouth twice daily       Last Appointment Date: 9/13/2024  Next Appointment Date: 3/13/2025    Allergies   Allergen Reactions    Codeine Nausea And Vomiting    Morphine Nausea And Vomiting

## 2024-12-16 NOTE — TELEPHONE ENCOUNTER
Sunita called requesting a refill of the below medication which has been pended for you:     Requested Prescriptions     Pending Prescriptions Disp Refills    linagliptin (TRADJENTA) 5 MG tablet [Pharmacy Med Name: Tradjenta 5 MG Oral Tablet] 90 tablet 0     Sig: Take 1 tablet by mouth once daily       Last Appointment Date: 9/13/2024  Next Appointment Date: 3/13/2025    Allergies   Allergen Reactions    Codeine Nausea And Vomiting    Morphine Nausea And Vomiting

## 2025-01-17 RX ORDER — ESOMEPRAZOLE MAGNESIUM 40 MG/1
40 CAPSULE, DELAYED RELEASE ORAL DAILY
Qty: 90 CAPSULE | Refills: 0 | Status: SHIPPED | OUTPATIENT
Start: 2025-01-17

## 2025-01-17 NOTE — TELEPHONE ENCOUNTER
Sunita called requesting a refill of the below medication which has been pended for you:     Requested Prescriptions     Pending Prescriptions Disp Refills    esomeprazole (NEXIUM) 40 MG delayed release capsule [Pharmacy Med Name: Esomeprazole Magnesium 40 MG Oral Capsule Delayed Release] 90 capsule 0     Sig: Take 1 capsule by mouth once daily       Last Appointment Date: 9/13/2024  Next Appointment Date: 3/13/2025    Allergies   Allergen Reactions    Codeine Nausea And Vomiting    Morphine Nausea And Vomiting

## 2025-03-13 ENCOUNTER — HOSPITAL ENCOUNTER (OUTPATIENT)
Age: 65
Discharge: HOME | End: 2025-03-13
Payer: MEDICARE

## 2025-03-13 ENCOUNTER — RESULTS FOLLOW-UP (OUTPATIENT)
Dept: FAMILY MEDICINE CLINIC | Age: 65
End: 2025-03-13

## 2025-03-13 DIAGNOSIS — E78.5 HYPERLIPIDEMIA, UNSPECIFIED HYPERLIPIDEMIA TYPE: ICD-10-CM

## 2025-03-13 DIAGNOSIS — I10 ESSENTIAL HYPERTENSION: ICD-10-CM

## 2025-03-13 DIAGNOSIS — E11.9 TYPE 2 DIABETES MELLITUS WITHOUT COMPLICATION, WITHOUT LONG-TERM CURRENT USE OF INSULIN (HCC): ICD-10-CM

## 2025-03-13 LAB
ALBUMIN SERPL-MCNC: 3.7 G/DL (ref 3.5–5.2)
ALBUMIN/GLOB SERPL: 1.5 {RATIO} (ref 1–2.5)
ALP SERPL-CCNC: 85 U/L (ref 35–104)
ALT SERPL-CCNC: 35 U/L (ref 5–33)
ANION GAP SERPL CALCULATED.3IONS-SCNC: 10 MMOL/L (ref 9–17)
AST SERPL-CCNC: 27 U/L
BASOPHILS # BLD: 0.03 K/UL (ref 0–0.2)
BASOPHILS NFR BLD: 0 % (ref 0–2)
BILIRUB SERPL-MCNC: 0.2 MG/DL (ref 0.3–1.2)
BUN SERPL-MCNC: 22 MG/DL (ref 8–23)
BUN/CREAT SERPL: 15 (ref 9–20)
CALCIUM SERPL-MCNC: 8.7 MG/DL (ref 8.6–10.4)
CHLORIDE SERPL-SCNC: 104 MMOL/L (ref 98–107)
CHOLEST SERPL-MCNC: 113 MG/DL (ref 0–199)
CHOLESTEROL/HDL RATIO: 2.9
CO2 SERPL-SCNC: 26 MMOL/L (ref 20–31)
CREAT SERPL-MCNC: 1.5 MG/DL (ref 0.5–0.9)
EOSINOPHIL # BLD: 0.37 K/UL (ref 0–0.44)
EOSINOPHILS RELATIVE PERCENT: 4 % (ref 1–4)
ERYTHROCYTE [DISTWIDTH] IN BLOOD BY AUTOMATED COUNT: 12.6 % (ref 11.8–14.4)
EST. AVERAGE GLUCOSE BLD GHB EST-MCNC: 177 MG/DL
GFR, ESTIMATED: 39 ML/MIN/1.73M2
GLUCOSE SERPL-MCNC: 212 MG/DL (ref 70–99)
HBA1C MFR BLD: 7.8 % (ref 4–6)
HCT VFR BLD AUTO: 32.3 % (ref 36.3–47.1)
HDLC SERPL-MCNC: 39 MG/DL
HGB BLD-MCNC: 10.9 G/DL (ref 11.9–15.1)
IMM GRANULOCYTES # BLD AUTO: 0.05 K/UL (ref 0–0.3)
IMM GRANULOCYTES NFR BLD: 1 %
LDLC SERPL CALC-MCNC: 50 MG/DL (ref 0–100)
LYMPHOCYTES NFR BLD: 2.16 K/UL (ref 1.1–3.7)
LYMPHOCYTES RELATIVE PERCENT: 24 % (ref 24–43)
MCH RBC QN AUTO: 31.6 PG (ref 25.2–33.5)
MCHC RBC AUTO-ENTMCNC: 33.7 G/DL (ref 25.2–33.5)
MCV RBC AUTO: 93.6 FL (ref 82.6–102.9)
MONOCYTES NFR BLD: 0.67 K/UL (ref 0.1–1.2)
MONOCYTES NFR BLD: 7 % (ref 3–12)
NEUTROPHILS NFR BLD: 64 % (ref 36–65)
NEUTS SEG NFR BLD: 5.92 K/UL (ref 1.5–8.1)
NRBC BLD-RTO: 0 PER 100 WBC
PLATELET # BLD AUTO: 246 K/UL (ref 138–453)
PMV BLD AUTO: 10.5 FL (ref 8.1–13.5)
POTASSIUM SERPL-SCNC: 4.2 MMOL/L (ref 3.7–5.3)
PROT SERPL-MCNC: 6.2 G/DL (ref 6.4–8.3)
RBC # BLD AUTO: 3.45 M/UL (ref 3.95–5.11)
SODIUM SERPL-SCNC: 140 MMOL/L (ref 135–144)
TRIGL SERPL-MCNC: 120 MG/DL
VLDLC SERPL CALC-MCNC: 24 MG/DL (ref 1–30)
WBC OTHER # BLD: 9.2 K/UL (ref 3.5–11.3)

## 2025-03-13 PROCEDURE — 80053 COMPREHEN METABOLIC PANEL: CPT

## 2025-03-13 PROCEDURE — 83036 HEMOGLOBIN GLYCOSYLATED A1C: CPT

## 2025-03-13 PROCEDURE — 85025 COMPLETE CBC W/AUTO DIFF WBC: CPT

## 2025-03-13 PROCEDURE — 80061 LIPID PANEL: CPT

## 2025-03-13 PROCEDURE — 36415 COLL VENOUS BLD VENIPUNCTURE: CPT

## 2025-03-24 RX ORDER — GLIMEPIRIDE 4 MG/1
4 TABLET ORAL 2 TIMES DAILY WITH MEALS
Qty: 180 TABLET | Refills: 1 | Status: SHIPPED | OUTPATIENT
Start: 2025-03-24

## 2025-03-24 NOTE — TELEPHONE ENCOUNTER
Sunita called requesting a refill of the below medication which has been pended for you:     Requested Prescriptions     Pending Prescriptions Disp Refills    glimepiride (AMARYL) 4 MG tablet [Pharmacy Med Name: Glimepiride 4 MG Oral Tablet] 180 tablet 1     Sig: TAKE 1 TABLET BY MOUTH TWICE DAILY WITH MEALS       Last Appointment Date: 9/13/2024  Next Appointment Date: 4/7/2025    Allergies   Allergen Reactions    Codeine Nausea And Vomiting    Morphine Nausea And Vomiting

## 2025-04-07 ENCOUNTER — OFFICE VISIT (OUTPATIENT)
Dept: FAMILY MEDICINE CLINIC | Age: 65
End: 2025-04-07
Payer: MEDICARE

## 2025-04-07 ENCOUNTER — TELEPHONE (OUTPATIENT)
Dept: SURGERY | Age: 65
End: 2025-04-07

## 2025-04-07 VITALS
BODY MASS INDEX: 33.34 KG/M2 | SYSTOLIC BLOOD PRESSURE: 132 MMHG | HEART RATE: 97 BPM | WEIGHT: 176.6 LBS | OXYGEN SATURATION: 96 % | DIASTOLIC BLOOD PRESSURE: 76 MMHG | RESPIRATION RATE: 16 BRPM | HEIGHT: 61 IN

## 2025-04-07 DIAGNOSIS — E66.9 TYPE 2 DIABETES MELLITUS WITH OBESITY (HCC): ICD-10-CM

## 2025-04-07 DIAGNOSIS — I25.10 CORONARY ARTERY DISEASE INVOLVING NATIVE CORONARY ARTERY OF NATIVE HEART WITHOUT ANGINA PECTORIS: ICD-10-CM

## 2025-04-07 DIAGNOSIS — F33.42 RECURRENT MAJOR DEPRESSIVE DISORDER, IN FULL REMISSION: ICD-10-CM

## 2025-04-07 DIAGNOSIS — Z12.31 VISIT FOR SCREENING MAMMOGRAM: ICD-10-CM

## 2025-04-07 DIAGNOSIS — G47.33 OBSTRUCTIVE SLEEP APNEA: ICD-10-CM

## 2025-04-07 DIAGNOSIS — E66.09 CLASS 2 OBESITY DUE TO EXCESS CALORIES WITHOUT SERIOUS COMORBIDITY WITH BODY MASS INDEX (BMI) OF 37.0 TO 37.9 IN ADULT: ICD-10-CM

## 2025-04-07 DIAGNOSIS — E11.69 TYPE 2 DIABETES MELLITUS WITH OBESITY (HCC): ICD-10-CM

## 2025-04-07 DIAGNOSIS — E66.812 CLASS 2 OBESITY DUE TO EXCESS CALORIES WITHOUT SERIOUS COMORBIDITY WITH BODY MASS INDEX (BMI) OF 37.0 TO 37.9 IN ADULT: ICD-10-CM

## 2025-04-07 DIAGNOSIS — G47.00 INSOMNIA, UNSPECIFIED TYPE: ICD-10-CM

## 2025-04-07 DIAGNOSIS — I10 PRIMARY HYPERTENSION: Primary | ICD-10-CM

## 2025-04-07 DIAGNOSIS — E78.5 HYPERLIPIDEMIA, UNSPECIFIED HYPERLIPIDEMIA TYPE: ICD-10-CM

## 2025-04-07 DIAGNOSIS — I25.119 CORONARY ARTERY DISEASE INVOLVING NATIVE CORONARY ARTERY OF NATIVE HEART WITH ANGINA PECTORIS: ICD-10-CM

## 2025-04-07 DIAGNOSIS — Z12.11 COLON CANCER SCREENING: ICD-10-CM

## 2025-04-07 DIAGNOSIS — R91.1 LUNG NODULE: ICD-10-CM

## 2025-04-07 PROCEDURE — 3078F DIAST BP <80 MM HG: CPT | Performed by: FAMILY MEDICINE

## 2025-04-07 PROCEDURE — G8427 DOCREV CUR MEDS BY ELIG CLIN: HCPCS | Performed by: FAMILY MEDICINE

## 2025-04-07 PROCEDURE — 99213 OFFICE O/P EST LOW 20 MIN: CPT | Performed by: FAMILY MEDICINE

## 2025-04-07 PROCEDURE — 1036F TOBACCO NON-USER: CPT | Performed by: FAMILY MEDICINE

## 2025-04-07 PROCEDURE — 3017F COLORECTAL CA SCREEN DOC REV: CPT | Performed by: FAMILY MEDICINE

## 2025-04-07 PROCEDURE — G8417 CALC BMI ABV UP PARAM F/U: HCPCS | Performed by: FAMILY MEDICINE

## 2025-04-07 PROCEDURE — 3075F SYST BP GE 130 - 139MM HG: CPT | Performed by: FAMILY MEDICINE

## 2025-04-07 PROCEDURE — 3051F HG A1C>EQUAL 7.0%<8.0%: CPT | Performed by: FAMILY MEDICINE

## 2025-04-07 PROCEDURE — 99214 OFFICE O/P EST MOD 30 MIN: CPT | Performed by: FAMILY MEDICINE

## 2025-04-07 PROCEDURE — 2022F DILAT RTA XM EVC RTNOPTHY: CPT | Performed by: FAMILY MEDICINE

## 2025-04-07 RX ORDER — ESOMEPRAZOLE MAGNESIUM 40 MG/1
40 CAPSULE, DELAYED RELEASE ORAL DAILY
Qty: 90 CAPSULE | Refills: 0 | OUTPATIENT
Start: 2025-04-07

## 2025-04-07 RX ORDER — ISOSORBIDE MONONITRATE 60 MG/1
60 TABLET, EXTENDED RELEASE ORAL DAILY
COMMUNITY
Start: 2025-04-06

## 2025-04-07 RX ORDER — ISOSORBIDE MONONITRATE 30 MG/1
30 TABLET, EXTENDED RELEASE ORAL DAILY
Qty: 90 TABLET | Refills: 0 | OUTPATIENT
Start: 2025-04-07

## 2025-04-07 RX ORDER — EZETIMIBE 10 MG/1
10 TABLET ORAL DAILY
Qty: 90 TABLET | Refills: 1 | Status: SHIPPED | OUTPATIENT
Start: 2025-04-07

## 2025-04-07 RX ORDER — CLOPIDOGREL BISULFATE 75 MG/1
75 TABLET ORAL DAILY
Qty: 90 TABLET | Refills: 0 | OUTPATIENT
Start: 2025-04-07

## 2025-04-07 RX ORDER — BUPROPION HYDROCHLORIDE 150 MG/1
TABLET, EXTENDED RELEASE ORAL
Qty: 90 TABLET | Refills: 1 | Status: SHIPPED | OUTPATIENT
Start: 2025-04-07

## 2025-04-07 RX ORDER — CLOPIDOGREL BISULFATE 75 MG/1
75 TABLET ORAL DAILY
Qty: 90 TABLET | Refills: 1 | Status: SHIPPED | OUTPATIENT
Start: 2025-04-07

## 2025-04-07 RX ORDER — NITROGLYCERIN 0.4 MG/1
0.4 TABLET SUBLINGUAL EVERY 5 MIN PRN
Qty: 25 TABLET | Refills: 1 | Status: SHIPPED | OUTPATIENT
Start: 2025-04-07

## 2025-04-07 RX ORDER — BUPROPION HYDROCHLORIDE 150 MG/1
150 TABLET, EXTENDED RELEASE ORAL DAILY
Qty: 90 TABLET | Refills: 0 | OUTPATIENT
Start: 2025-04-07

## 2025-04-07 RX ORDER — AMLODIPINE BESYLATE 10 MG/1
10 TABLET ORAL DAILY
Qty: 90 TABLET | Refills: 1 | Status: SHIPPED | OUTPATIENT
Start: 2025-04-07

## 2025-04-07 RX ORDER — CITALOPRAM HYDROBROMIDE 40 MG/1
40 TABLET ORAL DAILY
Qty: 90 TABLET | Refills: 0 | OUTPATIENT
Start: 2025-04-07

## 2025-04-07 RX ORDER — HYDROCHLOROTHIAZIDE 50 MG/1
50 TABLET ORAL DAILY
Qty: 90 TABLET | Refills: 1 | Status: SHIPPED | OUTPATIENT
Start: 2025-04-07

## 2025-04-07 RX ORDER — TRAZODONE HYDROCHLORIDE 100 MG/1
TABLET ORAL
Qty: 90 TABLET | Refills: 1 | Status: SHIPPED | OUTPATIENT
Start: 2025-04-07

## 2025-04-07 RX ORDER — LOSARTAN POTASSIUM 100 MG/1
TABLET ORAL
Qty: 90 TABLET | Refills: 0 | Status: SHIPPED | OUTPATIENT
Start: 2025-04-07

## 2025-04-07 RX ORDER — METOPROLOL TARTRATE 100 MG/1
100 TABLET ORAL 2 TIMES DAILY
Qty: 180 TABLET | Refills: 1 | Status: SHIPPED | OUTPATIENT
Start: 2025-04-07

## 2025-04-07 RX ORDER — METFORMIN HYDROCHLORIDE 500 MG/1
TABLET, EXTENDED RELEASE ORAL
Qty: 180 TABLET | Refills: 1 | Status: SHIPPED | OUTPATIENT
Start: 2025-04-07

## 2025-04-07 RX ORDER — AMLODIPINE BESYLATE 10 MG/1
10 TABLET ORAL DAILY
Qty: 90 TABLET | Refills: 0 | OUTPATIENT
Start: 2025-04-07

## 2025-04-07 RX ORDER — CITALOPRAM HYDROBROMIDE 40 MG/1
40 TABLET ORAL DAILY
Qty: 90 TABLET | Refills: 1 | Status: SHIPPED | OUTPATIENT
Start: 2025-04-07

## 2025-04-07 RX ORDER — METOPROLOL TARTRATE 100 MG/1
100 TABLET ORAL 2 TIMES DAILY
Qty: 180 TABLET | Refills: 0 | OUTPATIENT
Start: 2025-04-07

## 2025-04-07 RX ORDER — ESOMEPRAZOLE MAGNESIUM 40 MG/1
40 CAPSULE, DELAYED RELEASE ORAL DAILY
Qty: 90 CAPSULE | Refills: 0 | Status: SHIPPED | OUTPATIENT
Start: 2025-04-07

## 2025-04-07 RX ORDER — ATORVASTATIN CALCIUM 40 MG/1
TABLET, FILM COATED ORAL
Qty: 90 TABLET | Refills: 1 | Status: SHIPPED | OUTPATIENT
Start: 2025-04-07

## 2025-04-07 SDOH — ECONOMIC STABILITY: FOOD INSECURITY: WITHIN THE PAST 12 MONTHS, YOU WORRIED THAT YOUR FOOD WOULD RUN OUT BEFORE YOU GOT MONEY TO BUY MORE.: NEVER TRUE

## 2025-04-07 SDOH — ECONOMIC STABILITY: FOOD INSECURITY: WITHIN THE PAST 12 MONTHS, THE FOOD YOU BOUGHT JUST DIDN'T LAST AND YOU DIDN'T HAVE MONEY TO GET MORE.: NEVER TRUE

## 2025-04-07 ASSESSMENT — PATIENT HEALTH QUESTIONNAIRE - PHQ9
3. TROUBLE FALLING OR STAYING ASLEEP: NOT AT ALL
2. FEELING DOWN, DEPRESSED OR HOPELESS: NOT AT ALL
SUM OF ALL RESPONSES TO PHQ QUESTIONS 1-9: 0
8. MOVING OR SPEAKING SO SLOWLY THAT OTHER PEOPLE COULD HAVE NOTICED. OR THE OPPOSITE, BEING SO FIGETY OR RESTLESS THAT YOU HAVE BEEN MOVING AROUND A LOT MORE THAN USUAL: NOT AT ALL
SUM OF ALL RESPONSES TO PHQ QUESTIONS 1-9: 0
10. IF YOU CHECKED OFF ANY PROBLEMS, HOW DIFFICULT HAVE THESE PROBLEMS MADE IT FOR YOU TO DO YOUR WORK, TAKE CARE OF THINGS AT HOME, OR GET ALONG WITH OTHER PEOPLE: NOT DIFFICULT AT ALL
6. FEELING BAD ABOUT YOURSELF - OR THAT YOU ARE A FAILURE OR HAVE LET YOURSELF OR YOUR FAMILY DOWN: NOT AT ALL
5. POOR APPETITE OR OVEREATING: NOT AT ALL
7. TROUBLE CONCENTRATING ON THINGS, SUCH AS READING THE NEWSPAPER OR WATCHING TELEVISION: NOT AT ALL
SUM OF ALL RESPONSES TO PHQ QUESTIONS 1-9: 0
9. THOUGHTS THAT YOU WOULD BE BETTER OFF DEAD, OR OF HURTING YOURSELF: NOT AT ALL
SUM OF ALL RESPONSES TO PHQ QUESTIONS 1-9: 0
1. LITTLE INTEREST OR PLEASURE IN DOING THINGS: NOT AT ALL
4. FEELING TIRED OR HAVING LITTLE ENERGY: NOT AT ALL

## 2025-04-07 ASSESSMENT — ENCOUNTER SYMPTOMS
CHEST TIGHTNESS: 0
SHORTNESS OF BREATH: 0
WHEEZING: 0
EYES NEGATIVE: 1
DIARRHEA: 1
ALLERGIC/IMMUNOLOGIC NEGATIVE: 1

## 2025-04-07 NOTE — PROGRESS NOTES
supplementation a try.  Will follow serially.  Rec. Colonoscopy screening again. Consider egd.

## 2025-04-07 NOTE — PATIENT INSTRUCTIONS
Hospital Outpatient Visit on 03/13/2025   Component Date Value Ref Range Status    Hemoglobin A1C 03/13/2025 7.8 (H)  4.0 - 6.0 % Final    Estimated Avg Glucose 03/13/2025 177  mg/dL Final    Comment: The ADA and AACC recommend providing the estimated average glucose result to permit better   patient understanding of their HBA1c result.      WBC 03/13/2025 9.2  3.5 - 11.3 k/uL Final    RBC 03/13/2025 3.45 (L)  3.95 - 5.11 m/uL Final    Hemoglobin 03/13/2025 10.9 (L)  11.9 - 15.1 g/dL Final    Hematocrit 03/13/2025 32.3 (L)  36.3 - 47.1 % Final    MCV 03/13/2025 93.6  82.6 - 102.9 fL Final    MCH 03/13/2025 31.6  25.2 - 33.5 pg Final    MCHC 03/13/2025 33.7 (H)  25.2 - 33.5 g/dL Final    RDW 03/13/2025 12.6  11.8 - 14.4 % Final    Platelets 03/13/2025 246  138 - 453 k/uL Final    MPV 03/13/2025 10.5  8.1 - 13.5 fL Final    NRBC Automated 03/13/2025 0.0  0.0 per 100 WBC Final    Neutrophils % 03/13/2025 64  36 - 65 % Final    Lymphocytes % 03/13/2025 24  24 - 43 % Final    Monocytes % 03/13/2025 7  3 - 12 % Final    Eosinophils % 03/13/2025 4  1 - 4 % Final    Basophils % 03/13/2025 0  0 - 2 % Final    Immature Granulocytes % 03/13/2025 1 (H)  0 % Final    Neutrophils Absolute 03/13/2025 5.92  1.50 - 8.10 k/uL Final    Lymphocytes Absolute 03/13/2025 2.16  1.10 - 3.70 k/uL Final    Monocytes Absolute 03/13/2025 0.67  0.10 - 1.20 k/uL Final    Eosinophils Absolute 03/13/2025 0.37  0.00 - 0.44 k/uL Final    Basophils Absolute 03/13/2025 0.03  0.00 - 0.20 k/uL Final    Immature Granulocytes Absolute 03/13/2025 0.05  0.00 - 0.30 k/uL Final    Sodium 03/13/2025 140  135 - 144 mmol/L Final    Potassium 03/13/2025 4.2  3.7 - 5.3 mmol/L Final    Chloride 03/13/2025 104  98 - 107 mmol/L Final    CO2 03/13/2025 26  20 - 31 mmol/L Final    Anion Gap 03/13/2025 10  9 - 17 mmol/L Final    Glucose 03/13/2025 212 (H)  70 - 99 mg/dL Final    BUN 03/13/2025 22  8 - 23 mg/dL Final    Creatinine 03/13/2025 1.5 (H)  0.5 - 0.9 mg/dL Final

## 2025-05-12 RX ORDER — LINAGLIPTIN 5 MG/1
5 TABLET, FILM COATED ORAL DAILY
Qty: 90 TABLET | Refills: 0 | Status: SHIPPED | OUTPATIENT
Start: 2025-05-12

## 2025-05-19 ENCOUNTER — TELEPHONE (OUTPATIENT)
Dept: SURGERY | Age: 65
End: 2025-05-19

## 2025-05-19 ENCOUNTER — HOSPITAL ENCOUNTER (OUTPATIENT)
Age: 65
Discharge: HOME | End: 2025-05-19
Payer: MEDICARE

## 2025-05-19 ENCOUNTER — PREP FOR PROCEDURE (OUTPATIENT)
Dept: SURGERY | Age: 65
End: 2025-05-19

## 2025-05-19 ENCOUNTER — OFFICE VISIT (OUTPATIENT)
Dept: SURGERY | Age: 65
End: 2025-05-19
Payer: MEDICARE

## 2025-05-19 VITALS
DIASTOLIC BLOOD PRESSURE: 60 MMHG | BODY MASS INDEX: 32.1 KG/M2 | SYSTOLIC BLOOD PRESSURE: 96 MMHG | HEIGHT: 61 IN | HEART RATE: 70 BPM | RESPIRATION RATE: 16 BRPM | WEIGHT: 170 LBS

## 2025-05-19 DIAGNOSIS — E11.69 TYPE 2 DIABETES MELLITUS WITH OBESITY (HCC): ICD-10-CM

## 2025-05-19 DIAGNOSIS — R11.0 NAUSEA: ICD-10-CM

## 2025-05-19 DIAGNOSIS — K92.1 MELENA: ICD-10-CM

## 2025-05-19 DIAGNOSIS — R10.9 ABDOMINAL PAIN, UNSPECIFIED ABDOMINAL LOCATION: ICD-10-CM

## 2025-05-19 DIAGNOSIS — E66.9 TYPE 2 DIABETES MELLITUS WITH OBESITY (HCC): ICD-10-CM

## 2025-05-19 DIAGNOSIS — R19.7 DIARRHEA, UNSPECIFIED TYPE: ICD-10-CM

## 2025-05-19 DIAGNOSIS — R10.9 ABDOMINAL PAIN, UNSPECIFIED ABDOMINAL LOCATION: Primary | ICD-10-CM

## 2025-05-19 DIAGNOSIS — D64.9 ANEMIA, UNSPECIFIED TYPE: ICD-10-CM

## 2025-05-19 DIAGNOSIS — D64.89 ANEMIA DUE TO OTHER CAUSE, NOT CLASSIFIED: ICD-10-CM

## 2025-05-19 LAB
ALBUMIN SERPL-MCNC: 3.9 G/DL (ref 3.5–5.2)
ALBUMIN/GLOB SERPL: 1.6 {RATIO} (ref 1–2.5)
ALP SERPL-CCNC: 95 U/L (ref 35–104)
ALT SERPL-CCNC: 43 U/L (ref 10–35)
ANION GAP SERPL CALCULATED.3IONS-SCNC: 9 MMOL/L (ref 9–16)
AST SERPL-CCNC: 25 U/L (ref 10–35)
BASOPHILS # BLD: 0.04 K/UL (ref 0–0.2)
BASOPHILS NFR BLD: 1 % (ref 0–2)
BILIRUB SERPL-MCNC: 0.2 MG/DL (ref 0–1.2)
BUN SERPL-MCNC: 21 MG/DL (ref 8–23)
BUN/CREAT SERPL: 13 (ref 9–20)
CALCIUM SERPL-MCNC: 8.8 MG/DL (ref 8.6–10.4)
CHLORIDE SERPL-SCNC: 107 MMOL/L (ref 98–107)
CO2 SERPL-SCNC: 25 MMOL/L (ref 20–31)
CREAT SERPL-MCNC: 1.6 MG/DL (ref 0.6–0.9)
EOSINOPHIL # BLD: 0.15 K/UL (ref 0–0.44)
EOSINOPHILS RELATIVE PERCENT: 2 % (ref 1–4)
ERYTHROCYTE [DISTWIDTH] IN BLOOD BY AUTOMATED COUNT: 12.7 % (ref 11.8–14.4)
GFR, ESTIMATED: 36 ML/MIN/1.73M2
GLUCOSE SERPL-MCNC: 137 MG/DL (ref 74–99)
HCT VFR BLD AUTO: 33.9 % (ref 36.3–47.1)
HGB BLD-MCNC: 11.1 G/DL (ref 11.9–15.1)
IMM GRANULOCYTES # BLD AUTO: 0.03 K/UL (ref 0–0.3)
IMM GRANULOCYTES NFR BLD: 0 %
LIPASE SERPL-CCNC: 31 U/L (ref 13–60)
LYMPHOCYTES NFR BLD: 2.17 K/UL (ref 1.1–3.7)
LYMPHOCYTES RELATIVE PERCENT: 25 % (ref 24–43)
MCH RBC QN AUTO: 31.2 PG (ref 25.2–33.5)
MCHC RBC AUTO-ENTMCNC: 32.7 G/DL (ref 25.2–33.5)
MCV RBC AUTO: 95.2 FL (ref 82.6–102.9)
MONOCYTES NFR BLD: 0.47 K/UL (ref 0.1–1.2)
MONOCYTES NFR BLD: 5 % (ref 3–12)
NEUTROPHILS NFR BLD: 67 % (ref 36–65)
NEUTS SEG NFR BLD: 5.85 K/UL (ref 1.5–8.1)
NRBC BLD-RTO: 0 PER 100 WBC
PLATELET # BLD AUTO: 260 K/UL (ref 138–453)
PMV BLD AUTO: 10.2 FL (ref 8.1–13.5)
POTASSIUM SERPL-SCNC: 4.7 MMOL/L (ref 3.7–5.3)
PROT SERPL-MCNC: 6.3 G/DL (ref 6.6–8.7)
RBC # BLD AUTO: 3.56 M/UL (ref 3.95–5.11)
SODIUM SERPL-SCNC: 141 MMOL/L (ref 136–145)
WBC OTHER # BLD: 8.7 K/UL (ref 3.5–11.3)

## 2025-05-19 PROCEDURE — 3078F DIAST BP <80 MM HG: CPT | Performed by: SURGERY

## 2025-05-19 PROCEDURE — 99204 OFFICE O/P NEW MOD 45 MIN: CPT | Performed by: SURGERY

## 2025-05-19 PROCEDURE — 2022F DILAT RTA XM EVC RTNOPTHY: CPT | Performed by: SURGERY

## 2025-05-19 PROCEDURE — 36415 COLL VENOUS BLD VENIPUNCTURE: CPT

## 2025-05-19 PROCEDURE — 83690 ASSAY OF LIPASE: CPT

## 2025-05-19 PROCEDURE — 85025 COMPLETE CBC W/AUTO DIFF WBC: CPT

## 2025-05-19 PROCEDURE — 1036F TOBACCO NON-USER: CPT | Performed by: SURGERY

## 2025-05-19 PROCEDURE — 3051F HG A1C>EQUAL 7.0%<8.0%: CPT | Performed by: SURGERY

## 2025-05-19 PROCEDURE — 99205 OFFICE O/P NEW HI 60 MIN: CPT | Performed by: SURGERY

## 2025-05-19 PROCEDURE — G8417 CALC BMI ABV UP PARAM F/U: HCPCS | Performed by: SURGERY

## 2025-05-19 PROCEDURE — 3074F SYST BP LT 130 MM HG: CPT | Performed by: SURGERY

## 2025-05-19 PROCEDURE — G8428 CUR MEDS NOT DOCUMENT: HCPCS | Performed by: SURGERY

## 2025-05-19 PROCEDURE — 80053 COMPREHEN METABOLIC PANEL: CPT

## 2025-05-19 PROCEDURE — 3017F COLORECTAL CA SCREEN DOC REV: CPT | Performed by: SURGERY

## 2025-05-19 RX ORDER — ONDANSETRON 4 MG/1
4 TABLET, ORALLY DISINTEGRATING ORAL 3 TIMES DAILY PRN
Qty: 21 TABLET | Refills: 1 | Status: SHIPPED | OUTPATIENT
Start: 2025-05-19

## 2025-05-19 NOTE — PROGRESS NOTES
Sunita Rubio is a 65 y.o. female      CC:    Anemia (3/13/2025 hemoglobin 10.9)  Nausea  Abdominal pain  Diarrhea  Screening CS    HISTORY OF PRESENT ILLNESS:    Patient is a 65-year-old female who is noticed increasing diarrhea about 6-10 watery stools per day in the last few months.  She has had loose stools for many years but this is much worse.  She also has some generalized abdominal pain.  She has had a lot of nausea.  She is describes melena.  She has had anemia with a hemoglobin of 10.9 on 3/13/2005.  She also describes a lot of nausea.    She has had an EGD in 2005 was found to have some inflammation in the stomach with some gastritis and was placed on Nexium for years.  She has never had a colonoscopy.      Reason for evaluation: anemia, abdominal pain, diarrhea    Reports slight decreased appetite as she is worried the pain and diarrhea will come after eating.    Nausea: yes, daily, when gets abdominal pain, will happen mainly in the middle of the night, going on for a few months but has worsened the past week  Vomiting: no  Heartburn:no  Dysphagia:no  Hematemesis:no  Epigastric pain:no  Anemia: yes, 3/13/2025 hemoglobin 10.9  Previous work up date:EGD 8/9/2005= erythema and congestion in the antrum compatible with mild gastritis, polyps in the stomach  Current Treatment:Nexium 40 mg daily has been on it for years      Abd pain: yes, mid abdomen, intermittently, will wake up throughout the night mostly every night due to pain  Anemia: yes, 3/13/2025 hemoglobin 10.9  Bloating:no  Diarrhea: yes, 6-10 episodes a day, last month has noticed has worsened; will have incontinence with diarrhea at night. Reports will have diarrhea while urinating and doesn't realize she is having a bowel movements; reports stools are watery   Constipation: no  Melena: yes, reports at times looked like coffee grounds and has a metallic smell  Hematochezia:no  Rectal Bleeding:no  Rectal/Anal Pain:no  Pruritus: no  Family

## 2025-05-19 NOTE — TELEPHONE ENCOUNTER
[facility]         Patient:Sunita Rubio           :1960           Surgical/Procedure Planned: EGD and Colonoscopy    Date & Location: 6/10/2025 at [facility]       Outpatient   Planned Length of OR: 1 hr    Sedation: intravenous sedation      Estimated Cardiac Risk for Non-Cardiac Surgery/Procedure     Low___X___ Moderate______ High______    Medication Instructions - Clarification needed by this date:     Tradjenta Hold __1_ Days  Glimepiride Hold __1_ Days  Metformin Hold _1__ Days        Provider:Dr. Wynn      Signature of Provider Giving Orders for Medication holds:  Electronically signed by Lj Hernandez MD on 2025 at 11:38 AM    _____________________________________________

## 2025-05-19 NOTE — PATIENT INSTRUCTIONS
Stool studies were ordered. Please complete as soon as possible. Can  stool study supplies in lab.    Lab work has also been ordered. Please complete as soon as possible. No appointment is needed, no fasting is needed.    CT scan is scheduled:  Arrival time:  Nothing to eat or drink 2 hours prior to scan.  If you need to reschedule, please contact radiology at [phone], option 1.

## 2025-05-23 NOTE — TELEPHONE ENCOUNTER
LM on voicemail requesting a call back to review medication hold instructions. Clinic number provided on voicemail.

## 2025-05-28 ENCOUNTER — OFFICE VISIT (OUTPATIENT)
Dept: FAMILY MEDICINE CLINIC | Age: 65
End: 2025-05-28
Payer: MEDICARE

## 2025-05-28 VITALS
HEIGHT: 61 IN | BODY MASS INDEX: 33.61 KG/M2 | SYSTOLIC BLOOD PRESSURE: 130 MMHG | WEIGHT: 178 LBS | HEART RATE: 67 BPM | DIASTOLIC BLOOD PRESSURE: 86 MMHG | OXYGEN SATURATION: 96 %

## 2025-05-28 DIAGNOSIS — N63.0 SUBCUTANEOUS NODULE OF BREAST: Primary | ICD-10-CM

## 2025-05-28 DIAGNOSIS — N64.9 DISORDER OF BREAST, UNSPECIFIED: ICD-10-CM

## 2025-05-28 PROCEDURE — 99212 OFFICE O/P EST SF 10 MIN: CPT | Performed by: FAMILY MEDICINE

## 2025-05-28 PROCEDURE — 99213 OFFICE O/P EST LOW 20 MIN: CPT | Performed by: FAMILY MEDICINE

## 2025-05-28 PROCEDURE — 1036F TOBACCO NON-USER: CPT | Performed by: FAMILY MEDICINE

## 2025-05-28 PROCEDURE — 3075F SYST BP GE 130 - 139MM HG: CPT | Performed by: FAMILY MEDICINE

## 2025-05-28 PROCEDURE — 3079F DIAST BP 80-89 MM HG: CPT | Performed by: FAMILY MEDICINE

## 2025-05-28 PROCEDURE — 3017F COLORECTAL CA SCREEN DOC REV: CPT | Performed by: FAMILY MEDICINE

## 2025-05-28 PROCEDURE — G8417 CALC BMI ABV UP PARAM F/U: HCPCS | Performed by: FAMILY MEDICINE

## 2025-05-28 PROCEDURE — G8427 DOCREV CUR MEDS BY ELIG CLIN: HCPCS | Performed by: FAMILY MEDICINE

## 2025-05-28 ASSESSMENT — ENCOUNTER SYMPTOMS
RESPIRATORY NEGATIVE: 1
GASTROINTESTINAL NEGATIVE: 1
EYES NEGATIVE: 1

## 2025-05-28 NOTE — PROGRESS NOTES
Subjective:      Patient ID: Sunita Rubio is a 65 y.o. female.    HPI  acute visit for finding a lump in the right breast during self check in the shower.    She has been up to date on her mammograms with no concerns.  Biopsy of the right breast in the past benign.   Mother had breast cancer around age 51.      Past Medical History:   Diagnosis Date    Anxiety     Coronary artery disease     with history of multiple chest pain episodes, MI ruled out, with stents placed in , , and  to the ramus artery, PTCA only to small diagonal.    Depression     with chronic dysthymia, prior intentional overdose on Ambien and Phenergan, history of agoraphobia symptoms.     Diabetes mellitus, type 2 (HCC)     adult onset.     Examination of participant in clinical trial 14    End date 14    Gastroesophageal reflux disease     Hyperlipidemia     Hypertension     Hypomagnesemia     Insomnia     Normal left ventricular systolic function     Obesity     Obstructive sleep apnea     NO MACHINE USED    Sciatic nerve disease      Past Surgical History:   Procedure Laterality Date    BREAST BIOPSY Right 09/15/2014    US guided -benign bx, mass in another location    CARDIAC CATHETERIZATION  2018    Patent distal LAD and proximal Ramus stents.    CARDIAC CATHETERIZATION  2020    CARDIAC CATHETERIZATION  2025     SECTION       SECTION  1985    CHOLECYSTECTOMY  1992    CORONARY ANGIOPLASTY  2009    [facility]    CORONARY ANGIOPLASTY  2013    LAD--stent    CORONARY ANGIOPLASTY WITH STENT PLACEMENT  2011    LAD with diffuse plaque disease, left circumflex with mild irregularities, RCA with diffuse plaque disease, status post drug eluting stent to the proximal ramus.     CORONARY ANGIOPLASTY WITH STENT PLACEMENT  2008    LAD with minor proximal disease, distal smooth 50 to 60% stenosis, ramus intermedius with proximal 30 to 40% stenosis followed by

## 2025-05-29 NOTE — TELEPHONE ENCOUNTER
Contacted patient and reviewed medication hold instructions. She also would like to reschedule her procedure for end of June. Rescheduled to 6/25/2025- notified surgery center.

## 2025-06-02 ENCOUNTER — HOSPITAL ENCOUNTER (OUTPATIENT)
Dept: CT IMAGING | Age: 65
Discharge: HOME | End: 2025-06-04
Attending: SURGERY
Payer: MEDICARE

## 2025-06-02 DIAGNOSIS — R11.0 NAUSEA: ICD-10-CM

## 2025-06-02 DIAGNOSIS — D64.9 ANEMIA, UNSPECIFIED TYPE: ICD-10-CM

## 2025-06-02 DIAGNOSIS — R19.7 DIARRHEA, UNSPECIFIED TYPE: ICD-10-CM

## 2025-06-02 DIAGNOSIS — R10.9 ABDOMINAL PAIN, UNSPECIFIED ABDOMINAL LOCATION: ICD-10-CM

## 2025-06-02 PROCEDURE — 2500000003 HC RX 250 WO HCPCS: Performed by: SURGERY

## 2025-06-02 PROCEDURE — 74176 CT ABD & PELVIS W/O CONTRAST: CPT

## 2025-06-02 RX ADMIN — BARIUM SULFATE 450 ML: 20 SUSPENSION ORAL at 15:19

## 2025-06-04 ENCOUNTER — HOSPITAL ENCOUNTER (OUTPATIENT)
Dept: MAMMOGRAPHY | Age: 65
Discharge: HOME | End: 2025-06-06
Attending: FAMILY MEDICINE
Payer: MEDICARE

## 2025-06-04 ENCOUNTER — HOSPITAL ENCOUNTER (OUTPATIENT)
Dept: ULTRASOUND IMAGING | Age: 65
Discharge: HOME | End: 2025-06-06
Attending: FAMILY MEDICINE
Payer: MEDICARE

## 2025-06-04 ENCOUNTER — RESULTS FOLLOW-UP (OUTPATIENT)
Dept: FAMILY MEDICINE CLINIC | Age: 65
End: 2025-06-04

## 2025-06-04 VITALS — WEIGHT: 175 LBS | HEIGHT: 61 IN | BODY MASS INDEX: 33.04 KG/M2

## 2025-06-04 DIAGNOSIS — N64.9 DISORDER OF BREAST, UNSPECIFIED: ICD-10-CM

## 2025-06-04 DIAGNOSIS — N63.0 SUBCUTANEOUS NODULE OF BREAST: ICD-10-CM

## 2025-06-04 DIAGNOSIS — N63.0 SUBCUTANEOUS NODULE OF BREAST: Primary | ICD-10-CM

## 2025-06-04 PROCEDURE — 76642 ULTRASOUND BREAST LIMITED: CPT

## 2025-06-04 PROCEDURE — G0279 TOMOSYNTHESIS, MAMMO: HCPCS

## 2025-06-18 ENCOUNTER — RESULTS FOLLOW-UP (OUTPATIENT)
Dept: SURGERY | Age: 65
End: 2025-06-18

## 2025-06-23 ENCOUNTER — TELEPHONE (OUTPATIENT)
Dept: SURGERY | Age: 65
End: 2025-06-23

## 2025-06-23 NOTE — TELEPHONE ENCOUNTER
Patient called and states that she needs to reschedule EGD and Colonoscopy on 6-25-25, that day does not work.  Rescheduled to 8-5-25. Also mailed out the bowel prep and pre op instructions to patient per her request.

## 2025-07-12 DIAGNOSIS — I10 PRIMARY HYPERTENSION: ICD-10-CM

## 2025-07-14 RX ORDER — LOSARTAN POTASSIUM 100 MG/1
100 TABLET ORAL DAILY
Qty: 90 TABLET | Refills: 1 | Status: SHIPPED | OUTPATIENT
Start: 2025-07-14

## 2025-08-05 ENCOUNTER — HOSPITAL ENCOUNTER (OUTPATIENT)
Age: 65
Setting detail: OUTPATIENT SURGERY
Discharge: HOME | End: 2025-08-05
Attending: SURGERY | Admitting: SURGERY
Payer: MEDICARE

## 2025-08-05 ENCOUNTER — ANESTHESIA EVENT (OUTPATIENT)
Dept: OPERATING ROOM | Age: 65
End: 2025-08-05
Payer: MEDICARE

## 2025-08-05 ENCOUNTER — ANESTHESIA (OUTPATIENT)
Dept: OPERATING ROOM | Age: 65
End: 2025-08-05
Payer: MEDICARE

## 2025-08-05 VITALS
HEART RATE: 65 BPM | TEMPERATURE: 97.8 F | DIASTOLIC BLOOD PRESSURE: 64 MMHG | BODY MASS INDEX: 32.85 KG/M2 | SYSTOLIC BLOOD PRESSURE: 162 MMHG | WEIGHT: 174 LBS | OXYGEN SATURATION: 97 % | RESPIRATION RATE: 16 BRPM | HEIGHT: 61 IN

## 2025-08-05 DIAGNOSIS — R19.7 DIARRHEA: ICD-10-CM

## 2025-08-05 DIAGNOSIS — D64.9 ANEMIA: ICD-10-CM

## 2025-08-05 DIAGNOSIS — R11.0 NAUSEA: ICD-10-CM

## 2025-08-05 DIAGNOSIS — R10.9 ABDOMINAL PAIN: ICD-10-CM

## 2025-08-05 DIAGNOSIS — K92.1 MELENA: ICD-10-CM

## 2025-08-05 LAB — GLUCOSE BLD-MCNC: 134 MG/DL (ref 65–105)

## 2025-08-05 PROCEDURE — 3609012400 HC EGD TRANSORAL BIOPSY SINGLE/MULTIPLE: Performed by: SURGERY

## 2025-08-05 PROCEDURE — 3700000001 HC ADD 15 MINUTES (ANESTHESIA): Performed by: SURGERY

## 2025-08-05 PROCEDURE — 2709999900 HC NON-CHARGEABLE SUPPLY: Performed by: SURGERY

## 2025-08-05 PROCEDURE — 3700000000 HC ANESTHESIA ATTENDED CARE: Performed by: SURGERY

## 2025-08-05 PROCEDURE — 88305 TISSUE EXAM BY PATHOLOGIST: CPT

## 2025-08-05 PROCEDURE — 43239 EGD BIOPSY SINGLE/MULTIPLE: CPT | Performed by: SURGERY

## 2025-08-05 PROCEDURE — 7100000011 HC PHASE II RECOVERY - ADDTL 15 MIN: Performed by: SURGERY

## 2025-08-05 PROCEDURE — 82947 ASSAY GLUCOSE BLOOD QUANT: CPT

## 2025-08-05 PROCEDURE — 7100000010 HC PHASE II RECOVERY - FIRST 15 MIN: Performed by: SURGERY

## 2025-08-05 PROCEDURE — 2580000003 HC RX 258: Performed by: NURSE ANESTHETIST, CERTIFIED REGISTERED

## 2025-08-05 PROCEDURE — 6360000002 HC RX W HCPCS: Performed by: NURSE ANESTHETIST, CERTIFIED REGISTERED

## 2025-08-05 PROCEDURE — C1713 ANCHOR/SCREW BN/BN,TIS/BN: HCPCS | Performed by: SURGERY

## 2025-08-05 PROCEDURE — 45385 COLONOSCOPY W/LESION REMOVAL: CPT | Performed by: SURGERY

## 2025-08-05 PROCEDURE — 3609010600 HC COLONOSCOPY POLYPECTOMY SNARE/COLD BIOPSY: Performed by: SURGERY

## 2025-08-05 PROCEDURE — 2580000003 HC RX 258: Performed by: SURGERY

## 2025-08-05 PROCEDURE — 45384 COLONOSCOPY W/LESION REMOVAL: CPT | Performed by: SURGERY

## 2025-08-05 PROCEDURE — 45381 COLONOSCOPY SUBMUCOUS NJX: CPT | Performed by: SURGERY

## 2025-08-05 RX ORDER — PROPOFOL 10 MG/ML
INJECTION, EMULSION INTRAVENOUS
Status: DISCONTINUED | OUTPATIENT
Start: 2025-08-05 | End: 2025-08-05 | Stop reason: SDUPTHER

## 2025-08-05 RX ORDER — SODIUM CHLORIDE 0.9 % (FLUSH) 0.9 %
5-40 SYRINGE (ML) INJECTION PRN
Status: DISCONTINUED | OUTPATIENT
Start: 2025-08-05 | End: 2025-08-05 | Stop reason: HOSPADM

## 2025-08-05 RX ORDER — SODIUM CHLORIDE 9 MG/ML
25 INJECTION, SOLUTION INTRAVENOUS PRN
Status: DISCONTINUED | OUTPATIENT
Start: 2025-08-05 | End: 2025-08-05 | Stop reason: HOSPADM

## 2025-08-05 RX ORDER — SODIUM CHLORIDE, SODIUM LACTATE, POTASSIUM CHLORIDE, CALCIUM CHLORIDE 600; 310; 30; 20 MG/100ML; MG/100ML; MG/100ML; MG/100ML
INJECTION, SOLUTION INTRAVENOUS CONTINUOUS
Status: DISCONTINUED | OUTPATIENT
Start: 2025-08-05 | End: 2025-08-05 | Stop reason: HOSPADM

## 2025-08-05 RX ORDER — SODIUM CHLORIDE, SODIUM LACTATE, POTASSIUM CHLORIDE, CALCIUM CHLORIDE 600; 310; 30; 20 MG/100ML; MG/100ML; MG/100ML; MG/100ML
INJECTION, SOLUTION INTRAVENOUS
Status: DISCONTINUED | OUTPATIENT
Start: 2025-08-05 | End: 2025-08-05 | Stop reason: SDUPTHER

## 2025-08-05 RX ORDER — SODIUM CHLORIDE 0.9 % (FLUSH) 0.9 %
5-40 SYRINGE (ML) INJECTION EVERY 12 HOURS SCHEDULED
Status: DISCONTINUED | OUTPATIENT
Start: 2025-08-05 | End: 2025-08-05 | Stop reason: HOSPADM

## 2025-08-05 RX ADMIN — PROPOFOL 200 MG: 10 INJECTION, EMULSION INTRAVENOUS at 11:12

## 2025-08-05 RX ADMIN — PROPOFOL 180 MCG/KG/MIN: 10 INJECTION, EMULSION INTRAVENOUS at 11:13

## 2025-08-05 RX ADMIN — SODIUM CHLORIDE, POTASSIUM CHLORIDE, SODIUM LACTATE AND CALCIUM CHLORIDE: 600; 310; 30; 20 INJECTION, SOLUTION INTRAVENOUS at 09:49

## 2025-08-05 RX ADMIN — SODIUM CHLORIDE, POTASSIUM CHLORIDE, SODIUM LACTATE AND CALCIUM CHLORIDE: 600; 310; 30; 20 INJECTION, SOLUTION INTRAVENOUS at 11:03

## 2025-08-05 ASSESSMENT — PAIN DESCRIPTION - PAIN TYPE
TYPE: SURGICAL PAIN

## 2025-08-05 ASSESSMENT — PAIN SCALES - GENERAL
PAINLEVEL_OUTOF10: 2
PAINLEVEL_OUTOF10: 3
PAINLEVEL_OUTOF10: 4
PAINLEVEL_OUTOF10: 2
PAINLEVEL_OUTOF10: 4

## 2025-08-05 ASSESSMENT — PAIN - FUNCTIONAL ASSESSMENT: PAIN_FUNCTIONAL_ASSESSMENT: 0-10

## 2025-08-05 ASSESSMENT — PAIN DESCRIPTION - LOCATION
LOCATION: ABDOMEN

## 2025-08-05 ASSESSMENT — PAIN DESCRIPTION - DESCRIPTORS
DESCRIPTORS: CRAMPING
DESCRIPTORS: CRUSHING
DESCRIPTORS: PRESSURE

## 2025-08-08 LAB — SURGICAL PATHOLOGY REPORT: NORMAL

## 2025-08-11 RX ORDER — METOPROLOL TARTRATE 100 MG/1
100 TABLET ORAL 2 TIMES DAILY
Qty: 180 TABLET | Refills: 1 | Status: SHIPPED | OUTPATIENT
Start: 2025-08-11

## 2025-08-11 RX ORDER — ESOMEPRAZOLE MAGNESIUM 40 MG/1
40 CAPSULE, DELAYED RELEASE ORAL DAILY
Qty: 90 CAPSULE | Refills: 0 | Status: SHIPPED | OUTPATIENT
Start: 2025-08-11

## 2025-08-20 ENCOUNTER — TELEPHONE (OUTPATIENT)
Dept: SURGERY | Age: 65
End: 2025-08-20

## (undated) DEVICE — Device

## (undated) DEVICE — NEEDLE ENDOSCP 25GA L4MM WRK L230CM MIN CHN 28MM UP GI MID

## (undated) DEVICE — MARKER ENDOSCP TISS TATTOO C BLK SUSP PREFIL PREASSEMBLED

## (undated) DEVICE — CANNULA NSL SUPERSOFT 7 FT CO2 SAMPLIG O2 TBNG

## (undated) DEVICE — FORCEPS BX L240CM JAW DIA2.2MM RAD JAW 4 HOT DISP

## (undated) DEVICE — FORCEPS BX L240CM JAW DIA2.4MM ORNG L CAP W/ NDL DISP RAD

## (undated) DEVICE — TRAP SURG QUAD PARABOLA SLOT DSGN SFTY SCRN TRAPEASE

## (undated) DEVICE — PAD ES 15SQIN UNIV SPLNT PREATTACH CRD GRND W/ SFTY RNG

## (undated) DEVICE — SNARE ENDOSCP L240CM SHTH DIA2.4MM LOOP W20MM MIN WRK CHN

## (undated) DEVICE — BITE BLOCK W/VELCRO STRAP

## (undated) DEVICE — MANIFOLD SUCT 4 PRT 2 CANSTR FLTR DISP NEPTUNE 2